# Patient Record
Sex: FEMALE | Race: WHITE | Employment: OTHER | URBAN - METROPOLITAN AREA
[De-identification: names, ages, dates, MRNs, and addresses within clinical notes are randomized per-mention and may not be internally consistent; named-entity substitution may affect disease eponyms.]

---

## 2018-03-12 ENCOUNTER — HOSPITAL ENCOUNTER (OUTPATIENT)
Dept: PHYSICAL THERAPY | Age: 78
Discharge: HOME | End: 2018-03-12
Payer: MEDICARE

## 2018-03-12 PROCEDURE — G8978 MOBILITY CURRENT STATUS: HCPCS

## 2018-03-12 PROCEDURE — G8979 MOBILITY GOAL STATUS: HCPCS

## 2018-03-12 PROCEDURE — 97161 PT EVAL LOW COMPLEX 20 MIN: CPT

## 2018-03-12 PROCEDURE — 97110 THERAPEUTIC EXERCISES: CPT

## 2018-03-12 NOTE — PROGRESS NOTES
Ambulatory/Rehab Services H2 Model Falls Risk Assessment    Risk Factor Pts. ·   Confusion/Disorientation/Impulsivity  []    4 ·   Symptomatic Depression  []   2 ·   Altered Elimination  []   1 ·   Dizziness/Vertigo  []   1 ·   Gender (Male)  []   1 ·   Any administered antiepileptics (anticonvulsants):  []   2 ·   Any administered benzodiazepines:  []   1 ·   Visual Impairment (specify):  []   1 ·   Portable Oxygen Use  []   1 ·   Orthostatic ? BP  []   1 ·   History of Recent Falls (within 3 mos.)  [x]   5     Ability to Rise from Chair (choose one) Pts. ·   Ability to rise in a single movement  [x]   0 ·   Pushes up, successful in one attempt  []   1 ·   Multiple attempts, but successful  []   3 ·   Unable to rise without assistance  []   4   Total: (5 or greater = High Risk) 5     Falls Prevention Plan:   [x]                Physical Limitations to Exercise (specify):constant supervision   []                Mobility Assistance Device (type):   []                Exercise/Equipment Adaptation (specify):    ©2010 [facility] [employer] [address] [place] [address] Patent #[id number].  Federal Law prohibits the replication, distribution or use without written permission from [facility] [employer]

## 2018-03-12 NOTE — THERAPY EVALUATION
Mele Nan  : 1940  [place]  Secondary: [employer] [address] [facility]  at [facility]  [facility], [address], [place] [address]  [phone]   [phone]        OUTPATIENT PHYSICAL THERAPY:Initial Assessment 3/12/2018    ICD-10: Treatment Diagnosis:   Pain in left knee (M25.562)   Presence of left artificial knee joint (F70.677)   Precautions/Allergies:   Sulfa (sulfonamide antibiotics) and Tape [adhesive]   Fall Risk Score: 5 (? 5 = High Risk)  MD Orders: Eval and Treat MEDICAL/REFERRING DIAGNOSIS:  Low back pain [M54.5]  Other specified enthesopathies of left lower limb, excluding foot [M76.892]  Presence of left artificial knee joint [Z96.652]   DATE OF ONSET: 2015  REFERRING PHYSICIAN: Wayne Aguila MD  RETURN PHYSICIAN APPOINTMENT: not scheduled     INITIAL ASSESSMENT:  Ms. Joe Bermudez presents with complaints of pain in left knee mainly with stair climbing and sit to stand. Pt had left TKA in 2015. She progressed quickly with minimal PT. She reports tripping in her yard in January, but admits that current pain was about the same before recent fall. X-ray of her knee indicated no significant changes. Objective measures indicate good ROM with slight strength decrease for left knee. Pt may benefit from PT to address the following problem list.   PROBLEM LIST (Impacting functional limitations):  1. Decreased Strength  2. Decreased ADL/Functional Activities  3. Increased Pain INTERVENTIONS PLANNED:  1. Gait Training  2. Home Exercise Program (HEP)  3. Manual Therapy  4. Therapeutic Exercise/Strengthening   TREATMENT PLAN:  Effective Dates: 3-12-18 TO 3-23-18. Frequency/Duration: 2 times a week for 6 weeks  GOALS: (Goals have been discussed and agreed upon with patient.)  Short-Term Functional Goals: Time Frame: 4 weeks  1. Independent in initial HEP  2.  Decrease pain to < 3/10 with stair ascent and descent  Discharge Goals: Time Frame: 6 weeks  1. Independent in advanced HEP  2. 5/5 strength left quads  3. Minimal pain with stair climbing  Rehabilitation Potential For Stated Goals: Good  Regarding Bernadene Daily therapy, I certify that the treatment plan above will be carried out by a therapist or under their direction. Thank you for this referral,  Lanre Posadas PT                 The information in this section was collected on 3-12-18 (except where otherwise noted). HISTORY:   History of Present Injury/Illness (Reason for Referral):  Pt had left TKA 2015. She completed short round of PT with minimal pain and was discharged to [facility]. Pt reports pain continued mainly with stairs and sit to stand. She reports tripping in her yard in 2018. She was concerned about damage to TKA. X-rays were negative. Pt admits pain is now about the same as before the fall. Past Medical History/Comorbidities:   Ms. Radha Rosales  has a past medical history of Atrial septal aneurysm; CAD (coronary artery disease); Dyslipidemia; History of breast cancer (); Hypertension; Osteoarthritis; Paroxysmal atrial fibrillation ([address] Patent foramen ovale; and Valvular heart disease. Ms. Radha Rosales  has a past surgical history that includes hx mastectomy (Left, ); hx hysterectomy (); hx  section; hx cholecystectomy (); hx mohs procedure (Left, ); hx cataract removal (Bilateral); and hx back surgery. Left TKA 2015.   Social History/Living Environment:     lives alone in single story home with walk in shower  Prior Level of Function/Work/Activity:  independent  Dominant Side:         LEFT  Current Medications:       Current Outpatient Prescriptions:     calcium-cholecalciferol, d3, 600-125 mg-unit tab, Take  by mouth., Disp: , Rfl:     fenofibrate (TRICOR) 160 mg tablet, Take 160 mg by mouth nightly., Disp: , Rfl:     rosuvastatin (CRESTOR) 10 mg tablet, Take 10 mg by mouth nightly., Disp: , Rfl:     benazepril (LOTENSIN) 20 mg tablet, Take 20 mg by mouth daily. Indications: HYPERTENSION, Disp: , Rfl:     amiodarone (CORDARONE) 200 mg tablet, Take 200 mg by mouth daily. Instructed to take DOS per Anesthesia guidelines. Indications: VENTRICULAR RATE CONTROL IN ATRIAL FIBRILLATION, Disp: , Rfl:     aspirin (ASPIRIN) 325 mg tablet, Take 325 mg by mouth daily. Decrease to Aspirin 81mg 5 days prior to surgery per anesthesia guidelines. , Disp: , Rfl:    Date Last Reviewed:  3/12/2018   Number of Personal Factors/Comorbidities that affect the Plan of Care: 0: LOW COMPLEXITY   EXAMINATION:   Observation/Orthostatic Postural Assessment:          Minimal edema left knee - surgical scar well healed with no adhesins noted  Palpation:          Mild tenderness at insertion of left IT band  ROM:          Left knee full extension and 128 flexion  Strength:          Flexion - 5/5  Extension 4+/5 left knee   Body Structures Involved:  1. Joints  2. Muscles Body Functions Affected:  1. Movement Related Activities and Participation Affected:  1. General Tasks and Demands   Number of elements (examined above) that affect the Plan of Care: 1-2: LOW COMPLEXITY   CLINICAL PRESENTATION:   Presentation: Stable and uncomplicated: LOW COMPLEXITY   CLINICAL DECISION MAKING:   Outcome Measure: Tool Used: Lower Extremity Functional Scale (LEFS)  Score:  Initial: 42/80  (Date: 3-12-18 ) Most Recent: X/80 (Date: -- )   Interpretation of Score: 20 questions each scored on a 5 point scale with 0 representing \"extreme difficulty or unable to perform\" and 4 representing \"no difficulty\". The lower the score, the greater the functional disability. 80/80 represents no disability. Minimal detectable change is 9 points. Score 80 79-63 62-48 47-32 31-16 15-1 0   Modifier CH CI CJ CK CL CM CN     ?  Mobility - Walking and Moving Around:     - CURRENT STATUS: CK - 40%-59% impaired, limited or restricted    - GOAL STATUS: CI - 1%-19% impaired, limited or restricted    - D/C STATUS:  ---------------To be determined---------------    Medical Necessity:   · Patient demonstrates good rehab potential due to higher previous functional level. Reason for Services/Other Comments:  · Patient continues to require modification of therapeutic interventions to increase complexity of exercises. Use of outcome tool(s) and clinical judgement create a POC that gives a: Clear prediction of patient's progress: LOW COMPLEXITY            TREATMENT:   (In addition to Assessment/Re-Assessment sessions the following treatments were rendered)  3/12/2018  Pre-treatment Symptoms/Complaints:  Pain mainly with stair ascent and descent and with sit to stand. Pain: Initial:   Pain Intensity 1: 0 (0 current   7 at worst)  Pain Location 1: Knee  Pain Orientation 1: Left  Post Session:  0/10     THERAPEUTIC EXERCISE: (15 minutes):  Exercises per grid below to improve mobility, strength and coordination. Required minimal verbal cues to promote proper body alignment and promote proper body mechanics. Progressed resistance, range, repetitions and complexity of movement as indicated. Access Code: [email or web address]   URL: [email or web address]   Date: 03/12/2018   Prepared by: Donn Low     Exercises  Supine Quad Set - 10 reps - 5 hold - 2x daily  Supine Heel Slide - 10 reps - 2x daily  Small Range Straight Leg Raise - 10 reps - 2x daily  Supine Knee Extension Strengthening - 10 reps - 2x daily     Date:  3-12-18 Date:   Date:     Activity/Exercise Parameters Parameters Parameters   HEP As above                                             Treatment/Session Assessment:    · Response to Treatment:  Pt demonstrates good understanding of initial HEP. · Compliance with Program/Exercises: Will assess as treatment progresses. · Recommendations/Intent for next treatment session: \"Next visit will focus on advancements to more challenging activities\".     Future Appointments  Date Time Provider Sonia Martell   3/15/2018 9:00 AM Lew Briseno, PT [facility]   3/19/2018 9:15 AM Lew Briseno, PT [facility]   3/21/2018 9:15 AM Lew Briseno, PT [facility]   3/26/2018 9:15 AM Lew Briseno, PT [facility]   3/28/2018 9:15 AM Lew Briseno, PT [facility]   4/3/2018 9:15 AM Janet Gracia, DPT [facility]   4/4/2018 9:15 AM Lew Briseno, PT [facility]   4/9/2018 9:15 AM Lew Briseno, PT [facility]   4/11/2018 9:15 AM Lew Briseno, PT [facility]   4/16/2018 9:15 AM Lew Briseno, PT [facility]   4/18/2018 9:15 AM Lew Briseno, PT [facility]     Please explain any variance from Plan of Care.   Total Treatment Duration:  PT Patient Time In/Time Out  Time In: 1500  Time Out: 1600    Lew Briseno, PT

## 2018-03-15 ENCOUNTER — HOSPITAL ENCOUNTER (OUTPATIENT)
Dept: PHYSICAL THERAPY | Age: 78
Discharge: HOME | End: 2018-03-15
Payer: MEDICARE

## 2018-03-15 PROCEDURE — 97110 THERAPEUTIC EXERCISES: CPT

## 2018-03-15 NOTE — PROGRESS NOTES
Courtney Li  : 1940  [place]  Secondary: [employer] Generic [address] [facility]  at [address] , [address], [place]  [phone]   [phone]        OUTPATIENT PHYSICAL THERAPY:Daily Note 3/15/2018    ICD-10: Treatment Diagnosis:   Pain in left knee (M25.562)   Presence of left artificial knee joint (U90.626)   Precautions/Allergies:   Sulfa (sulfonamide antibiotics) and Tape [adhesive]   Fall Risk Score: 5 (? 5 = High Risk)  MD Orders: Eval and Treat MEDICAL/REFERRING DIAGNOSIS:  Low back pain [M54.5]  Other specified enthesopathies of left lower limb, excluding foot [M76.892]  Presence of left artificial knee joint [Z96.652]   DATE OF ONSET: 2015  REFERRING PHYSICIAN: Macie Rayo MD  RETURN PHYSICIAN APPOINTMENT: not scheduled     INITIAL ASSESSMENT:  Ms. Alexander Ra presents with complaints of pain in left knee mainly with stair climbing and sit to stand. Pt had left TKA in 2015. She progressed quickly with minimal PT. She reports tripping in her yard in January, but admits that current pain was about the same before recent fall. X-ray of her knee indicated no significant changes. Objective measures indicate good ROM with slight strength decrease for left knee. Pt may benefit from PT to address the following problem list.   PROBLEM LIST (Impacting functional limitations):  1. Decreased Strength  2. Decreased ADL/Functional Activities  3. Increased Pain INTERVENTIONS PLANNED:  1. Gait Training  2. Home Exercise Program (HEP)  3. Manual Therapy  4. Therapeutic Exercise/Strengthening   TREATMENT PLAN:  Effective Dates: 3-12-18 TO 3-23-18. Frequency/Duration: 2 times a week for 6 weeks  GOALS: (Goals have been discussed and agreed upon with patient.)  Short-Term Functional Goals: Time Frame: 4 weeks  1. Independent in initial HEP  2.  Decrease pain to < 3/10 with stair ascent and descent  Discharge Goals: Time Frame: 6 weeks  1. Independent in advanced HEP  2. 5/5 strength left quads  3. Minimal pain with stair climbing  Rehabilitation Potential For Stated Goals: Good  Regarding Lexi Cotto therapy, I certify that the treatment plan above will be carried out by a therapist or under their direction. Thank you for this referral,  Clement Corado PT                 The information in this section was collected on 3-12-18 (except where otherwise noted). HISTORY:   History of Present Injury/Illness (Reason for Referral):  Pt had left TKA 2015. She completed short round of PT with minimal pain and was discharged to [facility]. Pt reports pain continued mainly with stairs and sit to stand. She reports tripping in her yard in 2018. She was concerned about damage to TKA. X-rays were negative. Pt admits pain is now about the same as before the fall. Past Medical History/Comorbidities:   Ms. Jake Lozano  has a past medical history of Atrial septal aneurysm; CAD (coronary artery disease); Dyslipidemia; History of breast cancer (); Hypertension; Osteoarthritis; Paroxysmal atrial fibrillation ([facility] [address] Patent foramen ovale; and Valvular heart disease. Ms. Jake Lozano  has a past surgical history that includes hx mastectomy (Left, ); hx hysterectomy (); hx  section; hx cholecystectomy (); hx mohs procedure (Left, ); hx cataract removal (Bilateral); and hx back surgery. Left TKA 2015.   Social History/Living Environment:     lives alone in single story home with walk in shower  Prior Level of Function/Work/Activity:  independent  Dominant Side:         LEFT  Current Medications:       Current Outpatient Prescriptions:     calcium-cholecalciferol, d3, 600-125 mg-unit tab, Take  by mouth., Disp: , Rfl:     fenofibrate (TRICOR) 160 mg tablet, Take 160 mg by mouth nightly., Disp: , Rfl:     rosuvastatin (CRESTOR) 10 mg tablet, Take 10 mg by mouth nightly., Disp: , Rfl:     benazepril (LOTENSIN) 20 mg tablet, Take 20 mg by mouth daily. Indications: HYPERTENSION, Disp: , Rfl:     amiodarone (CORDARONE) 200 mg tablet, Take 200 mg by mouth daily. Instructed to take DOS per Anesthesia guidelines. Indications: VENTRICULAR RATE CONTROL IN ATRIAL FIBRILLATION, Disp: , Rfl:     aspirin (ASPIRIN) 325 mg tablet, Take 325 mg by mouth daily. Decrease to Aspirin 81mg 5 days prior to surgery per anesthesia guidelines. , Disp: , Rfl:    Date Last Reviewed:  3/15/2018   EXAMINATION:   Observation/Orthostatic Postural Assessment:          Minimal edema left knee - surgical scar well healed with no adhesins noted  Palpation:          Mild tenderness at insertion of left IT band  ROM:          Left knee full extension and 128 flexion  Strength:          Flexion - 5/5  Extension 4+/5 left knee   CLINICAL PRESENTATION:   CLINICAL DECISION MAKING:   Outcome Measure: Tool Used: Lower Extremity Functional Scale (LEFS)  Score:  Initial: 42/80  (Date: 3-12-18 ) Most Recent: X/80 (Date: -- )   Interpretation of Score: 20 questions each scored on a 5 point scale with 0 representing \"extreme difficulty or unable to perform\" and 4 representing \"no difficulty\". The lower the score, the greater the functional disability. 80/80 represents no disability. Minimal detectable change is 9 points. Score 80 79-63 62-48 47-32 31-16 15-1 0   Modifier CH CI CJ CK CL CM CN     ? Mobility - Walking and Moving Around:     - CURRENT STATUS: CK - 40%-59% impaired, limited or restricted    - GOAL STATUS: CI - 1%-19% impaired, limited or restricted    - D/C STATUS:  ---------------To be determined---------------    Medical Necessity:   · Patient demonstrates good rehab potential due to higher previous functional level. Reason for Services/Other Comments:  · Patient continues to require modification of therapeutic interventions to increase complexity of exercises.             TREATMENT:   (In addition to Assessment/Re-Assessment sessions the following treatments were rendered)  3/15/2018  Pre-treatment Symptoms/Complaints:  Pt states that her good knee is a little sore with start of HEP. We had recommended doing HEP bilaterally. Pain: Initial:   Pain Intensity 1: 2  Pain Location 1: Knee  Post Session:  2/10     THERAPEUTIC EXERCISE: (45 minutes):  Exercises per grid below to improve mobility, strength and coordination. Required minimal verbal cues to promote proper body alignment and promote proper body mechanics. Progressed resistance, range, repetitions and complexity of movement as indicated. Access Code: [email or web address]   URL: [email or web address]   Date: 03/12/2018   Prepared by: Donn Low     Exercises  Supine Quad Set - 10 reps - 5 hold - 2x daily  Supine Heel Slide - 10 reps - 2x daily  Small Range Straight Leg Raise - 10 reps - 2x daily  Supine Knee Extension Strengthening - 10 reps - 2x daily     Date:  3-12-18 Date:  3-15-18 Date:     Activity/Exercise Parameters Parameters Parameters   HEP As above     Recumbent stepper  Level 1 x 10'    Ant step ups  X 10 BLE    Lat step ups  X 10 BLE    Ant heel taps  X 10 BLE    Incline calf  10 x 5\"    Wall squats  X 10    QS  X 20    SLR  2# x 10    SAQ  2# x 10    Standing hamstring curl  2# x 10    Standing hip abd  2# x 10    Standing hip flx  2# x 10        Treatment/Session Assessment:    · Response to Treatment:  Pt moderately fatigued at end of session. · Compliance with Program/Exercises: Will assess as treatment progresses. · Recommendations/Intent for next treatment session: \"Next visit will focus on advancements to more challenging activities\".     Future Appointments  Date Time Provider Sonia Martell   3/19/2018 9:15 AM Moni Rodriguez PT [facility]   3/21/2018 9:15 AM Moni Rodriguez PT [facility]   3/26/2018 9:15 AM Moni Rodriguez PT [facility]   3/28/2018 9:15 AM Moni Rodriguez PT [facility]   4/3/2018 1:00 PM Lalita Pemberton DPT [facility]   4/4/2018 9:15 AM Alexandra Plaza, PT [facility]   4/9/2018 9:15 AM Alexandra Plaza, PT [facility]   4/11/2018 9:15 AM Alexandra Plaza, PT [facility]   4/16/2018 9:15 AM Alexandra Plaza, PT [facility]   4/18/2018 9:15 AM Alexandra Plaza, PT [facility]     Please explain any variance from Plan of Care.   Total Treatment Duration:  PT Patient Time In/Time Out  Time In: 0900  Time Out: 0945    Alexandra Plaza PT

## 2018-03-19 ENCOUNTER — HOSPITAL ENCOUNTER (OUTPATIENT)
Dept: PHYSICAL THERAPY | Age: 78
Discharge: HOME | End: 2018-03-19
Payer: MEDICARE

## 2018-03-19 PROCEDURE — 97110 THERAPEUTIC EXERCISES: CPT

## 2018-03-19 NOTE — PROGRESS NOTES
Carlotta Correia  : 1940  [place]  Secondary: [employer] Generic [address] [facility]  at [facility]  [address] , [address], [place] [address]  [phone]   [phone]        OUTPATIENT PHYSICAL THERAPY:Daily Note 3/19/2018    ICD-10: Treatment Diagnosis:   Pain in left knee (M25.562)   Presence of left artificial knee joint (Z38.578)   Precautions/Allergies:   Sulfa (sulfonamide antibiotics) and Tape [adhesive]   Fall Risk Score: 5 (? 5 = High Risk)  MD Orders: Eval and Treat MEDICAL/REFERRING DIAGNOSIS:  Low back pain [M54.5]  Other specified enthesopathies of left lower limb, excluding foot [M76.892]  Presence of left artificial knee joint [Z96.652]   DATE OF ONSET: 2015  REFERRING PHYSICIAN: Franklin Conteh MD  RETURN PHYSICIAN APPOINTMENT: not scheduled     INITIAL ASSESSMENT:  Ms. Arvin Luong presents with complaints of pain in left knee mainly with stair climbing and sit to stand. Pt had left TKA in 2015. She progressed quickly with minimal PT. She reports tripping in her yard in January, but admits that current pain was about the same before recent fall. X-ray of her knee indicated no significant changes. Objective measures indicate good ROM with slight strength decrease for left knee. Pt may benefit from PT to address the following problem list.   PROBLEM LIST (Impacting functional limitations):  1. Decreased Strength  2. Decreased ADL/Functional Activities  3. Increased Pain INTERVENTIONS PLANNED:  1. Gait Training  2. Home Exercise Program (HEP)  3. Manual Therapy  4. Therapeutic Exercise/Strengthening   TREATMENT PLAN:  Effective Dates: 3-12-18 TO 18. Frequency/Duration: 2 times a week for 6 weeks  GOALS: (Goals have been discussed and agreed upon with patient.)  Short-Term Functional Goals: Time Frame: 4 weeks  1. Independent in initial HEP  2.  Decrease pain to < 3/10 with stair ascent and descent  Discharge Goals: Time Frame: 6 weeks  1. Independent in advanced HEP  2. 5/5 strength left quads  3. Minimal pain with stair climbing  Rehabilitation Potential For Stated Goals: Good  Regarding [facility] therapy, I certify that the treatment plan above will be carried out by a therapist or under their direction. Thank you for this referral,  Tj Wright, PT                 The information in this section was collected on 3-12-18 (except where otherwise noted). HISTORY:   History of Present Injury/Illness (Reason for Referral):  Pt had left TKA 2015. She completed short round of PT with minimal pain and was discharged to [facility]. Pt reports pain continued mainly with stairs and sit to stand. She reports tripping in her yard in 2018. She was concerned about damage to TKA. X-rays were negative. Pt admits pain is now about the same as before the fall. Past Medical History/Comorbidities:   Ms. [facility]  has a past medical history of Atrial septal aneurysm; CAD (coronary artery disease); Dyslipidemia; History of breast cancer (); Hypertension; Osteoarthritis; Paroxysmal atrial fibrillation ([address] Patent foramen ovale; and Valvular heart disease. Ms. [facility]  has a past surgical history that includes hx mastectomy (Left, ); hx hysterectomy (); hx  section; hx cholecystectomy (); hx mohs procedure (Left, ); hx cataract removal (Bilateral); and hx back surgery. Left TKA 2015.   Social History/Living Environment:     lives alone in single story home with walk in shower  Prior Level of Function/Work/Activity:  independent  Dominant Side:         LEFT  Current Medications:       Current Outpatient Prescriptions:     calcium-cholecalciferol, d3, 600-125 mg-unit tab, Take  by mouth., Disp: , Rfl:     fenofibrate (TRICOR) 160 mg tablet, Take 160 mg by mouth nightly., Disp: , Rfl:     rosuvastatin (CRESTOR) 10 mg tablet, Take 10 mg by mouth nightly., Disp: , Rfl:     benazepril (LOTENSIN) 20 mg tablet, Take 20 mg by mouth daily. Indications: HYPERTENSION, Disp: , Rfl:     amiodarone (CORDARONE) 200 mg tablet, Take 200 mg by mouth daily. Instructed to take DOS per Anesthesia guidelines. Indications: VENTRICULAR RATE CONTROL IN ATRIAL FIBRILLATION, Disp: , Rfl:     aspirin (ASPIRIN) 325 mg tablet, Take 325 mg by mouth daily. Decrease to Aspirin 81mg 5 days prior to surgery per anesthesia guidelines. , Disp: , Rfl:    Date Last Reviewed:  3/19/2018   EXAMINATION:   Observation/Orthostatic Postural Assessment:          Minimal edema left knee - surgical scar well healed with no adhesins noted  Palpation:          Mild tenderness at insertion of left IT band  ROM:          Left knee full extension and 128 flexion  Strength:          Flexion - 5/5  Extension 4+/5 left knee   CLINICAL PRESENTATION:   CLINICAL DECISION MAKING:   Outcome Measure: Tool Used: Lower Extremity Functional Scale (LEFS)  Score:  Initial: 42/80  (Date: 3-12-18 ) Most Recent: X/80 (Date: -- )   Interpretation of Score: 20 questions each scored on a 5 point scale with 0 representing \"extreme difficulty or unable to perform\" and 4 representing \"no difficulty\". The lower the score, the greater the functional disability. 80/80 represents no disability. Minimal detectable change is 9 points. Score 80 79-63 62-48 47-32 31-16 15-1 0   Modifier CH CI CJ CK CL CM CN     ? Mobility - Walking and Moving Around:     - CURRENT STATUS: CK - 40%-59% impaired, limited or restricted    - GOAL STATUS: CI - 1%-19% impaired, limited or restricted    - D/C STATUS:  ---------------To be determined---------------    Medical Necessity:   · Patient demonstrates good rehab potential due to higher previous functional level. Reason for Services/Other Comments:  · Patient continues to require modification of therapeutic interventions to increase complexity of exercises.             TREATMENT:   (In addition to Assessment/Re-Assessment sessions the following treatments were rendered)  3/19/2018  Pre-treatment Symptoms/Complaints:  Pt reports mild soreness both knees over the week-end. Pain: Initial:   Pain Intensity 1: 2  Pain Location 1: Knee  Post Session: 0/10     THERAPEUTIC EXERCISE: (45 minutes):  Exercises per grid below to improve mobility, strength and coordination. Required minimal verbal cues to promote proper body alignment and promote proper body mechanics. Progressed resistance, range, repetitions and complexity of movement as indicated. Access Code: [email or web address]   URL: [email or web address]   Date: 03/12/2018   Prepared by: Donn Low     Exercises  Supine Quad Set - 10 reps - 5 hold - 2x daily  Supine Heel Slide - 10 reps - 2x daily  Small Range Straight Leg Raise - 10 reps - 2x daily  Supine Knee Extension Strengthening - 10 reps - 2x daily     Date:  3-12-18 Date:  3-15-18 Date:  3-19-18   Activity/Exercise Parameters Parameters Parameters   HEP As above     Recumbent stepper  Level 1 x 10' Level 1 x 10'   Ant step ups  X 10 BLE X 10 BLE   Lat step ups  X 10 BLE X 10 BLE   Ant heel taps  X 10 BLE X 10 BLE   Incline calf  10 x 5\" 10 x 5\"   Wall squats  X 10 X 10   QS  X 20 X 20   SLR  2# x 10 2# x 10   SAQ  2# x 10 2# x 10   Standing hamstring curl  2# x 10 2# x 10   Standing hip abd  2# x 10 2# x 10   Standing hip flx  2# x 10 2# x 10       Treatment/Session Assessment:    · Response to Treatment:  Pt reports both knees feel better at end of session. Demonstrated improved form to day with steps, incline and SLR. · Compliance with Program/Exercises: Will assess as treatment progresses. · Recommendations/Intent for next treatment session: \"Next visit will focus on advancements to more challenging activities\".     Future Appointments  Date Time Provider Sonia Martell   [facility]2018 [facility]   3/26/2018 9:15 AM Marlene Moreira PT [facility]   3/28/2018 9:15 AM Richard Jaramillo, PT [facility]   4/3/2018 1:00 PM Marquis Baumann DPT [facility]   4/4/2018 9:15 AM Richard Jaramillo, PT [facility]   4/9/2018 9:15 AM Richard Jaramillo, PT [facility]   4/11/2018 9:15 AM Richard Jaramillo, PT [facility]   4/16/2018 9:15 AM Richard Jaramillo, PT [facility]   4/18/2018 9:15 AM Richard Jaramillo, PT [facility]     Please explain any variance from Plan of Care.   Total Treatment Duration:  PT Patient Time In/Time Out  Time In: 0915  Time Out: [address] [facility]

## 2018-03-21 ENCOUNTER — HOSPITAL ENCOUNTER (OUTPATIENT)
Dept: PHYSICAL THERAPY | Age: 78
Discharge: HOME | End: 2018-03-21
Payer: MEDICARE

## 2018-03-21 PROCEDURE — 97110 THERAPEUTIC EXERCISES: CPT

## 2018-03-21 NOTE — PROGRESS NOTES
Coleman Mckay  : 1940  [place]  Secondary: [employer] Generic [address] [facility]  at [address]  [facility] [place], [address], [place]  [phone]   [phone]        OUTPATIENT PHYSICAL THERAPY:Daily Note 3/21/2018    ICD-10: Treatment Diagnosis:   Pain in left knee (M25.562)   Presence of left artificial knee joint (J83.372)   Precautions/Allergies:   Sulfa (sulfonamide antibiotics) and Tape [adhesive]   Fall Risk Score: 5 (? 5 = High Risk)  MD Orders: Eval and Treat MEDICAL/REFERRING DIAGNOSIS:  Low back pain [M54.5]  Other specified enthesopathies of left lower limb, excluding foot [M76.892]  Presence of left artificial knee joint [Z96.652]   DATE OF ONSET: 2015  REFERRING PHYSICIAN: Alicia Garzon MD  RETURN PHYSICIAN APPOINTMENT: not scheduled     INITIAL ASSESSMENT:  Ms. Main White presents with complaints of pain in left knee mainly with stair climbing and sit to stand. Pt had left TKA in 2015. She progressed quickly with minimal PT. She reports tripping in her yard in January, but admits that current pain was about the same before recent fall. X-ray of her knee indicated no significant changes. Objective measures indicate good ROM with slight strength decrease for left knee. Pt may benefit from PT to address the following problem list.   PROBLEM LIST (Impacting functional limitations):  1. Decreased Strength  2. Decreased ADL/Functional Activities  3. Increased Pain INTERVENTIONS PLANNED:  1. Gait Training  2. Home Exercise Program (HEP)  3. Manual Therapy  4. Therapeutic Exercise/Strengthening   TREATMENT PLAN:  Effective Dates: 3-12-18 TO 18. Frequency/Duration: 2 times a week for 6 weeks  GOALS: (Goals have been discussed and agreed upon with patient.)  Short-Term Functional Goals: Time Frame: 4 weeks  1. Independent in initial HEP  2.  Decrease pain to < 3/10 with stair ascent and descent  Discharge Goals: Time Frame: 6 weeks  1. Independent in advanced HEP  2. 5/5 strength left quads  3. Minimal pain with stair climbing  Rehabilitation Potential For Stated Goals: Good  Regarding Charmayne Brodie therapy, I certify that the treatment plan above will be carried out by a therapist or under their direction. Thank you for this referral,  Luis Enrique Day, PT                 The information in this section was collected on 3-12-18 (except where otherwise noted). HISTORY:   History of Present Injury/Illness (Reason for Referral):  Pt had left TKA 2015. She completed short round of PT with minimal pain and was discharged to [facility]. Pt reports pain continued mainly with stairs and sit to stand. She reports tripping in her yard in 2018. She was concerned about damage to TKA. X-rays were negative. Pt admits pain is now about the same as before the fall. Past Medical History/Comorbidities:   Ms. [facility]  has a past medical history of Atrial septal aneurysm; CAD (coronary artery disease); Dyslipidemia; History of breast cancer (); Hypertension; Osteoarthritis; Paroxysmal atrial fibrillation ([facility] [address] Patent foramen ovale; and Valvular heart disease. Ms. [facility]  has a past surgical history that includes hx mastectomy (Left, ); hx hysterectomy (); hx  section; hx cholecystectomy (); hx mohs procedure (Left, ); hx cataract removal (Bilateral); and hx back surgery. Left TKA 2015.   Social History/Living Environment:     lives alone in single story home with walk in shower  Prior Level of Function/Work/Activity:  independent  Dominant Side:         LEFT  Current Medications:       Current Outpatient Prescriptions:     calcium-cholecalciferol, d3, 600-125 mg-unit tab, Take  by mouth., Disp: , Rfl:     fenofibrate (TRICOR) 160 mg tablet, Take 160 mg by mouth nightly., Disp: , Rfl:     rosuvastatin (CRESTOR) 10 mg tablet, Take 10 mg by mouth nightly., Disp: , Rfl:     benazepril (LOTENSIN) 20 mg tablet, Take 20 mg by mouth daily. Indications: HYPERTENSION, Disp: , Rfl:     amiodarone (CORDARONE) 200 mg tablet, Take 200 mg by mouth daily. Instructed to take DOS per Anesthesia guidelines. Indications: VENTRICULAR RATE CONTROL IN ATRIAL FIBRILLATION, Disp: , Rfl:     aspirin (ASPIRIN) 325 mg tablet, Take 325 mg by mouth daily. Decrease to Aspirin 81mg 5 days prior to surgery per anesthesia guidelines. , Disp: , Rfl:    Date Last Reviewed:  3/21/2018   EXAMINATION:   Observation/Orthostatic Postural Assessment:          Minimal edema left knee - surgical scar well healed with no adhesins noted  Palpation:          Mild tenderness at insertion of left IT band  ROM:          Left knee full extension and 128 flexion  Strength:          Flexion - 5/5  Extension 4+/5 left knee   CLINICAL PRESENTATION:   CLINICAL DECISION MAKING:   Outcome Measure: Tool Used: Lower Extremity Functional Scale (LEFS)  Score:  Initial: 42/80  (Date: 3-12-18 ) Most Recent: X/80 (Date: -- )   Interpretation of Score: 20 questions each scored on a 5 point scale with 0 representing \"extreme difficulty or unable to perform\" and 4 representing \"no difficulty\". The lower the score, the greater the functional disability. 80/80 represents no disability. Minimal detectable change is 9 points. Score 80 79-63 62-48 47-32 31-16 15-1 0   Modifier CH CI CJ CK CL CM CN     ? Mobility - Walking and Moving Around:     - CURRENT STATUS: CK - 40%-59% impaired, limited or restricted    - GOAL STATUS: CI - 1%-19% impaired, limited or restricted    - D/C STATUS:  ---------------To be determined---------------    Medical Necessity:   · Patient demonstrates good rehab potential due to higher previous functional level. Reason for Services/Other Comments:  · Patient continues to require modification of therapeutic interventions to increase complexity of exercises.             TREATMENT:   (In addition to Assessment/Re-Assessment sessions the following treatments were rendered)  3/21/2018  Pre-treatment Symptoms/Complaints:  Pt reports significant pain both knees yesterday, but much better today. Pain: Initial:   Pain Intensity 1: 2  Pain Location 1: Knee  Post Session: 1/10     THERAPEUTIC EXERCISE: (45 minutes):  Exercises per grid below to improve mobility, strength and coordination. Required minimal verbal cues to promote proper body alignment and promote proper body mechanics. Progressed resistance, range, repetitions and complexity of movement as indicated. Access Code: [email or web address]   URL: [email or web address]   Date: 03/12/2018   Prepared by: Donn Low     Exercises  Supine Quad Set - 10 reps - 5 hold - 2x daily  Supine Heel Slide - 10 reps - 2x daily  Small Range Straight Leg Raise - 10 reps - 2x daily  Supine Knee Extension Strengthening - 10 reps - 2x daily     Date:  3-12-18 Date:  3-15-18 Date:  3-19-18 Date:  3-21-18   Activity/Exercise Parameters Parameters Parameters    HEP As above      Recumbent stepper  Level 1 x 10' Level 1 x 10' Level 1 x 10'   Ant step ups  X 10 BLE X 10 BLE X 10 BLE   Lat step ups  X 10 BLE X 10 BLE X 10 BLE   Ant heel taps  X 10 BLE X 10 BLE X 10 BLE   Incline calf  10 x 5\" 10 x 5\" 10 x 5\"   Wall squats  X 10 X 10 X 10   QS  X 20 X 20 X 20   SLR  2# x 10 2# x 10 2# x 10   SAQ  2# x 10 2# x 10 2# x 10   Standing hamstring curl  2# x 10 2# x 10 2# x 10   Standing hip abd  2# x 10 2# x 10 2# x 10   Standing hip flx  2# x 10 2# x 10 2# x 10   Backward walk    45' x 2 HHA       Treatment/Session Assessment:    · Response to Treatment:  Pt demonstrated moderate balance difficulty with backward walk requiring HHA x 1.  · Compliance with Program/Exercises: Will assess as treatment progresses. · Recommendations/Intent for next treatment session: \"Next visit will focus on advancements to more challenging activities\".     Future Appointments  Date Time Provider Sonia Martell   3/26/2018 9:15 AM Evelio Mosqueda, PT [facility]   3/28/2018 9:15 AM Evelio Mosqueda, PT [facility]   4/3/2018 1:00 PM Alia Arroyo, DPT [facility]   4/4/2018 9:15 AM Evelio Mosqueda, PT [facility]   4/9/2018 9:15 AM Evelio Mosqueda, PT [facility]   4/11/2018 9:15 AM Evelio Mosqueda, PT [facility]   4/16/2018 9:15 AM Evelio Mosqueda, PT [facility]   4/18/2018 9:15 AM Evelio Mosqueda, PT [facility]     Please explain any variance from Plan of Care.   Total Treatment Duration:  PT Patient Time In/Time Out  Time In: 0915  Time Out: [address] [facility] [place]

## 2018-03-26 ENCOUNTER — HOSPITAL ENCOUNTER (OUTPATIENT)
Dept: PHYSICAL THERAPY | Age: 78
Discharge: HOME | End: 2018-03-26
Payer: MEDICARE

## 2018-03-26 PROCEDURE — 97110 THERAPEUTIC EXERCISES: CPT

## 2018-03-26 NOTE — PROGRESS NOTES
Analilia Cutler  : 1940  [place]  Secondary: [employer] [address] [facility]  at [address]  [facility] [address], [place] [address]  [phone]   [phone]        OUTPATIENT PHYSICAL THERAPY:Daily Note 3/26/2018    ICD-10: Treatment Diagnosis:   Pain in left knee (M25.562)   Presence of left artificial knee joint (G51.643)   Precautions/Allergies:   Sulfa (sulfonamide antibiotics) and Tape [adhesive]   Fall Risk Score: 5 (? 5 = High Risk)  MD Orders: Eval and Treat MEDICAL/REFERRING DIAGNOSIS:  Low back pain [M54.5]  Other specified enthesopathies of left lower limb, excluding foot [M76.892]  Presence of left artificial knee joint [Z96.652]   DATE OF ONSET: 2015  REFERRING PHYSICIAN: Edelmira Campos MD  RETURN PHYSICIAN APPOINTMENT: not scheduled     INITIAL ASSESSMENT:  Ms. Jassi Joseph presents with complaints of pain in left knee mainly with stair climbing and sit to stand. Pt had left TKA in 2015. She progressed quickly with minimal PT. She reports tripping in her yard in January, but admits that current pain was about the same before recent fall. X-ray of her knee indicated no significant changes. Objective measures indicate good ROM with slight strength decrease for left knee. Pt may benefit from PT to address the following problem list.   PROBLEM LIST (Impacting functional limitations):  1. Decreased Strength  2. Decreased ADL/Functional Activities  3. Increased Pain INTERVENTIONS PLANNED:  1. Gait Training  2. Home Exercise Program (HEP)  3. Manual Therapy  4. Therapeutic Exercise/Strengthening   TREATMENT PLAN:  Effective Dates: 3-12-18 TO 18. Frequency/Duration: 2 times a week for 6 weeks  GOALS: (Goals have been discussed and agreed upon with patient.)  Short-Term Functional Goals: Time Frame: 4 weeks  1. Independent in initial HEP  2.  Decrease pain to < 3/10 with stair ascent and descent  Discharge Goals: Time Frame: 6 weeks  1. Independent in advanced HEP  2. 5/5 strength left quads  3. Minimal pain with stair climbing  Rehabilitation Potential For Stated Goals: Good  Regarding Lenon Opitz therapy, I certify that the treatment plan above will be carried out by a therapist or under their direction. Thank you for this referral,  Charbel Walters PT                 The information in this section was collected on 3-12-18 (except where otherwise noted). HISTORY:   History of Present Injury/Illness (Reason for Referral):  Pt had left TKA 2015. She completed short round of PT with minimal pain and was discharged to [facility]. Pt reports pain continued mainly with stairs and sit to stand. She reports tripping in her yard in 2018. She was concerned about damage to TKA. X-rays were negative. Pt admits pain is now about the same as before the fall. Past Medical History/Comorbidities:   Ms. Edwin Evangelista  has a past medical history of Atrial septal aneurysm; CAD (coronary artery disease); Dyslipidemia; History of breast cancer (); Hypertension; Osteoarthritis; Paroxysmal atrial fibrillation ([facility] [address] Patent foramen ovale; and Valvular heart disease. Ms. Edwin Evangelista  has a past surgical history that includes hx mastectomy (Left, ); hx hysterectomy (); hx  section; hx cholecystectomy (); hx mohs procedure (Left, ); hx cataract removal (Bilateral); and hx back surgery. Left TKA 2015.   Social History/Living Environment:     lives alone in single story home with walk in shower  Prior Level of Function/Work/Activity:  independent  Dominant Side:         LEFT  Current Medications:       Current Outpatient Prescriptions:     calcium-cholecalciferol, d3, 600-125 mg-unit tab, Take  by mouth., Disp: , Rfl:     fenofibrate (TRICOR) 160 mg tablet, Take 160 mg by mouth nightly., Disp: , Rfl:     rosuvastatin (CRESTOR) 10 mg tablet, Take 10 mg by mouth nightly., Disp: , Rfl:     benazepril (LOTENSIN) 20 mg tablet, Take 20 mg by mouth daily. Indications: HYPERTENSION, Disp: , Rfl:     amiodarone (CORDARONE) 200 mg tablet, Take 200 mg by mouth daily. Instructed to take DOS per Anesthesia guidelines. Indications: VENTRICULAR RATE CONTROL IN ATRIAL FIBRILLATION, Disp: , Rfl:     aspirin (ASPIRIN) 325 mg tablet, Take 325 mg by mouth daily. Decrease to Aspirin 81mg 5 days prior to surgery per anesthesia guidelines. , Disp: , Rfl:    Date Last Reviewed:  3/26/2018   EXAMINATION:   Observation/Orthostatic Postural Assessment:          Minimal edema left knee - surgical scar well healed with no adhesins noted  Palpation:          Mild tenderness at insertion of left IT band  ROM:          Left knee full extension and 128 flexion  Strength:          Flexion - 5/5  Extension 4+/5 left knee   CLINICAL PRESENTATION:   CLINICAL DECISION MAKING:   Outcome Measure: Tool Used: Lower Extremity Functional Scale (LEFS)  Score:  Initial: 42/80  (Date: 3-12-18 ) Most Recent: X/80 (Date: -- )   Interpretation of Score: 20 questions each scored on a 5 point scale with 0 representing \"extreme difficulty or unable to perform\" and 4 representing \"no difficulty\". The lower the score, the greater the functional disability. 80/80 represents no disability. Minimal detectable change is 9 points. Score 80 79-63 62-48 47-32 31-16 15-1 0   Modifier CH CI CJ CK CL CM CN     ? Mobility - Walking and Moving Around:     - CURRENT STATUS: CK - 40%-59% impaired, limited or restricted    - GOAL STATUS: CI - 1%-19% impaired, limited or restricted    - D/C STATUS:  ---------------To be determined---------------    Medical Necessity:   · Patient demonstrates good rehab potential due to higher previous functional level. Reason for Services/Other Comments:  · Patient continues to require modification of therapeutic interventions to increase complexity of exercises.             TREATMENT:   (In addition to Assessment/Re-Assessment sessions the following treatments were rendered)  3/26/2018  Pre-treatment Symptoms/Complaints:  Pt reports both knees felt much better over the week-end. States she noted decreased pain with stair ascending. Pain: Initial:   Pain Intensity 1: 1  Pain Location 1: Knee  Pain Orientation 1: Left  Post Session: 1/10     THERAPEUTIC EXERCISE: (45 minutes):  Exercises per grid below to improve mobility, strength and coordination. Required minimal verbal cues to promote proper body alignment and promote proper body mechanics. Progressed resistance, range, repetitions and complexity of movement as indicated. Access Code: [email or web address]   URL: [email or web address]   Date: 03/12/2018   Prepared by: Donn Low     Exercises  Supine Quad Set - 10 reps - 5 hold - 2x daily  Supine Heel Slide - 10 reps - 2x daily  Small Range Straight Leg Raise - 10 reps - 2x daily  Supine Knee Extension Strengthening - 10 reps - 2x daily     Date:  3-12-18 Date:  3-15-18 Date:  3-19-18 Date:  3-21-18 Date:  3-26-18   Activity/Exercise Parameters Parameters Parameters Parameters Parameters   HEP As above       Recumbent stepper  Level 1 x 10' Level 1 x 10' Level 1 x 10' Level 1 x 10'   Ant step ups  X 10 BLE X 10 BLE X 10 BLE X 10 BLE   Lat step ups  X 10 BLE X 10 BLE X 10 BLE X 10 BLE   Ant heel taps  X 10 BLE X 10 BLE X 10 BLE X 10 BLE   Incline calf  10 x 5\" 10 x 5\" 10 x 5\" 10 x 5\"   Wall squats  X 10 X 10 X 10 X 10   QS  X 20 X 20 X 20 X 20   SLR  2# x 10 2# x 10 2# x 10 3# x 10   SAQ  2# x 10 2# x 10 2# x 10 3# x 10   Standing hamstring curl  2# x 10 2# x 10 2# x 10 3# x 10   Standing hip abd  2# x 10 2# x 10 2# x 10 3# x 10   Standing hip flx  2# x 10 2# x 10 2# x 10 3# x 10   Backward walk    45' x 2 HHA 45' x 2 HHA   Side steps     45' x 2 HHA       Treatment/Session Assessment:    · Response to Treatment:  Pt mildly fatigued at end of session. · Compliance with Program/Exercises:  Will assess as treatment progresses. · Recommendations/Intent for next treatment session: \"Next visit will focus on advancements to more challenging activities\". Future Appointments  Date Time Provider Sonia Martell   3/28/2018 9:15 AM [facility]   4/3/2018 1:00 PM RUBY SchaefferT [facility]   4/4/2018 9:15 AM [facility]   4/9/2018 9:15 AM [facility]enten Bever[facility]   4/11/2018 9:15 AM [facility] Bever[facility]   4/16/2018 9:15 AM [facility]enten Bever[facility]   4/18/2018 9:15 AM Quenten Beverage[facility]     Please explain any variance from Plan of Care.   Total Treatment Duration:  PT Patient Time In/Time Out  Time In: 0915  Time Out: [address] GALE Low

## 2018-03-28 ENCOUNTER — HOSPITAL ENCOUNTER (OUTPATIENT)
Dept: PHYSICAL THERAPY | Age: 78
Discharge: HOME | End: 2018-03-28
Payer: MEDICARE

## 2018-03-28 PROCEDURE — 97110 THERAPEUTIC EXERCISES: CPT

## 2018-03-28 NOTE — PROGRESS NOTES
Daisha Severe  : 1940  [place]  Secondary: [employer] Generic [address] [facility]  at [address]  [facility], [address], [place] [address]  [phone]   [phone]        OUTPATIENT PHYSICAL THERAPY:Daily Note 3/28/2018    ICD-10: Treatment Diagnosis:   Pain in left knee (M25.562)   Presence of left artificial knee joint (K19.623)   Precautions/Allergies:   Sulfa (sulfonamide antibiotics) and Tape [adhesive]   Fall Risk Score: 5 (? 5 = High Risk)  MD Orders: Eval and Treat MEDICAL/REFERRING DIAGNOSIS:  Low back pain [M54.5]  Other specified enthesopathies of left lower limb, excluding foot [M76.892]  Presence of left artificial knee joint [Z96.652]   DATE OF ONSET: 2015  REFERRING PHYSICIAN: Christian Hatfield MD  RETURN PHYSICIAN APPOINTMENT: not scheduled     INITIAL ASSESSMENT:  Ms. Margarita Marion presents with complaints of pain in left knee mainly with stair climbing and sit to stand. Pt had left TKA in 2015. She progressed quickly with minimal PT. She reports tripping in her yard in January, but admits that current pain was about the same before recent fall. X-ray of her knee indicated no significant changes. Objective measures indicate good ROM with slight strength decrease for left knee. Pt may benefit from PT to address the following problem list.   PROBLEM LIST (Impacting functional limitations):  1. Decreased Strength  2. Decreased ADL/Functional Activities  3. Increased Pain INTERVENTIONS PLANNED:  1. Gait Training  2. Home Exercise Program (HEP)  3. Manual Therapy  4. Therapeutic Exercise/Strengthening   TREATMENT PLAN:  Effective Dates: 3-12-18 TO 18. Frequency/Duration: 2 times a week for 6 weeks  GOALS: (Goals have been discussed and agreed upon with patient.)  Short-Term Functional Goals: Time Frame: 4 weeks  1. Independent in initial HEP  2.  Decrease pain to < 3/10 with stair ascent and descent  Discharge Goals: Time Frame: 6 weeks  1. Independent in advanced HEP  2. 5/5 strength left quads  3. Minimal pain with stair climbing  Rehabilitation Potential For Stated Goals: Good  Regarding Whiteside Skains therapy, I certify that the treatment plan above will be carried out by a therapist or under their direction. Thank you for this referral,  Taiwo Bustos PT                 The information in this section was collected on 3-12-18 (except where otherwise noted). HISTORY:   History of Present Injury/Illness (Reason for Referral):  Pt had left TKA 2015. She completed short round of PT with minimal pain and was discharged to [facility]. Pt reports pain continued mainly with stairs and sit to stand. She reports tripping in her yard in 2018. She was concerned about damage to TKA. X-rays were negative. Pt admits pain is now about the same as before the fall. Past Medical History/Comorbidities:   Ms. Mendel General  has a past medical history of Atrial septal aneurysm; CAD (coronary artery disease); Dyslipidemia; History of breast cancer (); Hypertension; Osteoarthritis; Paroxysmal atrial fibrillation ([facility] [address] Patent foramen ovale; and Valvular heart disease. Ms. Mendel General  has a past surgical history that includes hx mastectomy (Left, ); hx hysterectomy (); hx  section; hx cholecystectomy (); hx mohs procedure (Left, ); hx cataract removal (Bilateral); and hx back surgery. Left TKA 2015.   Social History/Living Environment:     lives alone in single story home with walk in shower  Prior Level of Function/Work/Activity:  independent  Dominant Side:         LEFT  Current Medications:       Current Outpatient Prescriptions:     calcium-cholecalciferol, d3, 600-125 mg-unit tab, Take  by mouth., Disp: , Rfl:     fenofibrate (TRICOR) 160 mg tablet, Take 160 mg by mouth nightly., Disp: , Rfl:     rosuvastatin (CRESTOR) 10 mg tablet, Take 10 mg by mouth nightly., Disp: , Rfl:     benazepril (LOTENSIN) 20 mg tablet, Take 20 mg by mouth daily. Indications: HYPERTENSION, Disp: , Rfl:     amiodarone (CORDARONE) 200 mg tablet, Take 200 mg by mouth daily. Instructed to take DOS per Anesthesia guidelines. Indications: VENTRICULAR RATE CONTROL IN ATRIAL FIBRILLATION, Disp: , Rfl:     aspirin (ASPIRIN) 325 mg tablet, Take 325 mg by mouth daily. Decrease to Aspirin 81mg 5 days prior to surgery per anesthesia guidelines. , Disp: , Rfl:    Date Last Reviewed:  3/28/2018   EXAMINATION:   Observation/Orthostatic Postural Assessment:          Minimal edema left knee - surgical scar well healed with no adhesins noted  Palpation:          Mild tenderness at insertion of left IT band  ROM:          Left knee full extension and 128 flexion  Strength:          Flexion - 5/5  Extension 4+/5 left knee   CLINICAL PRESENTATION:   CLINICAL DECISION MAKING:   Outcome Measure: Tool Used: Lower Extremity Functional Scale (LEFS)  Score:  Initial: 42/80  (Date: 3-12-18 ) Most Recent: X/80 (Date: -- )   Interpretation of Score: 20 questions each scored on a 5 point scale with 0 representing \"extreme difficulty or unable to perform\" and 4 representing \"no difficulty\". The lower the score, the greater the functional disability. 80/80 represents no disability. Minimal detectable change is 9 points. Score 80 79-63 62-48 47-32 31-16 15-1 0   Modifier CH CI CJ CK CL CM CN     ? Mobility - Walking and Moving Around:     - CURRENT STATUS: CK - 40%-59% impaired, limited or restricted    - GOAL STATUS: CI - 1%-19% impaired, limited or restricted    - D/C STATUS:  ---------------To be determined---------------    Medical Necessity:   · Patient demonstrates good rehab potential due to higher previous functional level. Reason for Services/Other Comments:  · Patient continues to require modification of therapeutic interventions to increase complexity of exercises.             TREATMENT:   (In addition to Assessment/Re-Assessment sessions the following treatments were rendered)  3/28/2018  Pre-treatment Symptoms/Complaints:  Pt reports going up and down flight of stairs yesterday with no pain. She states she was stiff that evening. Pain: Initial:   Pain Intensity 1: 1  Pain Location 1: Knee  Pain Orientation 1: Left  Post Session: 1/10     THERAPEUTIC EXERCISE: (45 minutes):  Exercises per grid below to improve mobility, strength and coordination. Required minimal verbal cues to promote proper body alignment and promote proper body mechanics. Progressed resistance, range, repetitions and complexity of movement as indicated. Access Code: [email or web address]   URL: [email or web address]   Date: 03/12/2018   Prepared by:  Donn Low     Exercises  Supine Quad Set - 10 reps - 5 hold - 2x daily  Supine Heel Slide - 10 reps - 2x daily  Small Range Straight Leg Raise - 10 reps - 2x daily  Supine Knee Extension Strengthening - 10 reps - 2x daily     Date:  3-12-18 Date:  3-15-18 Date:  3-19-18 Date:  3-21-18 Date:  3-26-18 Date:  3-28-18   Activity/Exercise Parameters Parameters Parameters Parameters Parameters Parameters   HEP As above        Recumbent stepper  Level 1 x 10' Level 1 x 10' Level 1 x 10' Level 1 x 10' Level 1 x 10'   Ant step ups  X 10 BLE X 10 BLE X 10 BLE X 10 BLE X 10 BLE   Lat step ups  X 10 BLE X 10 BLE X 10 BLE X 10 BLE X 10 BLE   Ant heel taps  X 10 BLE X 10 BLE X 10 BLE X 10 BLE X 10 BLE   Incline calf  10 x 5\" 10 x 5\" 10 x 5\" 10 x 5\" 10 x 5\"   Wall squats  X 10 X 10 X 10 X 10 X 10   QS  X 20 X 20 X 20 X 20 X 20   SLR  2# x 10 2# x 10 2# x 10 3# x 10 3# x 10   SAQ  2# x 10 2# x 10 2# x 10 3# x 10 3# x 10   Standing hamstring curl  2# x 10 2# x 10 2# x 10 3# x 10 3# x 10   Standing hip abd  2# x 10 2# x 10 2# x 10 3# x 10 3# x 10   Standing hip flx  2# x 10 2# x 10 2# x 10 3# x 10 3# x 10   Backward walk    45' x 2 HHA 45' x 2 HHA 45' x 2 HHA   Side steps     45' x 2 HHA 45' x 2 HHA Treatment/Session Assessment:    · Response to Treatment:  Pt demonstrates significant improvement in form for step activities. · Compliance with Program/Exercises: Will assess as treatment progresses. · Recommendations/Intent for next treatment session: \"Next visit will focus on advancements to more challenging activities\". Future Appointments  Date Time Provider Sonia Martell   4/3/2018 1:00 PM Huan Guerrero DPT [facility]   4/4/2018 9:15 AM Bernadette Painting, PT [facility]   4/9/2018 9:15 AM [facility]   4/11/2018 9:15 AM [facility]   4/16/2018 9:15 AM [facility]   4/18/2018 9:15 AM Bernadette Painting, PT [facility]     Please explain any variance from Plan of Care.   Total Treatment Duration:  PT Patient Time In/Time Out  Time In: 0915  Time Out: [address] GALE Low

## 2018-04-02 ENCOUNTER — APPOINTMENT (OUTPATIENT)
Dept: PHYSICAL THERAPY | Age: 78
End: 2018-04-02
Payer: MEDICARE

## 2018-04-03 ENCOUNTER — HOSPITAL ENCOUNTER (OUTPATIENT)
Dept: PHYSICAL THERAPY | Age: 78
Discharge: HOME | End: 2018-04-03
Payer: MEDICARE

## 2018-04-03 PROCEDURE — 97110 THERAPEUTIC EXERCISES: CPT

## 2018-04-03 NOTE — PROGRESS NOTES
Xuan Paniagua  : 1940  [place]  Secondary: Bshsi Generic [address] [facility]  at [address]  [facility] [address], [place] [address]  [phone]   [phone]        OUTPATIENT PHYSICAL THERAPY:Daily Note 4/3/2018    ICD-10: Treatment Diagnosis:   Pain in left knee (M25.562)   Presence of left artificial knee joint (E55.550)   Precautions/Allergies:   Sulfa (sulfonamide antibiotics) and Tape [adhesive]   Fall Risk Score: 5 (? 5 = High Risk)  MD Orders: Eval and Treat MEDICAL/REFERRING DIAGNOSIS:  Low back pain [M54.5]  Other specified enthesopathies of left lower limb, excluding foot [M76.892]  Presence of left artificial knee joint [Z96.652]   DATE OF ONSET: 2015  REFERRING PHYSICIAN: Lebron Paulson MD  RETURN PHYSICIAN APPOINTMENT: not scheduled     INITIAL ASSESSMENT:  Ms. Vishal Perez presents with complaints of pain in left knee mainly with stair climbing and sit to stand. Pt had left TKA in 2015. She progressed quickly with minimal PT. She reports tripping in her yard in January, but admits that current pain was about the same before recent fall. X-ray of her knee indicated no significant changes. Objective measures indicate good ROM with slight strength decrease for left knee. Pt may benefit from PT to address the following problem list.   PROBLEM LIST (Impacting functional limitations):  1. Decreased Strength  2. Decreased ADL/Functional Activities  3. Increased Pain INTERVENTIONS PLANNED:  1. Gait Training  2. Home Exercise Program (HEP)  3. Manual Therapy  4. Therapeutic Exercise/Strengthening   TREATMENT PLAN:  Effective Dates: 3-12-18 TO 18. Frequency/Duration: 2 times a week for 6 weeks  GOALS: (Goals have been discussed and agreed upon with patient.)  Short-Term Functional Goals: Time Frame: 4 weeks  1. Independent in initial HEP  2.  Decrease pain to < 3/10 with stair ascent and descent  Discharge Goals: Time Frame: 6 weeks  1. Independent in advanced HEP  2. 5/5 strength left quads  3. Minimal pain with stair climbing  Rehabilitation Potential For Stated Goals: Good  Regarding Nabila Baltazar therapy, I certify that the treatment plan above will be carried out by a therapist or under their direction. Thank you for this referral,  Tasneem Chicas DPT                 The information in this section was collected on 3-12-18 (except where otherwise noted). HISTORY:   History of Present Injury/Illness (Reason for Referral):  Pt had left TKA 2015. She completed short round of PT with minimal pain and was discharged to [facility]. Pt reports pain continued mainly with stairs and sit to stand. She reports tripping in her yard in 2018. She was concerned about damage to TKA. X-rays were negative. Pt admits pain is now about the same as before the fall. Past Medical History/Comorbidities:   Ms. [facility]  has a past medical history of Atrial septal aneurysm; CAD (coronary artery disease); Dyslipidemia; History of breast cancer (); Hypertension; Osteoarthritis; Paroxysmal atrial fibrillation ([address] Patent foramen ovale; and Valvular heart disease. Ms. [facility]  has a past surgical history that includes hx mastectomy (Left, ); hx hysterectomy (); hx  section; hx cholecystectomy (); hx mohs procedure (Left, ); hx cataract removal (Bilateral); and hx back surgery. Left TKA 2015.   Social History/Living Environment:     lives alone in single story home with walk in shower  Prior Level of Function/Work/Activity:  independent  Dominant Side:         LEFT  Current Medications:       Current Outpatient Prescriptions:     calcium-cholecalciferol, d3, 600-125 mg-unit tab, Take  by mouth., Disp: , Rfl:     fenofibrate (TRICOR) 160 mg tablet, Take 160 mg by mouth nightly., Disp: , Rfl:     rosuvastatin (CRESTOR) 10 mg tablet, Take 10 mg by mouth nightly., Disp: , Rfl:     benazepril (LOTENSIN) 20 mg tablet, Take 20 mg by mouth daily. Indications: HYPERTENSION, Disp: , Rfl:     amiodarone (CORDARONE) 200 mg tablet, Take 200 mg by mouth daily. Instructed to take DOS per Anesthesia guidelines. Indications: VENTRICULAR RATE CONTROL IN ATRIAL FIBRILLATION, Disp: , Rfl:     aspirin (ASPIRIN) 325 mg tablet, Take 325 mg by mouth daily. Decrease to Aspirin 81mg 5 days prior to surgery per anesthesia guidelines. , Disp: , Rfl:    Date Last Reviewed:  4/3/2018   EXAMINATION:   Observation/Orthostatic Postural Assessment:          Minimal edema left knee - surgical scar well healed with no adhesins noted  Palpation:          Mild tenderness at insertion of left IT band  ROM:          Left knee full extension and 128 flexion  Strength:          Flexion - 5/5  Extension 4+/5 left knee   CLINICAL PRESENTATION:   CLINICAL DECISION MAKING:   Outcome Measure: Tool Used: Lower Extremity Functional Scale (LEFS)  Score:  Initial: 42/80  (Date: 3-12-18 ) Most Recent: X/80 (Date: -- )   Interpretation of Score: 20 questions each scored on a 5 point scale with 0 representing \"extreme difficulty or unable to perform\" and 4 representing \"no difficulty\". The lower the score, the greater the functional disability. 80/80 represents no disability. Minimal detectable change is 9 points. Score 80 79-63 62-48 47-32 31-16 15-1 0   Modifier CH CI CJ CK CL CM CN     ? Mobility - Walking and Moving Around:     - CURRENT STATUS: CK - 40%-59% impaired, limited or restricted    - GOAL STATUS: CI - 1%-19% impaired, limited or restricted    - D/C STATUS:  ---------------To be determined---------------    Medical Necessity:   · Patient demonstrates good rehab potential due to higher previous functional level. Reason for Services/Other Comments:  · Patient continues to require modification of therapeutic interventions to increase complexity of exercises.             TREATMENT:   (In addition to Assessment/Re-Assessment sessions the following treatments were rendered)  4/3/2018  Pre-treatment Symptoms/Complaints:  Pt reports she is now able to get up the steps better  Pain: Initial: 2/10     Post Session: 2/10     THERAPEUTIC EXERCISE: (45 minutes):  Exercises per grid below to improve mobility, strength and coordination. Required minimal verbal cues to promote proper body alignment and promote proper body mechanics. Progressed resistance, range, repetitions and complexity of movement as indicated. Access Code: [email or web address]   URL: [email or web address]   Date: 03/12/2018   Prepared by:  Donn Low     Exercises  Supine Quad Set - 10 reps - 5 hold - 2x daily  Supine Heel Slide - 10 reps - 2x daily  Small Range Straight Leg Raise - 10 reps - 2x daily  Supine Knee Extension Strengthening - 10 reps - 2x daily     Date:  3-12-18 Date:  3-15-18 Date:  3-19-18 Date:  3-21-18 Date:  3-26-18 Date:  3-28-18 Date:  4-3-18   Activity/Exercise Parameters Parameters Parameters Parameters Parameters Parameters    HEP As above         Recumbent stepper  Level 1 x 10' Level 1 x 10' Level 1 x 10' Level 1 x 10' Level 1 x 10' Level 1 x 10'   Ant step ups  X 10 BLE X 10 BLE X 10 BLE X 10 BLE X 10 BLE X 10 BLE   Lat step ups  X 10 BLE X 10 BLE X 10 BLE X 10 BLE X 10 BLE X 10 BLE   Ant heel taps  X 10 BLE X 10 BLE X 10 BLE X 10 BLE X 10 BLE X 10 BLE   Incline calf  10 x 5\" 10 x 5\" 10 x 5\" 10 x 5\" 10 x 5\" 10 x 5\"   Wall squats  X 10 X 10 X 10 X 10 X 10 X 10   QS  X 20 X 20 X 20 X 20 X 20 X 20   SLR  2# x 10 2# x 10 2# x 10 3# x 10 3# x 10 3# x 10   SAQ  2# x 10 2# x 10 2# x 10 3# x 10 3# x 10 3# x 10   Standing hamstring curl  2# x 10 2# x 10 2# x 10 3# x 10 3# x 10 3# x 10   Standing hip abd  2# x 10 2# x 10 2# x 10 3# x 10 3# x 10 3# x 10   Standing hip flx  2# x 10 2# x 10 2# x 10 3# x 10 3# x 10 3# x 10   Backward walk    45' x 2 HHA 45' x 2 HHA 45' x 2 HHA 45' x 2 HHA   Side steps     45' x 2 HHA 45' x 2 HHA 39' x 2 A       Treatment/Session Assessment:    · Response to Treatment:  Pt completes all exercises without fatigue or pain. Demonstrates good technique no cues needed. Progress as tolerated. · Compliance with Program/Exercises: Will assess as treatment progresses. · Recommendations/Intent for next treatment session: \"Next visit will focus on advancements to more challenging activities\". Future Appointments  Date Time Provider Sonia Martell   4/4/2018 9:15 AM Candy Danielim, PT [facility]   4/9/2018 9:15 AM Candy Slim, PT [facility]   4/11/2018 9:15 AM Candy Slim, PT [facility]   4/16/2018 9:15 AM Candy Slim, PT [facility]   4/18/2018 9:15 AM Candy Slim, PT [facility]     Please explain any variance from Plan of Care.   Total Treatment Duration:  PT Patient Time In/Time Out  Time In: 1300  Time Out: Srikanth Iraheta DPT

## 2018-04-04 ENCOUNTER — HOSPITAL ENCOUNTER (OUTPATIENT)
Dept: PHYSICAL THERAPY | Age: 78
Discharge: HOME | End: 2018-04-04
Payer: MEDICARE

## 2018-04-04 PROCEDURE — 97110 THERAPEUTIC EXERCISES: CPT

## 2018-04-04 NOTE — PROGRESS NOTES
Daisha Severe  : 1940  [place]  Secondary: Bshsi Generic [address] [facility]  at [facility]  [facility], [address], [place] [address]  [phone]   [phone]        OUTPATIENT PHYSICAL THERAPY:Daily Note 2018    ICD-10: Treatment Diagnosis:   Pain in left knee (M25.562)   Presence of left artificial knee joint (Z91.812)   Precautions/Allergies:   Sulfa (sulfonamide antibiotics) and Tape [adhesive]   Fall Risk Score: 5 (? 5 = High Risk)  MD Orders: Eval and Treat MEDICAL/REFERRING DIAGNOSIS:  Low back pain [M54.5]  Other specified enthesopathies of left lower limb, excluding foot [M76.892]  Presence of left artificial knee joint [Z96.652]   DATE OF ONSET: 2015  REFERRING PHYSICIAN: Christian Hatfield MD  RETURN PHYSICIAN APPOINTMENT: not scheduled     INITIAL ASSESSMENT:  Ms. Margarita Marion presents with complaints of pain in left knee mainly with stair climbing and sit to stand. Pt had left TKA in 2015. She progressed quickly with minimal PT. She reports tripping in her yard in January, but admits that current pain was about the same before recent fall. X-ray of her knee indicated no significant changes. Objective measures indicate good ROM with slight strength decrease for left knee. Pt may benefit from PT to address the following problem list.   PROBLEM LIST (Impacting functional limitations):  1. Decreased Strength  2. Decreased ADL/Functional Activities  3. Increased Pain INTERVENTIONS PLANNED:  1. Gait Training  2. Home Exercise Program (HEP)  3. Manual Therapy  4. Therapeutic Exercise/Strengthening   TREATMENT PLAN:  Effective Dates: 3-12-18 TO 18. Frequency/Duration: 2 times a week for 6 weeks  GOALS: (Goals have been discussed and agreed upon with patient.)  Short-Term Functional Goals: Time Frame: 4 weeks  1. Independent in initial HEP  2.  Decrease pain to < 3/10 with stair ascent and descent  Discharge Goals: Time Frame: 6 weeks  1. Independent in advanced HEP  2. 5/5 strength left quads  3. Minimal pain with stair climbing  Rehabilitation Potential For Stated Goals: Good  Regarding Rohit Ruck therapy, I certify that the treatment plan above will be carried out by a therapist or under their direction. Thank you for this referral,  Evangelina Aldridge, PT                 The information in this section was collected on 3-12-18 (except where otherwise noted). HISTORY:   History of Present Injury/Illness (Reason for Referral):  Pt had left TKA 2015. She completed short round of PT with minimal pain and was discharged to [facility]. Pt reports pain continued mainly with stairs and sit to stand. She reports tripping in her yard in 2018. She was concerned about damage to TKA. X-rays were negative. Pt admits pain is now about the same as before the fall. Past Medical History/Comorbidities:   Ms. [facility]  has a past medical history of Atrial septal aneurysm; CAD (coronary artery disease); Dyslipidemia; History of breast cancer (); Hypertension; Osteoarthritis; Paroxysmal atrial fibrillation ([facility] [address] Patent foramen ovale; and Valvular heart disease. Ms. [facility]  has a past surgical history that includes hx mastectomy (Left, ); hx hysterectomy (); hx  section; hx cholecystectomy (); hx mohs procedure (Left, ); hx cataract removal (Bilateral); and hx back surgery. Left TKA 2015.   Social History/Living Environment:     lives alone in single story home with walk in shower  Prior Level of Function/Work/Activity:  independent  Dominant Side:         LEFT  Current Medications:       Current Outpatient Prescriptions:     calcium-cholecalciferol, d3, 600-125 mg-unit tab, Take  by mouth., Disp: , Rfl:     fenofibrate (TRICOR) 160 mg tablet, Take 160 mg by mouth nightly., Disp: , Rfl:     rosuvastatin (CRESTOR) 10 mg tablet, Take 10 mg by mouth nightly., Disp: , Rfl:     benazepril (LOTENSIN) 20 mg tablet, Take 20 mg by mouth daily. Indications: HYPERTENSION, Disp: , Rfl:     amiodarone (CORDARONE) 200 mg tablet, Take 200 mg by mouth daily. Instructed to take DOS per Anesthesia guidelines. Indications: VENTRICULAR RATE CONTROL IN ATRIAL FIBRILLATION, Disp: , Rfl:     aspirin (ASPIRIN) 325 mg tablet, Take 325 mg by mouth daily. Decrease to Aspirin 81mg 5 days prior to surgery per anesthesia guidelines. , Disp: , Rfl:    Date Last Reviewed:  4/4/2018   EXAMINATION:   Observation/Orthostatic Postural Assessment:          Minimal edema left knee - surgical scar well healed with no adhesins noted  Palpation:          Mild tenderness at insertion of left IT band  ROM:          Left knee full extension and 128 flexion  Strength:          Flexion - 5/5  Extension 4+/5 left knee   CLINICAL PRESENTATION:   CLINICAL DECISION MAKING:   Outcome Measure: Tool Used: Lower Extremity Functional Scale (LEFS)  Score:  Initial: 42/80  (Date: 3-12-18 ) Most Recent: X/80 (Date: -- )   Interpretation of Score: 20 questions each scored on a 5 point scale with 0 representing \"extreme difficulty or unable to perform\" and 4 representing \"no difficulty\". The lower the score, the greater the functional disability. 80/80 represents no disability. Minimal detectable change is 9 points. Score 80 79-63 62-48 47-32 31-16 15-1 0   Modifier CH CI CJ CK CL CM CN     ? Mobility - Walking and Moving Around:     - CURRENT STATUS: CK - 40%-59% impaired, limited or restricted    - GOAL STATUS: CI - 1%-19% impaired, limited or restricted    - D/C STATUS:  ---------------To be determined---------------    Medical Necessity:   · Patient demonstrates good rehab potential due to higher previous functional level. Reason for Services/Other Comments:  · Patient continues to require modification of therapeutic interventions to increase complexity of exercises.             TREATMENT:   (In addition to Assessment/Re-Assessment sessions the following treatments were rendered)  4/4/2018  Pre-treatment Symptoms/Complaints:  Pt reports she is a little sore from yesterday's session, but can really see a difference in function. Pain: Initial: 2/10  Pain Intensity 1: 1  Pain Location 1: Knee  Pain Orientation 1: Left  Post Session: 1/10     THERAPEUTIC EXERCISE: (45 minutes):  Exercises per grid below to improve mobility, strength and coordination. Required minimal verbal cues to promote proper body alignment and promote proper body mechanics. Progressed resistance, range, repetitions and complexity of movement as indicated. Access Code: [email or web address]   URL: [email or web address]   Date: 03/12/2018   Prepared by:  Donn Low     Exercises  Supine Quad Set - 10 reps - 5 hold - 2x daily  Supine Heel Slide - 10 reps - 2x daily  Small Range Straight Leg Raise - 10 reps - 2x daily  Supine Knee Extension Strengthening - 10 reps - 2x daily     Date:  3-12-18 Date:  3-15-18 Date:  3-19-18 Date:  3-21-18 Date:  3-26-18 Date:  3-28-18 Date:  4-3-18 Date:  4-4-18   Activity/Exercise Parameters Parameters Parameters Parameters Parameters Parameters  Parameters   HEP As above          Recumbent stepper  Level 1 x 10' Level 1 x 10' Level 1 x 10' Level 1 x 10' Level 1 x 10' Level 1 x 10' Level 1 x 10'   Ant step ups  X 10 BLE X 10 BLE X 10 BLE X 10 BLE X 10 BLE X 10 BLE X 10 BLE   Lat step ups  X 10 BLE X 10 BLE X 10 BLE X 10 BLE X 10 BLE X 10 BLE X 10 BLE   Ant heel taps  X 10 BLE X 10 BLE X 10 BLE X 10 BLE X 10 BLE X 10 BLE X 10 BLE   Incline calf  10 x 5\" 10 x 5\" 10 x 5\" 10 x 5\" 10 x 5\" 10 x 5\" 10 x 5\"   Wall squats  X 10 X 10 X 10 X 10 X 10 X 10 X 10   QS  X 20 X 20 X 20 X 20 X 20 X 20 X 20   SLR  2# x 10 2# x 10 2# x 10 3# x 10 3# x 10 3# x 10 3# x 10   SAQ  2# x 10 2# x 10 2# x 10 3# x 10 3# x 10 3# x 10 3# x 10   Standing hamstring curl  2# x 10 2# x 10 2# x 10 3# x 10 3# x 10 3# x 10 3# x 10   Standing hip abd 2# x 10 2# x 10 2# x 10 3# x 10 3# x 10 3# x 10 3# x 10   Standing hip flx  2# x 10 2# x 10 2# x 10 3# x 10 3# x 10 3# x 10 3# x 10   Backward walk    45' x 2 HHA 45' x 2 HHA 45' x 2 HHA 45' x 2 HHA 45' x 2   Side steps     45' x 2 HHA 45' x 2 HHA 45' x 2 HHA 45' x 2       Treatment/Session Assessment:    · Response to Treatment:  Pt completes backward walk and side steps without assist today. Pt states she feels her balance is improving. .   · Compliance with Program/Exercises: Will assess as treatment progresses. · Recommendations/Intent for next treatment session: \"Next visit will focus on advancements to more challenging activities\". Future Appointments  Date Time Provider Sonia Martell   4/9/2018 9:15 AM Dane Mcintosh, PT [facility]   4/11/2018 9:15 AM Dane Mcintosh PT [facility]   4/16/2018 9:15 AM Dane Mcintosh PT [facility]   4/18/2018 9:15 AM Dane Mcintosh, PT [facility]     Please explain any variance from Plan of Care.   Total Treatment Duration:  PT Patient Time In/Time Out  Time In: 0915  Time Out: [address] GALE Low

## 2018-04-09 ENCOUNTER — HOSPITAL ENCOUNTER (OUTPATIENT)
Dept: PHYSICAL THERAPY | Age: 78
Discharge: HOME | End: 2018-04-09
Payer: MEDICARE

## 2018-04-09 PROCEDURE — 97110 THERAPEUTIC EXERCISES: CPT

## 2018-04-09 NOTE — PROGRESS NOTES
Vero Nava  : 1940  [place]  Secondary: Bshsi Generic [address] [facility]  at [address]  [facility] [address], [place] [address]  [phone]   [phone]        OUTPATIENT PHYSICAL THERAPY:Daily Note 2018    ICD-10: Treatment Diagnosis:   Pain in left knee (M25.562)   Presence of left artificial knee joint (B35.912)   Precautions/Allergies:   Sulfa (sulfonamide antibiotics) and Tape [adhesive]   Fall Risk Score: 5 (? 5 = High Risk)  MD Orders: Eval and Treat MEDICAL/REFERRING DIAGNOSIS:  Low back pain [M54.5]  Other specified enthesopathies of left lower limb, excluding foot [M76.892]  Presence of left artificial knee joint [Z96.652]   DATE OF ONSET: 2015  REFERRING PHYSICIAN: Kaykay Rodriguez MD  RETURN PHYSICIAN APPOINTMENT: not scheduled     INITIAL ASSESSMENT:  Ms. Hollie Nino presents with complaints of pain in left knee mainly with stair climbing and sit to stand. Pt had left TKA in 2015. She progressed quickly with minimal PT. She reports tripping in her yard in January, but admits that current pain was about the same before recent fall. X-ray of her knee indicated no significant changes. Objective measures indicate good ROM with slight strength decrease for left knee. Pt may benefit from PT to address the following problem list.   PROBLEM LIST (Impacting functional limitations):  1. Decreased Strength  2. Decreased ADL/Functional Activities  3. Increased Pain INTERVENTIONS PLANNED:  1. Gait Training  2. Home Exercise Program (HEP)  3. Manual Therapy  4. Therapeutic Exercise/Strengthening   TREATMENT PLAN:  Effective Dates: 3-12-18 TO 18. Frequency/Duration: 2 times a week for 6 weeks  GOALS: (Goals have been discussed and agreed upon with patient.)  Short-Term Functional Goals: Time Frame: 4 weeks  1. Independent in initial HEP  2.  Decrease pain to < 3/10 with stair ascent and descent  Discharge Goals: Time Frame: 6 weeks  1. Independent in advanced HEP  2. 5/5 strength left quads  3. Minimal pain with stair climbing  Rehabilitation Potential For Stated Goals: Good  Regarding Doylene Copper therapy, I certify that the treatment plan above will be carried out by a therapist or under their direction. Thank you for this referral,  Janel Grover PT                 The information in this section was collected on 3-12-18 (except where otherwise noted). HISTORY:   History of Present Injury/Illness (Reason for Referral):  Pt had left TKA 2015. She completed short round of PT with minimal pain and was discharged to [facility]. Pt reports pain continued mainly with stairs and sit to stand. She reports tripping in her yard in 2018. She was concerned about damage to TKA. X-rays were negative. Pt admits pain is now about the same as before the fall. Past Medical History/Comorbidities:   Ms. Hollie Nino  has a past medical history of Atrial septal aneurysm; CAD (coronary artery disease); Dyslipidemia; History of breast cancer (); Hypertension; Osteoarthritis; Paroxysmal atrial fibrillation ([facility] [address] Patent foramen ovale; and Valvular heart disease. Ms. Hollie Nino  has a past surgical history that includes hx mastectomy (Left, ); hx hysterectomy (); hx  section; hx cholecystectomy (); hx mohs procedure (Left, ); hx cataract removal (Bilateral); and hx back surgery. Left TKA 2015.   Social History/Living Environment:     lives alone in single story home with walk in shower  Prior Level of Function/Work/Activity:  independent  Dominant Side:         LEFT  Current Medications:       Current Outpatient Prescriptions:     calcium-cholecalciferol, d3, 600-125 mg-unit tab, Take  by mouth., Disp: , Rfl:     fenofibrate (TRICOR) 160 mg tablet, Take 160 mg by mouth nightly., Disp: , Rfl:     rosuvastatin (CRESTOR) 10 mg tablet, Take 10 mg by mouth nightly., Disp: , Rfl:     benazepril (LOTENSIN) 20 mg tablet, Take 20 mg by mouth daily. Indications: HYPERTENSION, Disp: , Rfl:     amiodarone (CORDARONE) 200 mg tablet, Take 200 mg by mouth daily. Instructed to take DOS per Anesthesia guidelines. Indications: VENTRICULAR RATE CONTROL IN ATRIAL FIBRILLATION, Disp: , Rfl:     aspirin (ASPIRIN) 325 mg tablet, Take 325 mg by mouth daily. Decrease to Aspirin 81mg 5 days prior to surgery per anesthesia guidelines. , Disp: , Rfl:    Date Last Reviewed:  4/9/2018   EXAMINATION:   Observation/Orthostatic Postural Assessment:          Minimal edema left knee - surgical scar well healed with no adhesins noted  Palpation:          Mild tenderness at insertion of left IT band  ROM:          Left knee full extension and 128 flexion  Strength:          Flexion - 5/5  Extension 4+/5 left knee   CLINICAL PRESENTATION:   CLINICAL DECISION MAKING:   Outcome Measure: Tool Used: Lower Extremity Functional Scale (LEFS)  Score:  Initial: 42/80  (Date: 3-12-18 ) Most Recent: X/80 (Date: -- )   Interpretation of Score: 20 questions each scored on a 5 point scale with 0 representing \"extreme difficulty or unable to perform\" and 4 representing \"no difficulty\". The lower the score, the greater the functional disability. 80/80 represents no disability. Minimal detectable change is 9 points. Score 80 79-63 62-48 47-32 31-16 15-1 0   Modifier CH CI CJ CK CL CM CN     ? Mobility - Walking and Moving Around:     - CURRENT STATUS: CK - 40%-59% impaired, limited or restricted    - GOAL STATUS: CI - 1%-19% impaired, limited or restricted    - D/C STATUS:  ---------------To be determined---------------    Medical Necessity:   · Patient demonstrates good rehab potential due to higher previous functional level. Reason for Services/Other Comments:  · Patient continues to require modification of therapeutic interventions to increase complexity of exercises.             TREATMENT:   (In addition to Assessment/Re-Assessment sessions the following treatments were rendered)  4/9/2018  Pre-treatment Symptoms/Complaints:  Pt reports being able to do steps at [employer] with minimal pain. Pain: Initial: 2/10  Pain Intensity 1: 1  Pain Location 1: Knee  Pain Orientation 1: Left  Post Session: 1/10     THERAPEUTIC EXERCISE: (45 minutes):  Exercises per grid below to improve mobility, strength and coordination. Required minimal verbal cues to promote proper body alignment and promote proper body mechanics. Progressed resistance, range, repetitions and complexity of movement as indicated. Access Code: [email or web address]   URL: [email or web address]   Date: 03/12/2018   Prepared by:  Donn Low     Exercises  Supine Quad Set - 10 reps - 5 hold - 2x daily  Supine Heel Slide - 10 reps - 2x daily  Small Range Straight Leg Raise - 10 reps - 2x daily  Supine Knee Extension Strengthening - 10 reps - 2x daily     Date:  3-12-18 Date:  3-15-18 Date:  3-19-18 Date:  3-21-18 Date:  3-26-18 Date:  3-28-18 Date:  4-3-18 Date:  4-4-18 Date:  4-9-18   Activity/Exercise Parameters Parameters Parameters Parameters Parameters Parameters  Parameters Parameters   HEP As above           Recumbent stepper  Level 1 x 10' Level 1 x 10' Level 1 x 10' Level 1 x 10' Level 1 x 10' Level 1 x 10' Level 1 x 10' Level 1 x 10'   Ant step ups  X 10 BLE X 10 BLE X 10 BLE X 10 BLE X 10 BLE X 10 BLE X 10 BLE X 10 BLE   Lat step ups  X 10 BLE X 10 BLE X 10 BLE X 10 BLE X 10 BLE X 10 BLE X 10 BLE X 10 BLE   Ant heel taps  X 10 BLE X 10 BLE X 10 BLE X 10 BLE X 10 BLE X 10 BLE X 10 BLE X 10 BLE   Incline calf  10 x 5\" 10 x 5\" 10 x 5\" 10 x 5\" 10 x 5\" 10 x 5\" 10 x 5\" 10 x 5\"   Wall squats  X 10 X 10 X 10 X 10 X 10 X 10 X 10 X 10   QS  X 20 X 20 X 20 X 20 X 20 X 20 X 20 X 20   SLR  2# x 10 2# x 10 2# x 10 3# x 10 3# x 10 3# x 10 3# x 10 4# x 10   SAQ  2# x 10 2# x 10 2# x 10 3# x 10 3# x 10 3# x 10 3# x 10 4# x 10   Standing hamstring curl  2# x 10 2# x 10 2# x 10 3# x 10 3# x 10 3# x 10 3# x 10 4# x 10   Standing hip abd  2# x 10 2# x 10 2# x 10 3# x 10 3# x 10 3# x 10 3# x 10 4# x 10   Standing hip flx  2# x 10 2# x 10 2# x 10 3# x 10 3# x 10 3# x 10 3# x 10 4# x 10   Backward walk    45' x 2 HHA 45' x 2 HHA 45' x 2 HHA 45' x 2 HHA 45' x 2 45' x 2   Side steps     45' x 2 HHA 39' x 2 HHA 39' x 2 HHA 39' x 2 45' x 2       Treatment/Session Assessment:    · Response to Treatment:  Pt continues to demonstrate improved function with less pain. · Compliance with Program/Exercises: Will assess as treatment progresses. · Recommendations/Intent for next treatment session: \"Next visit will focus on advancements to more challenging activities\". Future Appointments  Date Time Provider Sonia Martell   4/11/2018 9:15 AM Marcelle Carrel, PT [facility]   4/16/2018 9:15 AM Marcelle Carrel, PT [facility]   4/18/2018 9:15 AM [facility]     Please explain any variance from Plan of Care.   Total Treatment Duration:  PT Patient Time In/Time Out  Time In: 0915  Time Out: [address] GALE Low

## 2018-04-11 ENCOUNTER — HOSPITAL ENCOUNTER (OUTPATIENT)
Dept: PHYSICAL THERAPY | Age: 78
Discharge: HOME | End: 2018-04-11
Payer: MEDICARE

## 2018-04-11 PROCEDURE — G8978 MOBILITY CURRENT STATUS: HCPCS

## 2018-04-11 PROCEDURE — G8979 MOBILITY GOAL STATUS: HCPCS

## 2018-04-11 PROCEDURE — 97110 THERAPEUTIC EXERCISES: CPT

## 2018-04-11 NOTE — PROGRESS NOTES
Jase Saleem  : 1940  [place]  Secondary: Bshsi Generic [address] [facility]  at [address]  [place] [address], [address], [place] [address]  [phone]   [phone]        OUTPATIENT PHYSICAL THERAPY:Daily Note 2018    ICD-10: Treatment Diagnosis:   Pain in left knee (M25.562)   Presence of left artificial knee joint (N16.887)   Precautions/Allergies:   Sulfa (sulfonamide antibiotics) and Tape [adhesive]   Fall Risk Score: 5 (? 5 = High Risk)  MD Orders: Eval and Treat MEDICAL/REFERRING DIAGNOSIS:  Low back pain [M54.5]  Other specified enthesopathies of left lower limb, excluding foot [M76.892]  Presence of left artificial knee joint [Z96.652]   DATE OF ONSET: 2015  REFERRING PHYSICIAN: Kvng Carrasco MD  RETURN PHYSICIAN APPOINTMENT: not scheduled     INITIAL ASSESSMENT:  Ms. Foreign Salamanca presents with complaints of pain in left knee mainly with stair climbing and sit to stand. Pt had left TKA in 2015. She progressed quickly with minimal PT. She reports tripping in her yard in January, but admits that current pain was about the same before recent fall. X-ray of her knee indicated no significant changes. Objective measures indicate good ROM with slight strength decrease for left knee. Pt may benefit from PT to address the following problem list.   PROBLEM LIST (Impacting functional limitations):  1. Decreased Strength  2. Decreased ADL/Functional Activities  3. Increased Pain INTERVENTIONS PLANNED:  1. Gait Training  2. Home Exercise Program (HEP)  3. Manual Therapy  4. Therapeutic Exercise/Strengthening   TREATMENT PLAN:  Effective Dates: 3-12-18 TO 18. Frequency/Duration: 2 times a week for 6 weeks  GOALS: (Goals have been discussed and agreed upon with patient.)  Short-Term Functional Goals: Time Frame: 4 weeks  1. Independent in initial HEP  2.  Decrease pain to < 3/10 with stair ascent and descent  Discharge Goals: Time Frame: 6 weeks  1. Independent in advanced HEP  2. 5/5 strength left quads  3. Minimal pain with stair climbing  Rehabilitation Potential For Stated Goals: Good  Regarding Ward Bagley therapy, I certify that the treatment plan above will be carried out by a therapist or under their direction. Thank you for this referral,  Mckay Camarena, PT                 The information in this section was collected on 3-12-18 (except where otherwise noted). HISTORY:   History of Present Injury/Illness (Reason for Referral):  Pt had left TKA 2015. She completed short round of PT with minimal pain and was discharged to [facility]. Pt reports pain continued mainly with stairs and sit to stand. She reports tripping in her yard in 2018. She was concerned about damage to TKA. X-rays were negative. Pt admits pain is now about the same as before the fall. Past Medical History/Comorbidities:   Ms. Alia Angeles  has a past medical history of Atrial septal aneurysm; CAD (coronary artery disease); Dyslipidemia; History of breast cancer (); Hypertension; Osteoarthritis; Paroxysmal atrial fibrillation ([facility] [address] Patent foramen ovale; and Valvular heart disease. Ms. Alia nAgeles  has a past surgical history that includes hx mastectomy (Left, ); hx hysterectomy (); hx  section; hx cholecystectomy (); hx mohs procedure (Left, ); hx cataract removal (Bilateral); and hx back surgery. Left TKA 2015.   Social History/Living Environment:     lives alone in single story home with walk in shower  Prior Level of Function/Work/Activity:  independent  Dominant Side:         LEFT  Current Medications:       Current Outpatient Prescriptions:     calcium-cholecalciferol, d3, 600-125 mg-unit tab, Take  by mouth., Disp: , Rfl:     fenofibrate (TRICOR) 160 mg tablet, Take 160 mg by mouth nightly., Disp: , Rfl:     rosuvastatin (CRESTOR) 10 mg tablet, Take 10 mg by mouth nightly., Disp: , Rfl:     benazepril (LOTENSIN) 20 mg tablet, Take 20 mg by mouth daily. Indications: HYPERTENSION, Disp: , Rfl:     amiodarone (CORDARONE) 200 mg tablet, Take 200 mg by mouth daily. Instructed to take DOS per Anesthesia guidelines. Indications: VENTRICULAR RATE CONTROL IN ATRIAL FIBRILLATION, Disp: , Rfl:     aspirin (ASPIRIN) 325 mg tablet, Take 325 mg by mouth daily. Decrease to Aspirin 81mg 5 days prior to surgery per anesthesia guidelines. , Disp: , Rfl:    Date Last Reviewed:  4/11/2018   EXAMINATION:   Observation/Orthostatic Postural Assessment:          Minimal edema left knee - surgical scar well healed with no adhesins noted  Palpation:          Mild tenderness at insertion of left IT band  ROM:          Left knee full extension and 128 flexion  Strength:          Flexion - 5/5  Extension 4+/5 left knee   CLINICAL PRESENTATION:   CLINICAL DECISION MAKING:   Outcome Measure: Tool Used: Lower Extremity Functional Scale (LEFS)  Score:  Initial: 42/80  (Date: 3-12-18 ) Most Recent: 50/80 (Date: -- )   Interpretation of Score: 20 questions each scored on a 5 point scale with 0 representing \"extreme difficulty or unable to perform\" and 4 representing \"no difficulty\". The lower the score, the greater the functional disability. 80/80 represents no disability. Minimal detectable change is 9 points. Score 80 79-63 62-48 47-32 31-16 15-1 0   Modifier CH CI CJ CK CL CM CN     ? Mobility - Walking and Moving Around:     - CURRENT STATUS: CJ - 20%-39% impaired, limited or restricted    - GOAL STATUS: CI - 1%-19% impaired, limited or restricted    - D/C STATUS:  ---------------To be determined---------------    Medical Necessity:   · Patient demonstrates good rehab potential due to higher previous functional level. Reason for Services/Other Comments:  · Patient continues to require modification of therapeutic interventions to increase complexity of exercises.             TREATMENT:   (In addition to Assessment/Re-Assessment sessions the following treatments were rendered)  4/11/2018  Pre-treatment Symptoms/Complaints:  Pt reports being significant fatigue today after a family event last night, but no real pain with the prolonged standing. Pain: Initial:   Pain Intensity 1: 1  Pain Location 1: Knee  Pain Orientation 1: Left  Post Session: 1/10     THERAPEUTIC EXERCISE: (45 minutes):  Exercises per grid below to improve mobility, strength and coordination. Required minimal verbal cues to promote proper body alignment and promote proper body mechanics. Progressed resistance, range, repetitions and complexity of movement as indicated. Access Code: [email or web address]   URL: [email or web address]   Date: 03/12/2018   Prepared by:  Donn Low     Exercises  Supine Quad Set - 10 reps - 5 hold - 2x daily  Supine Heel Slide - 10 reps - 2x daily  Small Range Straight Leg Raise - 10 reps - 2x daily  Supine Knee Extension Strengthening - 10 reps - 2x daily     Date:  3-15-18 Date:  3-19-18 Date:  3-21-18 Date:  3-26-18 Date:  3-28-18 Date:  4-3-18 Date:  4-4-18 Date:  4-9-18 Date:  4-11-18   Activity/Exercise Parameters Parameters Parameters Parameters Parameters  Parameters Parameters Parameters   HEP            Recumbent stepper Level 1 x 10' Level 1 x 10' Level 1 x 10' Level 1 x 10' Level 1 x 10' Level 1 x 10' Level 1 x 10' Level 1 x 10' Level 1 x 10'   Ant step ups X 10 BLE X 10 BLE X 10 BLE X 10 BLE X 10 BLE X 10 BLE X 10 BLE X 10 BLE X 10 BLE   Lat step ups X 10 BLE X 10 BLE X 10 BLE X 10 BLE X 10 BLE X 10 BLE X 10 BLE X 10 BLE X 10 BLE   Ant heel taps X 10 BLE X 10 BLE X 10 BLE X 10 BLE X 10 BLE X 10 BLE X 10 BLE X 10 BLE X 10 BLE   Incline calf 10 x 5\" 10 x 5\" 10 x 5\" 10 x 5\" 10 x 5\" 10 x 5\" 10 x 5\" 10 x 5\" 10 x 5\"   Wall squats X 10 X 10 X 10 X 10 X 10 X 10 X 10 X 10 X 10   QS X 20 X 20 X 20 X 20 X 20 X 20 X 20 X 20 X 20   SLR 2# x 10 2# x 10 2# x 10 3# x 10 3# x 10 3# x 10 3# x 10 4# x 10 4# x 10   SAQ 2# x 10 2# x 10 2# x 10 3# x 10 3# x 10 3# x 10 3# x 10 4# x 10 4# x 10   Standing hamstring curl 2# x 10 2# x 10 2# x 10 3# x 10 3# x 10 3# x 10 3# x 10 4# x 10 4# x 10   Standing hip abd 2# x 10 2# x 10 2# x 10 3# x 10 3# x 10 3# x 10 3# x 10 4# x 10 4# x 10   Standing hip flx 2# x 10 2# x 10 2# x 10 3# x 10 3# x 10 3# x 10 3# x 10 4# x 10 4# x 10   Backward walk   45' x 2 HHA 45' x 2 HHA 45' x 2 HHA 39' x 2 HHA 39' x 2 39' x 2    Side steps    45' x 2 HHA 39' x 2 HHA 39' x 2 HHA 39' x 2 45' x 2        Treatment/Session Assessment:    · Response to Treatment:  Pt demonstrates improvement in LEFS score. · Compliance with Program/Exercises: Will assess as treatment progresses. · Recommendations/Intent for next treatment session: \"Next visit will focus on advancements to more challenging activities\". Future Appointments  Date Time Provider Sonia Martell   4/16/2018 9:15 AM GALE Perkins   4/18/2018 9:15 AM GALE Perkins [facility]     Please explain any variance from Plan of Care.   Total Treatment Duration:  PT Patient Time In/Time Out  Time In: 0915  Time Out: [address] GALE Low

## 2018-04-16 ENCOUNTER — HOSPITAL ENCOUNTER (OUTPATIENT)
Dept: PHYSICAL THERAPY | Age: 78
Discharge: HOME | End: 2018-04-16
Payer: MEDICARE

## 2018-04-16 PROCEDURE — 97110 THERAPEUTIC EXERCISES: CPT

## 2018-04-16 NOTE — PROGRESS NOTES
Jimena Yanez  : 1940  [place]  Secondary: Bshsi Generic [address] [facility]  at [address]  [facility] [place] [address], [place] [address]  [phone]   [phone]        OUTPATIENT PHYSICAL THERAPY:Daily Note 2018    ICD-10: Treatment Diagnosis:   Pain in left knee (M25.562)   Presence of left artificial knee joint (O05.171)   Precautions/Allergies:   Sulfa (sulfonamide antibiotics) and Tape [adhesive]   Fall Risk Score: 5 (? 5 = High Risk)  MD Orders: Eval and Treat MEDICAL/REFERRING DIAGNOSIS:  Low back pain [M54.5]  Other specified enthesopathies of left lower limb, excluding foot [M76.892]  Presence of left artificial knee joint [Z96.652]   DATE OF ONSET: 2015  REFERRING PHYSICIAN: Karolina Moran MD  RETURN PHYSICIAN APPOINTMENT: not scheduled     INITIAL ASSESSMENT:  Ms. Kelvin Pepe presents with complaints of pain in left knee mainly with stair climbing and sit to stand. Pt had left TKA in 2015. She progressed quickly with minimal PT. She reports tripping in her yard in January, but admits that current pain was about the same before recent fall. X-ray of her knee indicated no significant changes. Objective measures indicate good ROM with slight strength decrease for left knee. Pt may benefit from PT to address the following problem list.   PROBLEM LIST (Impacting functional limitations):  1. Decreased Strength  2. Decreased ADL/Functional Activities  3. Increased Pain INTERVENTIONS PLANNED:  1. Gait Training  2. Home Exercise Program (HEP)  3. Manual Therapy  4. Therapeutic Exercise/Strengthening   TREATMENT PLAN:  Effective Dates: 3-12-18 TO 18. Frequency/Duration: 2 times a week for 6 weeks  GOALS: (Goals have been discussed and agreed upon with patient.)  Short-Term Functional Goals: Time Frame: 4 weeks  1. Independent in initial HEP  2.  Decrease pain to < 3/10 with stair ascent and descent  Discharge Goals: Time Frame: 6 weeks  1. Independent in advanced HEP  2. 5/5 strength left quads  3. Minimal pain with stair climbing  Rehabilitation Potential For Stated Goals: Good  Regarding Everette Peña therapy, I certify that the treatment plan above will be carried out by a therapist or under their direction. Thank you for this referral,  Jeff Crews, PT                 The information in this section was collected on 3-12-18 (except where otherwise noted). HISTORY:   History of Present Injury/Illness (Reason for Referral):  Pt had left TKA 2015. She completed short round of PT with minimal pain and was discharged to [facility]. Pt reports pain continued mainly with stairs and sit to stand. She reports tripping in her yard in 2018. She was concerned about damage to TKA. X-rays were negative. Pt admits pain is now about the same as before the fall. Past Medical History/Comorbidities:   Ms. Rocael Ray  has a past medical history of Atrial septal aneurysm; CAD (coronary artery disease); Dyslipidemia; History of breast cancer (); Hypertension; Osteoarthritis; Paroxysmal atrial fibrillation ([facility] [address] Patent foramen ovale; and Valvular heart disease. Ms. Rocael Ray  has a past surgical history that includes hx mastectomy (Left, ); hx hysterectomy (); hx  section; hx cholecystectomy (); hx mohs procedure (Left, ); hx cataract removal (Bilateral); and hx back surgery. Left TKA 2015.   Social History/Living Environment:     lives alone in single story home with walk in shower  Prior Level of Function/Work/Activity:  independent  Dominant Side:         LEFT  Current Medications:       Current Outpatient Prescriptions:     calcium-cholecalciferol, d3, 600-125 mg-unit tab, Take  by mouth., Disp: , Rfl:     fenofibrate (TRICOR) 160 mg tablet, Take 160 mg by mouth nightly., Disp: , Rfl:     rosuvastatin (CRESTOR) 10 mg tablet, Take 10 mg by mouth nightly., Disp: , Rfl:     benazepril (LOTENSIN) 20 mg tablet, Take 20 mg by mouth daily. Indications: HYPERTENSION, Disp: , Rfl:     amiodarone (CORDARONE) 200 mg tablet, Take 200 mg by mouth daily. Instructed to take DOS per Anesthesia guidelines. Indications: VENTRICULAR RATE CONTROL IN ATRIAL FIBRILLATION, Disp: , Rfl:     aspirin (ASPIRIN) 325 mg tablet, Take 325 mg by mouth daily. Decrease to Aspirin 81mg 5 days prior to surgery per anesthesia guidelines. , Disp: , Rfl:    Date Last Reviewed:  4/16/2018   EXAMINATION:   Observation/Orthostatic Postural Assessment:          Minimal edema left knee - surgical scar well healed with no adhesins noted  Palpation:          Mild tenderness at insertion of left IT band  ROM:          Left knee full extension and 128 flexion  Strength:          Flexion - 5/5  Extension 4+/5 left knee   CLINICAL PRESENTATION:   CLINICAL DECISION MAKING:   Outcome Measure: Tool Used: Lower Extremity Functional Scale (LEFS)  Score:  Initial: 42/80  (Date: 3-12-18 ) Most Recent: 50/80 (Date:4-11-18 )   Interpretation of Score: 20 questions each scored on a 5 point scale with 0 representing \"extreme difficulty or unable to perform\" and 4 representing \"no difficulty\". The lower the score, the greater the functional disability. 80/80 represents no disability. Minimal detectable change is 9 points. Score 80 79-63 62-48 47-32 31-16 15-1 0   Modifier CH CI CJ CK CL CM CN     ? Mobility - Walking and Moving Around:     - CURRENT STATUS: CJ - 20%-39% impaired, limited or restricted    - GOAL STATUS: CI - 1%-19% impaired, limited or restricted    - D/C STATUS:  ---------------To be determined---------------    Medical Necessity:   · Patient demonstrates good rehab potential due to higher previous functional level. Reason for Services/Other Comments:  · Patient continues to require modification of therapeutic interventions to increase complexity of exercises.             TREATMENT:   (In addition to Assessment/Re-Assessment sessions the following treatments were rendered)  4/16/2018  Pre-treatment Symptoms/Complaints:  Pt reports being minimal knee pain over the weekend. Pain: Initial:   Pain Intensity 1: 0  Pain Location 1: Knee  Pain Orientation 1: Left  Post Session: 1/10     THERAPEUTIC EXERCISE: (45 minutes):  Exercises per grid below to improve mobility, strength and coordination. Required minimal verbal cues to promote proper body alignment and promote proper body mechanics. Progressed resistance, range, repetitions and complexity of movement as indicated. Access Code: [email or web address]   URL: [email or web address]   Date: 03/12/2018   Prepared by:  Donn Low     Exercises  Supine Quad Set - 10 reps - 5 hold - 2x daily  Supine Heel Slide - 10 reps - 2x daily  Small Range Straight Leg Raise - 10 reps - 2x daily  Supine Knee Extension Strengthening - 10 reps - 2x daily     Date:  3-19-18 Date:  3-21-18 Date:  3-26-18 Date:  3-28-18 Date:  4-3-18 Date:  4-4-18 Date:  4-9-18 Date:  4-11-18 Date:  4-16-18   Activity/Exercise Parameters Parameters Parameters Parameters  Parameters Parameters Parameters Parameters   HEP            Recumbent stepper Level 1 x 10' Level 1 x 10' Level 1 x 10' Level 1 x 10' Level 1 x 10' Level 1 x 10' Level 1 x 10' Level 1 x 10' Level 1 x 10'   Ant step ups X 10 BLE X 10 BLE X 10 BLE X 10 BLE X 10 BLE X 10 BLE X 10 BLE X 10 BLE X 10 BLE   Lat step ups X 10 BLE X 10 BLE X 10 BLE X 10 BLE X 10 BLE X 10 BLE X 10 BLE X 10 BLE X 10 BLE   Ant heel taps X 10 BLE X 10 BLE X 10 BLE X 10 BLE X 10 BLE X 10 BLE X 10 BLE X 10 BLE X 10 BLE   Incline calf 10 x 5\" 10 x 5\" 10 x 5\" 10 x 5\" 10 x 5\" 10 x 5\" 10 x 5\" 10 x 5\" 10 x 5\"   Wall squats X 10 X 10 X 10 X 10 X 10 X 10 X 10 X 10 X 10   QS X 20 X 20 X 20 X 20 X 20 X 20 X 20 X 20 X 20   SLR 2# x 10 2# x 10 3# x 10 3# x 10 3# x 10 3# x 10 4# x 10 4# x 10 4# x 10   SAQ 2# x 10 2# x 10 3# x 10 3# x 10 3# x 10 3# x 10 4# x 10 4# x 10 4# x 10   Standing hamstring curl 2# x 10 2# x 10 3# x 10 3# x 10 3# x 10 3# x 10 4# x 10 4# x 10 4# x 10   Standing hip abd 2# x 10 2# x 10 3# x 10 3# x 10 3# x 10 3# x 10 4# x 10 4# x 10 4# x 10   Standing hip flx 2# x 10 2# x 10 3# x 10 3# x 10 3# x 10 3# x 10 4# x 10 4# x 10 4# x 10   Backward walk  45' x 2 HHA 45' x 2 HHA 39' x 2 HHA 39' x 2 HHA 39' x 2 39' x 2  45' x 2   Side steps   45' x 2 HHA 39' x 2 HHA 39' x 2 HHA 39' x 2 39' x 2  45' x 2       Treatment/Session Assessment:    · Response to Treatment:  Probable D/C next visit. · Compliance with Program/Exercises: Will assess as treatment progresses. · Recommendations/Intent for next treatment session: \"Next visit will focus on advancements to more challenging activities\". Future Appointments  Date Time Provider Sonia Martell   4/18/2018 9:15 AM Bernadette Painting PT [facility]     Please explain any variance from Plan of Care.   Total Treatment Duration:  PT Patient Time In/Time Out  Time In: 0915  Time Out: [address] GALE Low

## 2018-04-18 ENCOUNTER — HOSPITAL ENCOUNTER (OUTPATIENT)
Dept: PHYSICAL THERAPY | Age: 78
Discharge: HOME | End: 2018-04-18
Payer: MEDICARE

## 2018-04-18 PROCEDURE — G8980 MOBILITY D/C STATUS: HCPCS

## 2018-04-18 PROCEDURE — 97110 THERAPEUTIC EXERCISES: CPT

## 2018-04-18 PROCEDURE — G8979 MOBILITY GOAL STATUS: HCPCS

## 2018-04-18 NOTE — PROGRESS NOTES
Filippo Jaimes  : 1940  [place]  Secondary: Bshsi Generic [address] [facility]  at [facility], [address], [place] [address]  [phone]   [phone]        OUTPATIENT PHYSICAL THERAPY:Daily Note and Discharge 2018    ICD-10: Treatment Diagnosis:   Pain in left knee (M25.562)   Presence of left artificial knee joint (R35.991)   Precautions/Allergies:   Sulfa (sulfonamide antibiotics) and Tape [adhesive]   Fall Risk Score: 5 (? 5 = High Risk)  MD Orders: Eval and Treat MEDICAL/REFERRING DIAGNOSIS:  Low back pain [M54.5]  Other specified enthesopathies of left lower limb, excluding foot [M76.892]  Presence of left artificial knee joint [Z96.652]   DATE OF ONSET: 2015  REFERRING PHYSICIAN: Sadie Rivera MD  RETURN PHYSICIAN APPOINTMENT: not scheduled   ATTENDANCE: As of 18, Filippo Jaimes has attended 12 out of 12 scheduled visits, with 0 cancellation(s) and 0 no shows. ASSESSMENT:  Ms. Alia Angeles reports significant improvement in pain in left knee Pt reports she now has minimal difficulty with stairs or sit to stand. She demonstrates good strength and ROM. She is independent in her HEP. Her LEFS score improved from 42/80 to 61/80. As patient has reached all goals, we will discharge from active therapy at this time. PROBLEM LIST (Impacting functional limitations):  1. Decreased Strength  2. Decreased ADL/Functional Activities  3. Increased Pain INTERVENTIONS PLANNED:  1. Gait Training  2. Home Exercise Program (HEP)  3. Manual Therapy  4. Therapeutic Exercise/Strengthening   TREATMENT PLAN:  Effective Dates: 3-12-18 TO 18. GOALS: (Goals have been discussed and agreed upon with patient.)  Short-Term Functional Goals: Time Frame: 4 weeks  1. Independent in initial HEP Goal Met 18  2.  Decrease pain to < 3/10 with stair ascent and descent  Goal Met 18  Discharge Goals: Time Frame: 6 weeks  1. Independent in advanced HEP Goal Met 18  2. 5/5 strength left quads Goal Met 18  3. Minimal pain with stair climbing Goal Met 18  Rehabilitation Potential For Stated Goals: Good  Regarding Doylene Copper therapy, I certify that the treatment plan above will be carried out by a therapist or under their direction. Thank you for this referral,  Janel Grover, PT                 The information in this section was collected on 3-12-18 (except where otherwise noted). HISTORY:   History of Present Injury/Illness (Reason for Referral):  Pt had left TKA 2015. She completed short round of PT with minimal pain and was discharged to [facility]. Pt reports pain continued mainly with stairs and sit to stand. She reports tripping in her yard in 2018. She was concerned about damage to TKA. X-rays were negative. Pt admits pain is now about the same as before the fall. Past Medical History/Comorbidities:   Ms. Hollie Nino  has a past medical history of Atrial septal aneurysm; CAD (coronary artery disease); Dyslipidemia; History of breast cancer (); Hypertension; Osteoarthritis; Paroxysmal atrial fibrillation ([facility] [address] Patent foramen ovale; and Valvular heart disease. Ms. Hollie Nino  has a past surgical history that includes hx mastectomy (Left, ); hx hysterectomy (); hx  section; hx cholecystectomy (); hx mohs procedure (Left, ); hx cataract removal (Bilateral); and hx back surgery. Left TKA 2015.   Social History/Living Environment:     lives alone in single story home with walk in shower  Prior Level of Function/Work/Activity:  independent  Dominant Side:         LEFT  Current Medications:       Current Outpatient Prescriptions:     calcium-cholecalciferol, d3, 600-125 mg-unit tab, Take  by mouth., Disp: , Rfl:     fenofibrate (TRICOR) 160 mg tablet, Take 160 mg by mouth nightly., Disp: , Rfl:     rosuvastatin (CRESTOR) 10 mg tablet, Take 10 mg by mouth nightly., Disp: , Rfl:     benazepril (LOTENSIN) 20 mg tablet, Take 20 mg by mouth daily. Indications: HYPERTENSION, Disp: , Rfl:     amiodarone (CORDARONE) 200 mg tablet, Take 200 mg by mouth daily. Instructed to take DOS per Anesthesia guidelines. Indications: VENTRICULAR RATE CONTROL IN ATRIAL FIBRILLATION, Disp: , Rfl:     aspirin (ASPIRIN) 325 mg tablet, Take 325 mg by mouth daily. Decrease to Aspirin 81mg 5 days prior to surgery per anesthesia guidelines. , Disp: , Rfl:    Date Last Reviewed:  4/18/2018   EXAMINATION:   Observation/Orthostatic Postural Assessment:          Minimal edema left knee - surgical scar well healed with no adhesins noted  Palpation:          Mild tenderness at insertion of left IT band - 4-18-18 - minimal  ROM:          Left knee full extension and 128 flexion  Strength:          Flexion - 5/5  Extension 4+/5 left knee  4-18-18 - 5/5   CLINICAL PRESENTATION:   CLINICAL DECISION MAKING:   Outcome Measure: Tool Used: Lower Extremity Functional Scale (LEFS)  Score:  Initial: 42/80  (Date: 3-12-18 ) Most Recent: 61/80 (Date:4-18-18 )   Interpretation of Score: 20 questions each scored on a 5 point scale with 0 representing \"extreme difficulty or unable to perform\" and 4 representing \"no difficulty\". The lower the score, the greater the functional disability. 80/80 represents no disability. Minimal detectable change is 9 points. Score 80 79-63 62-48 47-32 31-16 15-1 0   Modifier CH CI CJ CK CL CM CN     ? Mobility - Walking and Moving Around:     - CURRENT STATUS:     - GOAL STATUS: CI - 1%-19% impaired, limited or restricted    - D/C STATUS:  CJ - 20%-39% impaired, limited or restricted                TREATMENT:   (In addition to Assessment/Re-Assessment sessions the following treatments were rendered)  4/18/2018  Pre-treatment Symptoms/Complaints:  Pt reports being minimal knee pain now with stairs. Pt denies any significant limitations in ADL's. .  Pain: Initial: Pain Intensity 1: 0  Pain Location 1: Knee  Pain Orientation 1: Left  Post Session: 0/10     THERAPEUTIC EXERCISE: (45 minutes):  Exercises per grid below to improve mobility, strength and coordination. Required minimal verbal cues to promote proper body alignment and promote proper body mechanics. Progressed resistance, range, repetitions and complexity of movement as indicated. Access Code: [email or web address]   URL: [email or web address]   Date: 03/12/2018   Prepared by:  Donn Low     Exercises  Supine Quad Set - 10 reps - 5 hold - 2x daily  Supine Heel Slide - 10 reps - 2x daily  Small Range Straight Leg Raise - 10 reps - 2x daily  Supine Knee Extension Strengthening - 10 reps - 2x daily     Date:  3-21-18 Date:  3-26-18 Date:  3-28-18 Date:  4-3-18 Date:  4-4-18 Date:  4-9-18 Date:  4-11-18 Date:  4-16-18 Date:  4-18-18   Activity/Exercise Parameters Parameters Parameters  Parameters Parameters Parameters Parameters Parameters   HEP            Recumbent stepper Level 1 x 10' Level 1 x 10' Level 1 x 10' Level 1 x 10' Level 1 x 10' Level 1 x 10' Level 1 x 10' Level 1 x 10' Level 1 x 10'   Ant step ups X 10 BLE X 10 BLE X 10 BLE X 10 BLE X 10 BLE X 10 BLE X 10 BLE X 10 BLE X 10 BLE   Lat step ups X 10 BLE X 10 BLE X 10 BLE X 10 BLE X 10 BLE X 10 BLE X 10 BLE X 10 BLE X 10 BLE   Ant heel taps X 10 BLE X 10 BLE X 10 BLE X 10 BLE X 10 BLE X 10 BLE X 10 BLE X 10 BLE X 10 BLE   Incline calf 10 x 5\" 10 x 5\" 10 x 5\" 10 x 5\" 10 x 5\" 10 x 5\" 10 x 5\" 10 x 5\" 10 x 5\"   Wall squats X 10 X 10 X 10 X 10 X 10 X 10 X 10 X 10 X 10   QS X 20 X 20 X 20 X 20 X 20 X 20 X 20 X 20 X 20   SLR 2# x 10 3# x 10 3# x 10 3# x 10 3# x 10 4# x 10 4# x 10 4# x 10 4# x 10   SAQ 2# x 10 3# x 10 3# x 10 3# x 10 3# x 10 4# x 10 4# x 10 4# x 10 4# x 10   Standing hamstring curl 2# x 10 3# x 10 3# x 10 3# x 10 3# x 10 4# x 10 4# x 10 4# x 10 4# x 10   Standing hip abd 2# x 10 3# x 10 3# x 10 3# x 10 3# x 10 4# x 10 4# x 10 4# x 10 4# x 10 Standing hip flx 2# x 10 3# x 10 3# x 10 3# x 10 3# x 10 4# x 10 4# x 10 4# x 10 4# x 10   Backward walk 45' x 2 HHA 45' x 2 HHA 45' x 2 HHA 45' x 2 HHA 45' x 2 45' x 2  45' x 2    Side steps  45' x 2 HHA 45' x 2 HHA 45' x 2 HHA 45' x 2 39' x 2  45' x 2        Treatment/Session Assessment:    · Response to Treatment:  All goals met. · Compliance with Program/Exercises: Always. · Recommendations/Intent for next treatment session: As patient has reached all goals, we will discharge from active therapy at this time. No future appointments. Please explain any variance from Plan of Care.   Total Treatment Duration:  PT Patient Time In/Time Out  Time In: 0915  Time Out: [address] G[place] Low

## 2018-08-06 ENCOUNTER — HOSPITAL ENCOUNTER (OUTPATIENT)
Dept: SURGERY | Age: 78
Discharge: HOME | End: 2018-08-06
Attending: ORTHOPAEDIC SURGERY
Payer: MEDICARE

## 2018-08-06 ENCOUNTER — HOSPITAL ENCOUNTER (OUTPATIENT)
Dept: PHYSICAL THERAPY | Age: 78
Discharge: HOME | End: 2018-08-06
Attending: ORTHOPAEDIC SURGERY
Payer: MEDICARE

## 2018-08-06 VITALS
WEIGHT: 174.3 LBS | HEART RATE: 50 BPM | OXYGEN SATURATION: 100 % | BODY MASS INDEX: 29.04 KG/M2 | RESPIRATION RATE: 16 BRPM | DIASTOLIC BLOOD PRESSURE: 78 MMHG | TEMPERATURE: 95.9 F | SYSTOLIC BLOOD PRESSURE: 144 MMHG | HEIGHT: 65 IN

## 2018-08-06 VITALS — OXYGEN SATURATION: 99 %

## 2018-08-06 LAB
ANION GAP SERPL CALC-SCNC: 11 MMOL/L (ref 7–16)
APPEARANCE UR: CLEAR
APTT PPP: 45.8 SEC (ref 23.2–35.3)
BACTERIA SPEC CULT: ABNORMAL
BACTERIA URNS QL MICRO: 0 /HPF
BILIRUB UR QL: NEGATIVE
BUN SERPL-MCNC: 22 MG/DL (ref 8–23)
CALCIUM SERPL-MCNC: 9.4 MG/DL (ref 8.3–10.4)
CASTS URNS QL MICRO: 0 /LPF
CHLORIDE SERPL-SCNC: 102 MMOL/L (ref 98–107)
CO2 SERPL-SCNC: 27 MMOL/L (ref 21–32)
COLOR UR: YELLOW
CREAT SERPL-MCNC: 0.92 MG/DL (ref 0.6–1)
EPI CELLS #/AREA URNS HPF: ABNORMAL /HPF
ERYTHROCYTE [DISTWIDTH] IN BLOOD BY AUTOMATED COUNT: 13.7 % (ref 11.9–14.6)
GLUCOSE SERPL-MCNC: 83 MG/DL (ref 65–100)
GLUCOSE UR STRIP.AUTO-MCNC: NEGATIVE MG/DL
HCT VFR BLD AUTO: 42 % (ref 35.8–46.3)
HGB BLD-MCNC: 13.5 G/DL (ref 11.7–15.4)
HGB UR QL STRIP: NEGATIVE
INR PPP: 1.3
KETONES UR QL STRIP.AUTO: NEGATIVE MG/DL
LEUKOCYTE ESTERASE UR QL STRIP.AUTO: ABNORMAL
MCH RBC QN AUTO: 31.2 PG (ref 26.1–32.9)
MCHC RBC AUTO-ENTMCNC: 32.1 G/DL (ref 31.4–35)
MCV RBC AUTO: 97 FL (ref 79.6–97.8)
NITRITE UR QL STRIP.AUTO: NEGATIVE
PH UR STRIP: 7.5 [PH] (ref 5–9)
PLATELET # BLD AUTO: 314 K/UL (ref 150–450)
PMV BLD AUTO: 10.8 FL (ref 10.8–14.1)
POTASSIUM SERPL-SCNC: 4 MMOL/L (ref 3.5–5.1)
PROT UR STRIP-MCNC: NEGATIVE MG/DL
PROTHROMBIN TIME: 16.3 SEC (ref 11.5–14.5)
RBC # BLD AUTO: 4.33 M/UL (ref 4.05–5.25)
RBC #/AREA URNS HPF: 0 /HPF
SERVICE CMNT-IMP: ABNORMAL
SODIUM SERPL-SCNC: 140 MMOL/L (ref 136–145)
SP GR UR REFRACTOMETRY: 1.01 (ref 1–1.02)
UROBILINOGEN UR QL STRIP.AUTO: 0.2 EU/DL (ref 0.2–1)
WBC # BLD AUTO: 6.8 K/UL (ref 4.3–11.1)
WBC URNS QL MICRO: ABNORMAL /HPF

## 2018-08-06 PROCEDURE — 97161 PT EVAL LOW COMPLEX 20 MIN: CPT

## 2018-08-06 PROCEDURE — 87641 MR-STAPH DNA AMP PROBE: CPT

## 2018-08-06 PROCEDURE — 85610 PROTHROMBIN TIME: CPT

## 2018-08-06 PROCEDURE — 36415 COLL VENOUS BLD VENIPUNCTURE: CPT

## 2018-08-06 PROCEDURE — G8980 MOBILITY D/C STATUS: HCPCS

## 2018-08-06 PROCEDURE — 77030027138 HC INCENT SPIROMETER -A

## 2018-08-06 PROCEDURE — 81001 URINALYSIS AUTO W/SCOPE: CPT

## 2018-08-06 PROCEDURE — 85730 THROMBOPLASTIN TIME PARTIAL: CPT

## 2018-08-06 PROCEDURE — 85027 COMPLETE CBC AUTOMATED: CPT

## 2018-08-06 PROCEDURE — 80048 BASIC METABOLIC PNL TOTAL CA: CPT

## 2018-08-06 PROCEDURE — G8978 MOBILITY CURRENT STATUS: HCPCS

## 2018-08-06 PROCEDURE — G8979 MOBILITY GOAL STATUS: HCPCS

## 2018-08-06 RX ORDER — BISMUTH SUBSALICYLATE 262 MG
1 TABLET,CHEWABLE ORAL DAILY
COMMUNITY

## 2018-08-06 RX ORDER — ACETAMINOPHEN 500 MG
500 TABLET ORAL
COMMUNITY

## 2018-08-06 RX ORDER — ZINC GLUCONATE 10 MG
1 LOZENGE ORAL
COMMUNITY

## 2018-08-06 NOTE — PROGRESS NOTES
Diane Bryson  : (25 y.o.) [facility] at [address], [place] [address].  [phone]       Physical Therapy Prehab Plan of Treatment and Evaluation Summary:2018    ICD-10: Treatment Diagnosis:   · Pain in Right Knee (M25.561)  · Stiffness of Right Knee, Not elsewhere classified (M25.661)  Precautions/Allergies:   Sulfa (sulfonamide antibiotics) and Tape [adhesive]  MEDICAL/REFERRING DIAGNOSIS:  Unilateral primary osteoarthritis, right knee [M17.11]  REFERRING PHYSICIAN: Shaheen Estrada MD  DATE OF SURGERY: 18   Assessment:   Comments:  Scheduled for R TKA. Had L TKA in . Ambulates with rolling walker. Lives alone and is planning to go to [facility] at rehab. PROBLEM LIST (Impacting functional limitations):  Ms. Hakeem Valenzuela presents with the following right lower extremity(s) problems:  1. Transfers  2. Gait  3. Strength  4. Range of Motion  5. Home Exercise Program  6. Pain   INTERVENTIONS PLANNED:  1. Home Exercise Program  2. Educational Discussion     TREATMENT PLAN: Effective Dates: 2018 TO 2018. Frequency/Duration: Patient to continue to perform home exercise program at least twice per day up until her surgery. GOALS: (Goals have been discussed and agreed upon with patient.)  Discharge Goals: Time Frame: 1 Day  1. Patient will demonstrate independence with a home exercise program designed to increase functional technique and pain control to minimize functional deficits and optimize patient for total joint replacement. Rehabilitation Potential For Stated Goals: Good  Regarding Rakesh Mann therapy, I certify that the treatment plan above will be carried out by a therapist or under their direction.   Thank you for this referral,  Ally Saleh, PT               HISTORY:   Present Symptoms:  Pain Intensity 1: 6  Pain Location 1: Knee  Pain Orientation 1: Right   History of Present Injury/Illness (Reason for Referral):  Medical/Referring Diagnosis: Unilateral primary osteoarthritis, right knee [M17.11]   Past Medical History/Comorbidities:   Ms. Hakeem Valenzuela  has a past medical history of Atrial septal aneurysm; CAD (coronary artery disease); Dyslipidemia; Heart murmur; History of breast cancer (); Hypertension; Osteoarthritis; Paroxysmal atrial fibrillation ([address] Patent foramen ovale; and Valvular heart disease. She also has no past medical history of Adverse effect of anesthesia; Asthma; Autoimmune disease ([address] Chronic kidney disease; Chronic obstructive pulmonary disease ([address] Chronic pain; Coagulation disorder ([address] Diabetes ([address] Difficult intubation; GERD (gastroesophageal reflux disease); Heart failure ([address] Ill-defined condition; Liver disease; Malignant hyperthermia due to anesthesia; Morbid obesity ([address] Nausea & vomiting; Pseudocholinesterase deficiency; Psychiatric disorder; PUD (peptic ulcer disease); Seizures ([address] Stroke [facility]); Thromboembolus ([address] Thyroid disease; Unspecified adverse effect of anesthesia; or Unspecified sleep apnea. Ms. Hakeem Valenzuela  has a past surgical history that includes hx mastectomy (Left, ); hx hysterectomy (); hx  section; hx cholecystectomy (); hx cataract removal (Bilateral); hx back surgery; hx rotator cuff repair (Left, ); and hx other surgical ().   Social History/Living Environment:   Home Environment: Private residence  # Steps to Enter: 0  One/Two Story Residence: One story  Living Alone: Yes  Support Systems: Friends \ neighbors  Patient Expects to be Discharged to[de-identified] Rehabilitation facility ([facility])  Current DME Used/Available at Home: Walker, rolling, Cane, straight, Raised toilet seat  Tub or Shower Type: Shower  Work/Activity:  retired  Dominant Side:  LEFT  Current Medications:  See Pre-assessment nursing note   Number of Personal Factors/Comorbidities that affect the Plan of Care: 1-2: MODERATE COMPLEXITY   EXAMINATION:   ADLs (Current Functional Status):   Ambulation:  [] Independent  [] Walk Indoors Only  [] Walk Outdoors  [x] Use Assistive Device  [] Use Wheelchair Only Dressing:  [x] Independent  Requires Assistance from Someone for:  [] Sock/Shoes  [] Pants  [] Everything   Bathing/Showering:   [x] Independent  [] Requires Assistance from Someone  [] Sponge Bath Only Household Activities:  [] Routine house and yard work  [x] Light Housework Only  [] None   Observation/Orthostatic Postural Assessment: Forward head, Rounded shoulders  ROM/Flexibility:   AROM: Generally decreased, functional                       RLE AROM  R Knee Flexion: 100  R Knee Extension: 0   Strength:   Strength: Generally decreased, functional                  Functional Mobility:         Stand to Sit: Modified independent, Additional time  Sit to Stand: Modified independent, Additional time  Distance (ft): 500 Feet (ft)  Ambulation - Level of Assistance: Modified independent; Additional time  Assistive Device: Walker, rolling  Speed/Ariela: Slow  Stance: Right decreased  Gait Abnormalities: Antalgic;Decreased step clearance          Balance:    Sitting: Intact  Standing: With support   Body Structures Involved:  1. Bones  2. Joints  3. Muscles Body Functions Affected:  1. Neuromusculoskeletal  2. Movement Related Activities and Participation Affected:  1. General Tasks and Demands  2. Mobility  3. Self Care   Number of elements that affect the Plan of Care: 3: MODERATE COMPLEXITY   CLINICAL PRESENTATION:   Presentation: Stable and uncomplicated: LOW COMPLEXITY   CLINICAL DECISION MAKING:   Outcome Measure: Tool Used: Lower Extremity Functional Scale (LEFS)  Score:  Initial: 20/80 Most Recent: X/80 (Date: -- )   Interpretation of Score: 20 questions each scored on a 5 point scale with 0 representing \"extreme difficulty or unable to perform\" and 4 representing \"no difficulty\". The lower the score, the greater the functional disability.  80/80 represents no disability. Minimal detectable change is 9 points. Score 80 79-65 64-49 48-33 32-17 16-1 0   Modifier CH CI CJ CK CL CM CN     ? Mobility - Walking and Moving Around:     - CURRENT STATUS: CL - 60%-79% impaired, limited or restricted    - GOAL STATUS: CL - 60%-79% impaired, limited or restricted    - D/C STATUS:  CL - 60%-79% impaired, limited or restricted  Medical Necessity:   · Ms. John Paul Middleton is expected to optimize her lower extremity strength and ROM in preparation for joint replacement surgery. Reason for Services/Other Comments:  · Achieve baseline assesment of musculoskeletal system, functional mobility and home environment. , educate in PT HEP in preparation for surgery, educate in hospital plan of care. Use of outcome tool(s) and clinical judgement create a POC that gives a: Clear prediction of patient's progress: LOW COMPLEXITY   TREATMENT:   Treatment/Session Assessment:  Patient was instructed in PT- HEP to increase strength and ROM in LEs. Answered all questions. · Post session pain:  6  · Compliance with Program/Exercises: anticipate compliance.   Total Treatment Duration:  PT Patient Time In/Time Out  Time In: 1245  Time Out: [place] 2799

## 2018-08-06 NOTE — PROGRESS NOTES
08/06/18 1200   Oxygen Therapy   O2 Sat (%) 99 %   Pulse via Oximetry 55 beats per minute   O2 Device Room air   Pre-Treatment   Breathing Pattern Regular   Cough Non-productive   Breath Sounds Bilateral Clear   Respirations 16   Pre FEV1 (liters) 1.8 liters   % Predicted 87   Incentive Spirometry Treatment   Actual Volume (ml) 1500 ml     Initial respiratory Assessment completed with pt. Pt was interviewed and evaluated in Joint camp prior to surgery. Patient ID:  Kelton Saha  [id number]  78 y.o.  1940  Surgeon: Dr. Ryan Fuelling  Date of Surgery: The linked surgery was not found. Please check manually. Procedure: Total Right Knee Arthroplasty  Primary Care Physician: Johnna Sahu [phone]  Specialists:                                  Pt instructed in the use of Incentive Spirometry. Pt instructed to bring Incentive Spirometer back on date of surgery & to start using Is upon return to pt room.     Pt taught proper cough technique    History of smoking:   NONE                                                       Quit date:           Secondhand smoke: NONE      Past procedures with Oxygen desaturation: NONE    Past Medical History:   Diagnosis Date    Atrial septal aneurysm     per cardiac note 3/15    CAD (coronary artery disease)     per cardiac note 3/15: non-obstructive by cath 2001    Dyslipidemia     managed with medication    Heart murmur     Echo 7/13/18, LVEF- 55-65%, mild aortic stenosis     History of breast cancer 1990    left; s/p mastectomy    Hypertension     managed with medication    Osteoarthritis     Paroxysmal atrial fibrillation ([address]     managed with medication    Patent foramen ovale     per cardiac note 3/15    Valvular heart disease     per cardiac note 3/15: mild MR                                                                                                                                                     Respiratory history:DENIES SOB Respiratory meds:  NONE                                       FAMILY PRESENT:            SPOUSE,                                                                                          PAST SLEEP STUDY:                            NO  HX OF PELON:                                            NO                                     PELON assessment:                                               SLEEPS ON SIDE    AND   BACK                                                               PHYSICAL EXAM   There is no height or weight on file to calculate BMI.    Visit Vitals    SpO2 99%     Neck circumference:   35   cm    Loud snoring:        YES                         Witnessed apnea or wakening gasping or choking:,          WAKES SELF                                                                                                      Awakens with headaches:                                                  DENIES    Morning or daytime tiredness/ sleepiness:                    DENIES                                                                                           Dry mouth or sore throat in morning:          SOME      Wang stage: 2      SACS score:    STOP/BANG: 3                              CPAP:                       NONE                                         NONE  Referrals:    Pt. Phone Number:

## 2018-08-06 NOTE — PERIOP NOTES
PT/PTT abnormal- will have anesthesia review results. All other lab results within anesthesia guidelines.  Lab results sent to PCP per surgeon's request.       Recent Results (from the past 12 hour(s))   CBC W/O DIFF    Collection Time: 08/06/18 12:22 PM   Result Value Ref Range    WBC 6.8 4.3 - 11.1 K/uL    RBC 4.33 4.05 - 5.25 M/uL    HGB 13.5 11.7 - 15.4 g/dL    HCT 42.0 35.8 - 46.3 %    MCV 97.0 79.6 - 97.8 FL    MCH 31.2 26.1 - 32.9 PG    MCHC 32.1 31.4 - 35.0 g/dL    RDW 13.7 11.9 - 14.6 %    PLATELET 656 621 - 814 K/uL    MPV 10.8 10.8 - 09.5 FL   METABOLIC PANEL, BASIC    Collection Time: 08/06/18 12:22 PM   Result Value Ref Range    Sodium 140 136 - 145 mmol/L    Potassium 4.0 3.5 - 5.1 mmol/L    Chloride 102 98 - 107 mmol/L    CO2 27 21 - 32 mmol/L    Anion gap 11 7 - 16 mmol/L    Glucose 83 65 - 100 mg/dL    BUN 22 8 - 23 MG/DL    Creatinine 0.92 0.6 - 1.0 MG/DL    GFR est AA >60 >60 ml/min/1.73m2    GFR est non-AA >60 >60 ml/min/1.73m2    Calcium 9.4 8.3 - 10.4 MG/DL   PROTHROMBIN TIME + INR    Collection Time: 08/06/18 12:22 PM   Result Value Ref Range    Prothrombin time 16.3 (H) 11.5 - 14.5 sec    INR 1.3     PTT    Collection Time: 08/06/18 12:22 PM   Result Value Ref Range    aPTT 45.8 (H) 23.2 - 35.3 SEC   URINALYSIS W/ RFLX MICROSCOPIC    Collection Time: 08/06/18 12:22 PM   Result Value Ref Range    Color YELLOW      Appearance CLEAR      Specific gravity 1.011 1.001 - 1.023      pH (UA) 7.5 5.0 - 9.0      Protein NEGATIVE  NEG mg/dL    Glucose NEGATIVE  mg/dL    Ketone NEGATIVE  NEG mg/dL    Bilirubin NEGATIVE  NEG      Blood NEGATIVE  NEG      Urobilinogen 0.2 0.2 - 1.0 EU/dL    Nitrites NEGATIVE  NEG      Leukocyte Esterase TRACE (A) NEG      WBC 0-3 0 /hpf    RBC 0 0 /hpf    Epithelial cells 0-3 0 /hpf    Bacteria 0 0 /hpf    Casts 0 0 /lpf

## 2018-08-06 NOTE — PERIOP NOTES
Echocardiogram Complete (w/wo Contrast or Bubble Study)7/13/2018  [address] [employer]  Component Name Value Ref Range   LA volume 69.1 cm3   LA dimension (2D) 3.8 cm   LA Volume Index 37.2 (A) 16 - 34 ml/m2   AV mean gradient 10.0 mmHg    Ao VTI 59.00 cm   Ao Vmax 229.0 cm/s   AV peak gradient 21.0 mmHg    PAUL (continuity VTI) 1.4 cm2   PAUL (continuity Vmax) 1.5 cm2   Ao root diameter 2.70 cm   IVS (2D) 1.10 (A) cm   LVIDD (2D) 4.80 cm   LVIDS (2D) 3.40 cm   LVOT diameter 1.90 cm   LVOT Mean Gradient 3.0 mmHg    LVOT Vmax 122.0 cm/s   LVOT peak gradient 6.0 mmHg    LVPWd (2D) 1.00 (A) cm   MV E/e' Lateral Ratio 9.4     MV E/e' Septal Ratio 10.8     LVOT area 2.8 cm2   MV deceleration time 229.0 (A) 160 - 200 ms   MV Peak A Valeriano 84.9 cm/s   MV Peak E Valeriano 84.9 cm/s   E/A ratio 1.0 0.5 - 1.5    PV Peak Velocity 100.0 cm/s   PV peak gradient 4.0 mmHg    RV R/L Diam 4.6 cm   RV Free Wall Peak S' 12.7 cm/s   TR Vmax 297.0 cm/s   TR Peak Gradient 35.0 mmHg    IVC 2.00 cm   Est. RA Pressure 3.0 mmHg    Pulmonary Artery Mean Presure 16.0 mmHg    RVSP 38.0 mmHg    Result Narrative   · The left ventricular systolic function is normal (55-65%). · Indeterminate left ventricular diastolic function. · The left atrium is dilated. · The right atrium is dilated. · Possible small PFO with mild left to right shunt noted  · Mild aortic valve stenosis. · Mild mitral valve regurgitation. · Moderate tricuspid valve regurgitation.

## 2018-08-06 NOTE — PERIOP NOTES
Patient verified name, , and surgery as listed in [facility]. Type 3 surgery, PAT Joint assessment complete. Labs per surgeon: cbc, bmp, UA, PTT, PT, MRSA nasal swab; results pending. Labs per anesthesia protocol: no additional labs needed. EKG:Done by [facility] on 18- will request records. Last cardiology office note dated 18, Eliquis medication hold note dated 18, Echo report dated 18 found in care everywhere and cardiac clearance note dated 18 printed and placed on chart. Will have anesthesia review Echo report due to mild aortic stenosis. Last stress test and EKG requested from [facility]. First B/p reading elevated at PAT visit. Pt stated that she has high b/p readings with the automatic b/p cuffs- manual b/p cuff used and B/p was within normal limits. Hibiclens and instructions to return bottle on DOS given per hospital policy. Nasal Swab collected per MD order and instructions for Mupirocin nasal ointment if required. Patient provided with handouts including Guide to Surgery, Pain Management, Hand Hygiene, Blood Transfusion Education, and [facility] Brochure. Patient answered medical/surgical history questions at their best of ability. All prior to admission medications documented in [facility]. Original medication prescription bottle not visualized during patient appointment. Patient instructed to hold all vitamins 7 days prior to surgery and NSAIDS 5 days prior to surgery. Medications to be held: eliquis, vitamins and supplements. Patient instructed to continue previous medications as prescribed prior to surgery and to take the following medications the day of surgery according to anesthesia guidelines with a small sip of water: amiodarone. Patient teach back successful and patient demonstrates knowledge of instruction.

## 2018-08-07 NOTE — PERIOP NOTES
Prescription for Mupirocin ointment 22g tube, apply intranasally to both nostrils BID x5 days pre-operatively or for a total of 10 doses; called to [facility] at [phone]. Patient notified of positive MrSA nasal swab, verbalizes understanding of usage starting on 8/18/18.    8/6/2018  9:41 PM - Sunil, Lab In [employer]   Component Results   Component Value Flag Ref Range Units Status   Special Requests: NO SPECIAL REQUESTS     Final   Culture result:  (A)    Final   MRSA target DNA detected, SA target DNA detected.       A positive test result does not necessarily indicate the presence of viable organisms.  It is however, presumptive for the presence of MRSA or SA.

## 2018-08-08 NOTE — PERIOP NOTES
Anesthesia (Dr. Javy Dooley ) reviewed chart. Verbal order received to recheck PT/INR DOS. Sign/held order placed in EMR for PT/INR DOS.

## 2018-08-09 NOTE — ADVANCED PRACTICE NURSE
Total Joint Surgery Preoperative Chart Review      Patient ID:  Ayo Zuniga  [id number]  78 y.o.  1940  Surgeon: Dr. Dior Ruggiero  Date of Surgery: 2018  Procedure: Total Right Knee Arthroplasty  Primary Care Physician: Jackson Reeves [phone]  Specialty Physician(s):      Subjective:   Ayo Zuniga is a 78 y.o. WHITE OR  female who presents for preoperative evaluation for Total Right Knee arthroplasty. This is a preoperative chart review note based on data collected by the nurse at the surgical Pre-Assessment visit. Past Medical History:   Diagnosis Date    Atrial septal aneurysm     per cardiac note 3/15    CAD (coronary artery disease)     per cardiac note 3/15: non-obstructive by cath     Dyslipidemia     managed with medication    Heart murmur     Echo 18, LVEF- 55-65%, mild aortic stenosis     History of breast cancer     left; s/p mastectomy    Hypertension     managed with medication    Osteoarthritis     Paroxysmal atrial fibrillation ([address]     managed with medication    Patent foramen ovale     per cardiac note 3/15    Valvular heart disease     per cardiac note 3/15: mild MR      Past Surgical History:   Procedure Laterality Date    HX BACK SURGERY      X3 lower back sx; no hardware    HX CATARACT REMOVAL Bilateral     HX  SECTION      x2    HX CHOLECYSTECTOMY  2014    HX HYSTERECTOMY  1979    HX MASTECTOMY Left     HX OTHER SURGICAL  2016    kari-anal abcess surgery     HX ROTATOR CUFF REPAIR Left      Family History   Problem Relation Age of Onset    Stroke Mother     Stroke Father       Social History   Substance Use Topics    Smoking status: Never Smoker    Smokeless tobacco: Never Used    Alcohol use No       Prior to Admission medications    Medication Sig Start Date End Date Taking? Authorizing Provider   mupirocin calcium (BACTROBAN) 2 % nasal ointment by Both Nostrils route two (2) times a day.    Yes Historical Provider   apixaban (ELIQUIS) 5 mg tablet Take 5 mg by mouth two (2) times a day. Yes Historical Provider   magnesium 250 mg tab Take 1 Tab by mouth nightly. Yes Historical Provider   multivitamin (ONE A DAY) tablet Take 1 Tab by mouth daily. Yes Historical Provider   acetaminophen (TYLENOL EXTRA STRENGTH) 500 mg tablet Take 500 mg by mouth every six (6) hours as needed for Pain. Yes Historical Provider   calcium-cholecalciferol, d3, 600-125 mg-unit tab Take 1 Tab by mouth daily. Yes Phys MD Rula   fenofibrate (TRICOR) 160 mg tablet Take 160 mg by mouth nightly. Yes Historical Provider   rosuvastatin (CRESTOR) 10 mg tablet Take 10 mg by mouth nightly. Yes Historical Provider   benazepril (LOTENSIN) 20 mg tablet Take 20 mg by mouth daily. Indications: HYPERTENSION   Yes Historical Provider   amiodarone (CORDARONE) 200 mg tablet Take 200 mg by mouth daily. Instructed to take DOS per Anesthesia guidelines. Indications: VENTRICULAR RATE CONTROL IN ATRIAL FIBRILLATION   Yes Historical Provider     Allergies   Allergen Reactions    Sulfa (Sulfonamide Antibiotics) Rash    Tape [Adhesive] Contact Dermatitis          Objective:     Physical Exam:       ECG:    EKG Results     None          Data Review:   Labs:   Results for Pari Lugo (MRN [id number]) as of 8/9/2018 13:13   Ref.  Range 8/6/2018 12:22   Sodium Latest Ref Range: 136 - 145 mmol/L 140   Potassium Latest Ref Range: 3.5 - 5.1 mmol/L 4.0   Chloride Latest Ref Range: 98 - 107 mmol/L 102   CO2 Latest Ref Range: 21 - 32 mmol/L 27   Anion gap Latest Ref Range: 7 - 16 mmol/L 11   Glucose Latest Ref Range: 65 - 100 mg/dL 83   BUN Latest Ref Range: 8 - 23 MG/DL 22   Creatinine Latest Ref Range: 0.6 - 1.0 MG/DL 0.92   Calcium Latest Ref Range: 8.3 - 10.4 MG/DL 9.4   GFR est non-AA Latest Ref Range: >60 ml/min/1.73m2 >60   GFR est AA Latest Ref Range: >60 ml/min/1.73m2 >60         Problem List:  )  Patient Active Problem List   Diagnosis Code    HTN (hypertension) I10    Personal history of breast cancer Z85.3    Hyperlipidemia E78.5    Atrial fibrillation ([facility] [address] [address] I48.91    Acute cholecystitis K81.0    Osteoarthritis M19.90    S/P total knee arthroplasty Z96.659    Perineal abscess L02.215    Infection of perineal wound T81. 4XXA       Total Joint Surgery Pre-Assessment Recommendations: The patient is of advanced age without support in the home and requesting discharge to SNF for rehabilitation. Patient would benefit from inpatient hospitalization with total knee surgery.                Signed By: Cyrus Lunsford NP-C    August 9, 2018

## 2018-08-20 NOTE — H&P
H&P    Patient ID:  Xuan Mckeon  [id number]  32 y.o.  1940  Surgeon:  Char Maravilla MD  Date of Surgery: * No surgery date entered *  Procedure: Right Knee Total Arthroplasty  Primary Care Physician: David Mosher MD        Subjective:  Xuan Mckeon is a 78 y.o. WHITE OR  female who presents with Right Knee pain. She has history of Right Knee pain for several months ago. Symptoms worse with walking and relieved with rest. Conservative treatment consisting of  therapeutic injections into the knee has not helped. The patient  lives with their family. The patients goal after surgery is improved pain and function. Past Medical History:   Diagnosis Date    Atrial septal aneurysm     per cardiac note 3/15    CAD (coronary artery disease)     per cardiac note 3/15: non-obstructive by cath     Dyslipidemia     managed with medication    Heart murmur     Echo 18, LVEF- 55-65%, mild aortic stenosis     History of breast cancer     left; s/p mastectomy    Hypertension     managed with medication    Osteoarthritis     Paroxysmal atrial fibrillation ([address]     managed with medication    Patent foramen ovale     per cardiac note 3/15    Valvular heart disease     per cardiac note 3/15: mild MR      Past Surgical History:   Procedure Laterality Date    HX BACK SURGERY      X3 lower back sx; no hardware    HX CATARACT REMOVAL Bilateral     HX  SECTION      x2    HX CHOLECYSTECTOMY  2014    HX HYSTERECTOMY  1979    HX MASTECTOMY Left     HX OTHER SURGICAL  2016    kari-anal abcess surgery     HX ROTATOR CUFF REPAIR Left 2007     Family History   Problem Relation Age of Onset    Stroke Mother     Stroke Father       Social History   Substance Use Topics    Smoking status: Never Smoker    Smokeless tobacco: Never Used    Alcohol use No       Prior to Admission medications    Medication Sig Start Date End Date Taking?  Authorizing Provider   mupirocin calcium (BACTROBAN) 2 % nasal ointment by Both Nostrils route two (2) times a day. Historical Provider   apixaban (ELIQUIS) 5 mg tablet Take 5 mg by mouth two (2) times a day. Historical Provider   magnesium 250 mg tab Take 1 Tab by mouth nightly. Historical Provider   multivitamin (ONE A DAY) tablet Take 1 Tab by mouth daily. Historical Provider   acetaminophen (TYLENOL EXTRA STRENGTH) 500 mg tablet Take 500 mg by mouth every six (6) hours as needed for Pain. Historical Provider   calcium-cholecalciferol, d3, 600-125 mg-unit tab Take 1 Tab by mouth daily. Phys Other, MD   fenofibrate (TRICOR) 160 mg tablet Take 160 mg by mouth nightly. Historical Provider   rosuvastatin (CRESTOR) 10 mg tablet Take 10 mg by mouth nightly. Historical Provider   benazepril (LOTENSIN) 20 mg tablet Take 20 mg by mouth daily. Indications: HYPERTENSION    Historical Provider   amiodarone (CORDARONE) 200 mg tablet Take 200 mg by mouth daily. Instructed to take DOS per Anesthesia guidelines. Indications: VENTRICULAR RATE CONTROL IN ATRIAL FIBRILLATION    Historical Provider     Allergies   Allergen Reactions    Sulfa (Sulfonamide Antibiotics) Rash    Tape [Adhesive] Contact Dermatitis        REVIEW OF SYSTEMS:  CONSTITUTIONAL: Denies fever, decreased appetite, weight loss/gain, night sweats or fatigue. HEENT: Denies vision or hearing changes. denies glasses. denies hearing aids. CARDIAC: Denies CP, palpitations, rheumatic fever, murmur, peripheral edema, carotid artery disease or syncopal episodes. RESPIRATORY: Denies dyspnea on exertion, asthma, COPD or orthopnea. GI: Denies GERD, history of GI bleed or melena, PUD, hepatitis or cirrhosis. : Denies dysuria, hematuria. denies BPH symptoms. HEMATOLOGIC: Denies anemia or blood disorders. ENDOCRINE: Denies thyroid disease. MUSCULOSKELETAL: See HPI. NEUROLOGIC: Denies seizure, peripheral neuropathy or memory loss. PSYCH: Denies depression, anxiety or insomnia.  SKIN: Denies rash or open sores. Objective:    PHYSICAL EXAM  GENERAL: No data found. EYES: PERRL. EOM intact. MOUTH:Teeth and Gums normal. NECK: Full ROM. Trachea midline. No thyromegaly or JVD. CARDIOVASCULAR: Regular rate and rhythm. No murmur or gallops. No carotid bruits. Peripheral pulses: radial 2 +, PT 2+, DP 2+ bilaterally. LUNGS: CTA bilaterally. No wheezes, rhonchi or rales. GI: positive BS. Abdomen nontender. NEUROLOGIC: Alert and oriented x 3. Bilateral equal strong had grasp and bilateral equal strong plantar flexion and dorsiflexion. GAIT: abnormal  MUSCULOSKELETAL: ROM: full range with pain. Tenderness: None. Crepitus: present. SKIN: No rash, bruising, swelling, redness or warmth. Labs:  No results found for this or any previous visit (from the past 24 hour(s)). Xray Right Knee: Joint space narrowing    Assessment:  Advanced Right Knee Osteoarthritis. Total Right Knee Arthroplasty Indicated. Patient Active Problem List   Diagnosis Code    HTN (hypertension) I10    Personal history of breast cancer Z85.3    Hyperlipidemia E78.5    Atrial fibrillation ([facility] [address] I48.91    Acute cholecystitis K81.0    Osteoarthritis M19.90    S/P total knee arthroplasty Z96.659    Perineal abscess L02.215    Infection of perineal wound T81. 4XXA       Plan:  I have advised the patient of the risks and consequences, including possible complications of performing total joint replacement, as well as not doing this operation. The patient had the opportunity to ask questions and have them answered to their satisfaction.      Signed:  LISA Keller 8/20/2018

## 2018-08-22 ENCOUNTER — ANESTHESIA EVENT (OUTPATIENT)
Dept: SURGERY | Age: 78
DRG: 470 | End: 2018-08-22
Payer: MEDICARE

## 2018-08-23 ENCOUNTER — HOSPITAL ENCOUNTER (INPATIENT)
Age: 78
LOS: 3 days | Discharge: NURSING, REHAB OR LONG-TERM CARE | DRG: 470 | End: 2018-08-26
Attending: ORTHOPAEDIC SURGERY | Admitting: ORTHOPAEDIC SURGERY
Payer: MEDICARE

## 2018-08-23 ENCOUNTER — ANESTHESIA (OUTPATIENT)
Dept: SURGERY | Age: 78
DRG: 470 | End: 2018-08-23
Payer: MEDICARE

## 2018-08-23 DIAGNOSIS — Z96.651 S/P TOTAL KNEE ARTHROPLASTY, RIGHT: ICD-10-CM

## 2018-08-23 DIAGNOSIS — I10 ESSENTIAL HYPERTENSION: ICD-10-CM

## 2018-08-23 LAB
ABO + RH BLD: NORMAL
APTT PPP: 30.1 SEC (ref 23.2–35.3)
BLOOD GROUP ANTIBODIES SERPL: NORMAL
GLUCOSE BLD STRIP.AUTO-MCNC: 89 MG/DL (ref 65–100)
HGB BLD-MCNC: 12.7 G/DL (ref 11.7–15.4)
INR PPP: 1.1
PROTHROMBIN TIME: 14.3 SEC (ref 11.5–14.5)
SPECIMEN EXP DATE BLD: NORMAL

## 2018-08-23 PROCEDURE — 94760 N-INVAS EAR/PLS OXIMETRY 1: CPT

## 2018-08-23 PROCEDURE — 77030033067 HC SUT PDO STRATFX SPIR J&J -B: Performed by: ORTHOPAEDIC SURGERY

## 2018-08-23 PROCEDURE — 77030013727 HC IRR FAN PULSVC ZIMM -B: Performed by: ORTHOPAEDIC SURGERY

## 2018-08-23 PROCEDURE — 74011250636 HC RX REV CODE- 250/636: Performed by: ANESTHESIOLOGY

## 2018-08-23 PROCEDURE — 77030034849: Performed by: ORTHOPAEDIC SURGERY

## 2018-08-23 PROCEDURE — 77030018673: Performed by: ORTHOPAEDIC SURGERY

## 2018-08-23 PROCEDURE — 77030006804 HC BLD SAW RECIP CNMD -B: Performed by: ORTHOPAEDIC SURGERY

## 2018-08-23 PROCEDURE — 77030020782 HC GWN BAIR PAWS FLX 3M -B: Performed by: ANESTHESIOLOGY

## 2018-08-23 PROCEDURE — 77030013819 HC MX SYS CEM ZIMM -B: Performed by: ORTHOPAEDIC SURGERY

## 2018-08-23 PROCEDURE — 85018 HEMOGLOBIN: CPT

## 2018-08-23 PROCEDURE — 77030006777 HC BLD SAW OSC CNMD -B: Performed by: ORTHOPAEDIC SURGERY

## 2018-08-23 PROCEDURE — 36415 COLL VENOUS BLD VENIPUNCTURE: CPT

## 2018-08-23 PROCEDURE — 74011250636 HC RX REV CODE- 250/636: Performed by: ORTHOPAEDIC SURGERY

## 2018-08-23 PROCEDURE — 77030037623 HC FEM TRIAL PK ATTUNE INTTN J&J -D: Performed by: ORTHOPAEDIC SURGERY

## 2018-08-23 PROCEDURE — 74011000302 HC RX REV CODE- 302: Performed by: ORTHOPAEDIC SURGERY

## 2018-08-23 PROCEDURE — 97161 PT EVAL LOW COMPLEX 20 MIN: CPT

## 2018-08-23 PROCEDURE — 77030018836 HC SOL IRR NACL ICUM -A: Performed by: ORTHOPAEDIC SURGERY

## 2018-08-23 PROCEDURE — 74011000250 HC RX REV CODE- 250: Performed by: ORTHOPAEDIC SURGERY

## 2018-08-23 PROCEDURE — 77030012935 HC DRSG AQUACEL BMS -B: Performed by: ORTHOPAEDIC SURGERY

## 2018-08-23 PROCEDURE — C1776 JOINT DEVICE (IMPLANTABLE): HCPCS | Performed by: ORTHOPAEDIC SURGERY

## 2018-08-23 PROCEDURE — 74011250636 HC RX REV CODE- 250/636

## 2018-08-23 PROCEDURE — 74011250637 HC RX REV CODE- 250/637: Performed by: ORTHOPAEDIC SURGERY

## 2018-08-23 PROCEDURE — 76210000016 HC OR PH I REC 1 TO 1.5 HR: Performed by: ORTHOPAEDIC SURGERY

## 2018-08-23 PROCEDURE — 77030011208: Performed by: ORTHOPAEDIC SURGERY

## 2018-08-23 PROCEDURE — 77030007880 HC KT SPN EPDRL BBMI -B: Performed by: ANESTHESIOLOGY

## 2018-08-23 PROCEDURE — 77030008467 HC STPLR SKN COVD -B: Performed by: ORTHOPAEDIC SURGERY

## 2018-08-23 PROCEDURE — C1713 ANCHOR/SCREW BN/BN,TIS/BN: HCPCS | Performed by: ORTHOPAEDIC SURGERY

## 2018-08-23 PROCEDURE — 86900 BLOOD TYPING SEROLOGIC ABO: CPT

## 2018-08-23 PROCEDURE — 85730 THROMBOPLASTIN TIME PARTIAL: CPT

## 2018-08-23 PROCEDURE — 76010000162 HC OR TIME 1.5 TO 2 HR INTENSV-TIER 1: Performed by: ORTHOPAEDIC SURGERY

## 2018-08-23 PROCEDURE — 76942 ECHO GUIDE FOR BIOPSY: CPT | Performed by: ORTHOPAEDIC SURGERY

## 2018-08-23 PROCEDURE — 74011250637 HC RX REV CODE- 250/637: Performed by: ANESTHESIOLOGY

## 2018-08-23 PROCEDURE — 85610 PROTHROMBIN TIME: CPT

## 2018-08-23 PROCEDURE — 77030020256 HC SOL INJ NACL 0.9%  500ML: Performed by: ORTHOPAEDIC SURGERY

## 2018-08-23 PROCEDURE — 74011000250 HC RX REV CODE- 250

## 2018-08-23 PROCEDURE — 74011000258 HC RX REV CODE- 258: Performed by: ORTHOPAEDIC SURGERY

## 2018-08-23 PROCEDURE — 97110 THERAPEUTIC EXERCISES: CPT

## 2018-08-23 PROCEDURE — 76010010054 HC POST OP PAIN BLOCK: Performed by: ORTHOPAEDIC SURGERY

## 2018-08-23 PROCEDURE — 0SRC0J9 REPLACEMENT OF RIGHT KNEE JOINT WITH SYNTHETIC SUBSTITUTE, CEMENTED, OPEN APPROACH: ICD-10-PCS | Performed by: ORTHOPAEDIC SURGERY

## 2018-08-23 PROCEDURE — 65270000029 HC RM PRIVATE

## 2018-08-23 PROCEDURE — 77030031139 HC SUT VCRL2 J&J -A: Performed by: ORTHOPAEDIC SURGERY

## 2018-08-23 PROCEDURE — 77030003665 HC NDL SPN BBMI -A: Performed by: ANESTHESIOLOGY

## 2018-08-23 PROCEDURE — 77030019557 HC ELECTRD VES SEAL MEDT -F: Performed by: ORTHOPAEDIC SURGERY

## 2018-08-23 PROCEDURE — 97165 OT EVAL LOW COMPLEX 30 MIN: CPT

## 2018-08-23 PROCEDURE — 77030003602 HC NDL NRV BLK BBMI -B: Performed by: ANESTHESIOLOGY

## 2018-08-23 PROCEDURE — 82962 GLUCOSE BLOOD TEST: CPT

## 2018-08-23 PROCEDURE — 86580 TB INTRADERMAL TEST: CPT | Performed by: ORTHOPAEDIC SURGERY

## 2018-08-23 PROCEDURE — 77030006720 HC BLD PAT RMR ZIMM -B: Performed by: ORTHOPAEDIC SURGERY

## 2018-08-23 PROCEDURE — 76060000034 HC ANESTHESIA 1.5 TO 2 HR: Performed by: ORTHOPAEDIC SURGERY

## 2018-08-23 DEVICE — COMPONENT FEM SZ 5 R KNEE POST STBL CEM ATTUNE: Type: IMPLANTABLE DEVICE | Site: KNEE | Status: FUNCTIONAL

## 2018-08-23 DEVICE — INSERT TIB SZ 5 THK7MM KNEE POST STBL ROT PLATFRM ATTUNE: Type: IMPLANTABLE DEVICE | Site: KNEE | Status: FUNCTIONAL

## 2018-08-23 DEVICE — (D)CEMENT BNE HV R 40GM -- DUPE USE ITEM 353850: Type: IMPLANTABLE DEVICE | Site: KNEE | Status: FUNCTIONAL

## 2018-08-23 DEVICE — COMPONENT PAT DIA38MM POLYETH DOME CEM MEDIALIZED ATTUNE: Type: IMPLANTABLE DEVICE | Site: KNEE | Status: FUNCTIONAL

## 2018-08-23 RX ORDER — DEXAMETHASONE SODIUM PHOSPHATE 100 MG/10ML
10 INJECTION INTRAMUSCULAR; INTRAVENOUS ONCE
Status: ACTIVE | OUTPATIENT
Start: 2018-08-24 | End: 2018-08-25

## 2018-08-23 RX ORDER — HYDRALAZINE HYDROCHLORIDE 20 MG/ML
10 INJECTION INTRAMUSCULAR; INTRAVENOUS
Status: DISCONTINUED | OUTPATIENT
Start: 2018-08-23 | End: 2018-08-26 | Stop reason: HOSPADM

## 2018-08-23 RX ORDER — HYDROMORPHONE HYDROCHLORIDE 2 MG/ML
0.5 INJECTION, SOLUTION INTRAMUSCULAR; INTRAVENOUS; SUBCUTANEOUS
Status: DISCONTINUED | OUTPATIENT
Start: 2018-08-23 | End: 2018-08-23 | Stop reason: HOSPADM

## 2018-08-23 RX ORDER — ZOLPIDEM TARTRATE 5 MG/1
5 TABLET ORAL
Status: DISCONTINUED | OUTPATIENT
Start: 2018-08-23 | End: 2018-08-26 | Stop reason: HOSPADM

## 2018-08-23 RX ORDER — KETOROLAC TROMETHAMINE 30 MG/ML
INJECTION, SOLUTION INTRAMUSCULAR; INTRAVENOUS AS NEEDED
Status: DISCONTINUED | OUTPATIENT
Start: 2018-08-23 | End: 2018-08-23 | Stop reason: HOSPADM

## 2018-08-23 RX ORDER — ROPIVACAINE HYDROCHLORIDE 2 MG/ML
INJECTION, SOLUTION EPIDURAL; INFILTRATION; PERINEURAL AS NEEDED
Status: DISCONTINUED | OUTPATIENT
Start: 2018-08-23 | End: 2018-08-23 | Stop reason: HOSPADM

## 2018-08-23 RX ORDER — BENAZEPRIL HYDROCHLORIDE 10 MG/1
20 TABLET ORAL DAILY
Status: DISCONTINUED | OUTPATIENT
Start: 2018-08-24 | End: 2018-08-23

## 2018-08-23 RX ORDER — MIDAZOLAM HYDROCHLORIDE 1 MG/ML
2 INJECTION, SOLUTION INTRAMUSCULAR; INTRAVENOUS
Status: COMPLETED | OUTPATIENT
Start: 2018-08-23 | End: 2018-08-23

## 2018-08-23 RX ORDER — HYDROCODONE BITARTRATE AND ACETAMINOPHEN 7.5; 325 MG/1; MG/1
1 TABLET ORAL AS NEEDED
Status: DISCONTINUED | OUTPATIENT
Start: 2018-08-23 | End: 2018-08-23 | Stop reason: HOSPADM

## 2018-08-23 RX ORDER — DEXAMETHASONE SODIUM PHOSPHATE 4 MG/ML
INJECTION, SOLUTION INTRA-ARTICULAR; INTRALESIONAL; INTRAMUSCULAR; INTRAVENOUS; SOFT TISSUE AS NEEDED
Status: DISCONTINUED | OUTPATIENT
Start: 2018-08-23 | End: 2018-08-23 | Stop reason: HOSPADM

## 2018-08-23 RX ORDER — TRANEXAMIC ACID 100 MG/ML
INJECTION, SOLUTION INTRAVENOUS AS NEEDED
Status: DISCONTINUED | OUTPATIENT
Start: 2018-08-23 | End: 2018-08-23 | Stop reason: HOSPADM

## 2018-08-23 RX ORDER — SODIUM CHLORIDE 0.9 % (FLUSH) 0.9 %
5-10 SYRINGE (ML) INJECTION EVERY 8 HOURS
Status: DISCONTINUED | OUTPATIENT
Start: 2018-08-23 | End: 2018-08-23 | Stop reason: HOSPADM

## 2018-08-23 RX ORDER — HYDROMORPHONE HYDROCHLORIDE 2 MG/ML
1 INJECTION, SOLUTION INTRAMUSCULAR; INTRAVENOUS; SUBCUTANEOUS
Status: DISCONTINUED | OUTPATIENT
Start: 2018-08-23 | End: 2018-08-26 | Stop reason: HOSPADM

## 2018-08-23 RX ORDER — CELECOXIB 200 MG/1
200 CAPSULE ORAL EVERY 12 HOURS
Status: DISCONTINUED | OUTPATIENT
Start: 2018-08-23 | End: 2018-08-26 | Stop reason: HOSPADM

## 2018-08-23 RX ORDER — AMIODARONE HYDROCHLORIDE 200 MG/1
200 TABLET ORAL DAILY
Status: DISCONTINUED | OUTPATIENT
Start: 2018-08-24 | End: 2018-08-26 | Stop reason: HOSPADM

## 2018-08-23 RX ORDER — NALOXONE HYDROCHLORIDE 0.4 MG/ML
.2-.4 INJECTION, SOLUTION INTRAMUSCULAR; INTRAVENOUS; SUBCUTANEOUS
Status: DISCONTINUED | OUTPATIENT
Start: 2018-08-23 | End: 2018-08-26 | Stop reason: HOSPADM

## 2018-08-23 RX ORDER — LIDOCAINE HYDROCHLORIDE 10 MG/ML
0.1 INJECTION INFILTRATION; PERINEURAL AS NEEDED
Status: DISCONTINUED | OUTPATIENT
Start: 2018-08-23 | End: 2018-08-23 | Stop reason: HOSPADM

## 2018-08-23 RX ORDER — ACETAMINOPHEN 10 MG/ML
1000 INJECTION, SOLUTION INTRAVENOUS ONCE
Status: COMPLETED | OUTPATIENT
Start: 2018-08-23 | End: 2018-08-23

## 2018-08-23 RX ORDER — FAMOTIDINE 20 MG/1
20 TABLET, FILM COATED ORAL ONCE
Status: COMPLETED | OUTPATIENT
Start: 2018-08-23 | End: 2018-08-23

## 2018-08-23 RX ORDER — SODIUM CHLORIDE 0.9 % (FLUSH) 0.9 %
5-10 SYRINGE (ML) INJECTION AS NEEDED
Status: DISCONTINUED | OUTPATIENT
Start: 2018-08-23 | End: 2018-08-26 | Stop reason: HOSPADM

## 2018-08-23 RX ORDER — ONDANSETRON 8 MG/1
8 TABLET, ORALLY DISINTEGRATING ORAL
Status: DISCONTINUED | OUTPATIENT
Start: 2018-08-23 | End: 2018-08-26 | Stop reason: HOSPADM

## 2018-08-23 RX ORDER — OXYCODONE HYDROCHLORIDE 5 MG/1
5 TABLET ORAL
Status: DISCONTINUED | OUTPATIENT
Start: 2018-08-23 | End: 2018-08-26 | Stop reason: HOSPADM

## 2018-08-23 RX ORDER — NALOXONE HYDROCHLORIDE 0.4 MG/ML
0.1 INJECTION, SOLUTION INTRAMUSCULAR; INTRAVENOUS; SUBCUTANEOUS AS NEEDED
Status: DISCONTINUED | OUTPATIENT
Start: 2018-08-23 | End: 2018-08-23 | Stop reason: HOSPADM

## 2018-08-23 RX ORDER — OXYCODONE HYDROCHLORIDE 5 MG/1
5 TABLET ORAL
Status: DISCONTINUED | OUTPATIENT
Start: 2018-08-23 | End: 2018-08-23 | Stop reason: HOSPADM

## 2018-08-23 RX ORDER — BUPIVACAINE HYDROCHLORIDE 7.5 MG/ML
INJECTION, SOLUTION INTRASPINAL AS NEEDED
Status: DISCONTINUED | OUTPATIENT
Start: 2018-08-23 | End: 2018-08-23 | Stop reason: HOSPADM

## 2018-08-23 RX ORDER — ASPIRIN 81 MG/1
81 TABLET ORAL DAILY
Status: DISCONTINUED | OUTPATIENT
Start: 2018-08-24 | End: 2018-08-26 | Stop reason: HOSPADM

## 2018-08-23 RX ORDER — DIPHENHYDRAMINE HCL 25 MG
25 CAPSULE ORAL
Status: DISCONTINUED | OUTPATIENT
Start: 2018-08-23 | End: 2018-08-26 | Stop reason: HOSPADM

## 2018-08-23 RX ORDER — SODIUM CHLORIDE 9 MG/ML
25 INJECTION, SOLUTION INTRAVENOUS CONTINUOUS
Status: DISCONTINUED | OUTPATIENT
Start: 2018-08-23 | End: 2018-08-23 | Stop reason: HOSPADM

## 2018-08-23 RX ORDER — SODIUM CHLORIDE 0.9 % (FLUSH) 0.9 %
5-10 SYRINGE (ML) INJECTION EVERY 8 HOURS
Status: DISCONTINUED | OUTPATIENT
Start: 2018-08-23 | End: 2018-08-26 | Stop reason: HOSPADM

## 2018-08-23 RX ORDER — SODIUM CHLORIDE 0.9 % (FLUSH) 0.9 %
5-10 SYRINGE (ML) INJECTION AS NEEDED
Status: DISCONTINUED | OUTPATIENT
Start: 2018-08-23 | End: 2018-08-23 | Stop reason: HOSPADM

## 2018-08-23 RX ORDER — SODIUM CHLORIDE 9 MG/ML
100 INJECTION, SOLUTION INTRAVENOUS CONTINUOUS
Status: DISPENSED | OUTPATIENT
Start: 2018-08-23 | End: 2018-08-25

## 2018-08-23 RX ORDER — PROPOFOL 10 MG/ML
INJECTION, EMULSION INTRAVENOUS AS NEEDED
Status: DISCONTINUED | OUTPATIENT
Start: 2018-08-23 | End: 2018-08-23 | Stop reason: HOSPADM

## 2018-08-23 RX ORDER — EPHEDRINE SULFATE 50 MG/ML
INJECTION, SOLUTION INTRAVENOUS AS NEEDED
Status: DISCONTINUED | OUTPATIENT
Start: 2018-08-23 | End: 2018-08-23 | Stop reason: HOSPADM

## 2018-08-23 RX ORDER — ONDANSETRON 2 MG/ML
INJECTION INTRAMUSCULAR; INTRAVENOUS AS NEEDED
Status: DISCONTINUED | OUTPATIENT
Start: 2018-08-23 | End: 2018-08-23 | Stop reason: HOSPADM

## 2018-08-23 RX ORDER — MIDAZOLAM HYDROCHLORIDE 1 MG/ML
INJECTION, SOLUTION INTRAMUSCULAR; INTRAVENOUS AS NEEDED
Status: DISCONTINUED | OUTPATIENT
Start: 2018-08-23 | End: 2018-08-23 | Stop reason: HOSPADM

## 2018-08-23 RX ORDER — PROMETHAZINE HYDROCHLORIDE 25 MG/1
25 TABLET ORAL
Status: DISCONTINUED | OUTPATIENT
Start: 2018-08-23 | End: 2018-08-26 | Stop reason: HOSPADM

## 2018-08-23 RX ORDER — OXYCODONE HYDROCHLORIDE 5 MG/1
10 TABLET ORAL
Status: DISCONTINUED | OUTPATIENT
Start: 2018-08-23 | End: 2018-08-26 | Stop reason: HOSPADM

## 2018-08-23 RX ORDER — AMOXICILLIN 250 MG
2 CAPSULE ORAL DAILY
Status: DISCONTINUED | OUTPATIENT
Start: 2018-08-24 | End: 2018-08-26 | Stop reason: HOSPADM

## 2018-08-23 RX ORDER — WARFARIN SODIUM 5 MG/1
5 TABLET ORAL
Status: DISCONTINUED | OUTPATIENT
Start: 2018-08-23 | End: 2018-08-25

## 2018-08-23 RX ORDER — CEFAZOLIN SODIUM/WATER 2 G/20 ML
2 SYRINGE (ML) INTRAVENOUS EVERY 8 HOURS
Status: COMPLETED | OUTPATIENT
Start: 2018-08-23 | End: 2018-08-24

## 2018-08-23 RX ORDER — FENTANYL CITRATE 50 UG/ML
100 INJECTION, SOLUTION INTRAMUSCULAR; INTRAVENOUS ONCE
Status: COMPLETED | OUTPATIENT
Start: 2018-08-23 | End: 2018-08-23

## 2018-08-23 RX ORDER — ACETAMINOPHEN 500 MG
1000 TABLET ORAL EVERY 6 HOURS
Status: DISCONTINUED | OUTPATIENT
Start: 2018-08-24 | End: 2018-08-26 | Stop reason: HOSPADM

## 2018-08-23 RX ORDER — ROSUVASTATIN CALCIUM 5 MG/1
10 TABLET, COATED ORAL
Status: DISCONTINUED | OUTPATIENT
Start: 2018-08-23 | End: 2018-08-26 | Stop reason: HOSPADM

## 2018-08-23 RX ORDER — SODIUM CHLORIDE, SODIUM LACTATE, POTASSIUM CHLORIDE, CALCIUM CHLORIDE 600; 310; 30; 20 MG/100ML; MG/100ML; MG/100ML; MG/100ML
100 INJECTION, SOLUTION INTRAVENOUS CONTINUOUS
Status: DISCONTINUED | OUTPATIENT
Start: 2018-08-23 | End: 2018-08-23 | Stop reason: HOSPADM

## 2018-08-23 RX ORDER — CEFAZOLIN SODIUM/WATER 2 G/20 ML
2 SYRINGE (ML) INTRAVENOUS ONCE
Status: COMPLETED | OUTPATIENT
Start: 2018-08-23 | End: 2018-08-23

## 2018-08-23 RX ORDER — ENOXAPARIN SODIUM 100 MG/ML
30 INJECTION SUBCUTANEOUS DAILY
Status: DISCONTINUED | OUTPATIENT
Start: 2018-08-24 | End: 2018-08-26

## 2018-08-23 RX ORDER — VANCOMYCIN HYDROCHLORIDE
1250 ONCE
Status: COMPLETED | OUTPATIENT
Start: 2018-08-23 | End: 2018-08-23

## 2018-08-23 RX ORDER — PROPOFOL 10 MG/ML
INJECTION, EMULSION INTRAVENOUS
Status: DISCONTINUED | OUTPATIENT
Start: 2018-08-23 | End: 2018-08-23 | Stop reason: HOSPADM

## 2018-08-23 RX ADMIN — PROPOFOL 100 MCG/KG/MIN: 10 INJECTION, EMULSION INTRAVENOUS at 11:52

## 2018-08-23 RX ADMIN — PROPOFOL 30 MG: 10 INJECTION, EMULSION INTRAVENOUS at 11:52

## 2018-08-23 RX ADMIN — FENTANYL CITRATE 100 MCG: 50 INJECTION INTRAMUSCULAR; INTRAVENOUS at 11:04

## 2018-08-23 RX ADMIN — MIDAZOLAM HYDROCHLORIDE 2 MG: 1 INJECTION, SOLUTION INTRAMUSCULAR; INTRAVENOUS at 11:47

## 2018-08-23 RX ADMIN — EPHEDRINE SULFATE 10 MG: 50 INJECTION, SOLUTION INTRAVENOUS at 12:03

## 2018-08-23 RX ADMIN — BUPIVACAINE HYDROCHLORIDE 1.7 ML: 7.5 INJECTION, SOLUTION INTRASPINAL at 11:49

## 2018-08-23 RX ADMIN — ROPIVACAINE HYDROCHLORIDE 20 ML: 2 INJECTION, SOLUTION EPIDURAL; INFILTRATION; PERINEURAL at 11:11

## 2018-08-23 RX ADMIN — TRANEXAMIC ACID 1000 MG: 100 INJECTION, SOLUTION INTRAVENOUS at 11:46

## 2018-08-23 RX ADMIN — MIDAZOLAM HYDROCHLORIDE 2 MG: 1 INJECTION, SOLUTION INTRAMUSCULAR; INTRAVENOUS at 11:04

## 2018-08-23 RX ADMIN — ACETAMINOPHEN 1000 MG: 500 TABLET, FILM COATED ORAL at 23:33

## 2018-08-23 RX ADMIN — ONDANSETRON 4 MG: 2 INJECTION INTRAMUSCULAR; INTRAVENOUS at 12:03

## 2018-08-23 RX ADMIN — OXYCODONE HYDROCHLORIDE 10 MG: 5 TABLET ORAL at 16:39

## 2018-08-23 RX ADMIN — Medication 2 G: at 21:27

## 2018-08-23 RX ADMIN — TUBERCULIN PURIFIED PROTEIN DERIVATIVE 5 UNITS: 5 INJECTION, SOLUTION INTRADERMAL at 09:55

## 2018-08-23 RX ADMIN — Medication 5 ML: at 17:23

## 2018-08-23 RX ADMIN — WARFARIN SODIUM 5 MG: 5 TABLET ORAL at 21:26

## 2018-08-23 RX ADMIN — ACETAMINOPHEN 1000 MG: 10 INJECTION, SOLUTION INTRAVENOUS at 17:22

## 2018-08-23 RX ADMIN — FAMOTIDINE 20 MG: 20 TABLET, FILM COATED ORAL at 09:53

## 2018-08-23 RX ADMIN — SODIUM CHLORIDE, SODIUM LACTATE, POTASSIUM CHLORIDE, AND CALCIUM CHLORIDE 100 ML/HR: 600; 310; 30; 20 INJECTION, SOLUTION INTRAVENOUS at 09:52

## 2018-08-23 RX ADMIN — DEXAMETHASONE SODIUM PHOSPHATE 5 MG: 4 INJECTION, SOLUTION INTRA-ARTICULAR; INTRALESIONAL; INTRAMUSCULAR; INTRAVENOUS; SOFT TISSUE at 12:03

## 2018-08-23 RX ADMIN — EPHEDRINE SULFATE 10 MG: 50 INJECTION, SOLUTION INTRAVENOUS at 12:53

## 2018-08-23 RX ADMIN — SODIUM CHLORIDE 100 ML/HR: 900 INJECTION, SOLUTION INTRAVENOUS at 17:22

## 2018-08-23 RX ADMIN — Medication 2 G: at 11:47

## 2018-08-23 RX ADMIN — VANCOMYCIN HYDROCHLORIDE 1250 MG: 10 INJECTION, POWDER, LYOPHILIZED, FOR SOLUTION INTRAVENOUS at 11:36

## 2018-08-23 RX ADMIN — CELECOXIB 200 MG: 200 CAPSULE ORAL at 21:26

## 2018-08-23 RX ADMIN — EPHEDRINE SULFATE 10 MG: 50 INJECTION, SOLUTION INTRAVENOUS at 12:30

## 2018-08-23 RX ADMIN — EPHEDRINE SULFATE 10 MG: 50 INJECTION, SOLUTION INTRAVENOUS at 12:45

## 2018-08-23 RX ADMIN — ROSUVASTATIN CALCIUM 10 MG: 5 TABLET, FILM COATED ORAL at 21:26

## 2018-08-23 RX ADMIN — OXYCODONE HYDROCHLORIDE 10 MG: 5 TABLET ORAL at 23:33

## 2018-08-23 NOTE — PERIOP NOTES
Betadine lavage:  17.5cc of betadine lot #[id number]  , exp. Date: 01/20  ,  in 500cc of . 9NS Lot # [id number] , exp. Date : 1SHS1126.

## 2018-08-23 NOTE — INTERVAL H&P NOTE
H&P Update:  Carlos Alberto Garcia was seen and examined. History and physical has been reviewed. The patient has been examined.  There have been no significant clinical changes since the completion of the originally dated History and Physical.    Signed By: LISA Vargas     August 23, 2018 11:13 AM

## 2018-08-23 NOTE — IP AVS SNAPSHOT
[address] [place] [address] 
[phone] Patient: Heber Johansen MRN: [id number] ZDD:[id number] About your hospitalization You were admitted on:  August 23, 2018 You last received care in the:  [facility] You were discharged on:  August 26, 2018 Why you were hospitalized Your primary diagnosis was:  S/P Total Knee Arthroplasty, Right Your diagnoses also included:  Osteoarthritis Follow-up Information Follow up With Details Comments Contact Info [address] [facility]   [facility] [address] [address] 
[phone] Discharge Orders None A check santy indicates which time of day the medication should be taken. My Medications ASK your doctor about these medications Instructions Each Dose to Equal  
 Morning Noon Evening Bedtime  
 amiodarone 200 mg tablet Commonly known as:  CORDARONE Your last dose was: Your next dose is: Take 200 mg by mouth daily. Instructed to take DOS per Anesthesia guidelines. Indications: VENTRICULAR RATE CONTROL IN ATRIAL FIBRILLATION  
 200 mg  
    
   
   
   
  
 benazepril 20 mg tablet Commonly known as:  LOTENSIN Your last dose was: Your next dose is: Take 20 mg by mouth daily. Indications: HYPERTENSION  
 20 mg  
    
   
   
   
  
 calcium-cholecalciferol (d3) 600-125 mg-unit Tab Your last dose was: Your next dose is: Take 1 Tab by mouth daily. 1 Tab CRESTOR 10 mg tablet Generic drug:  rosuvastatin Your last dose was: Your next dose is: Take 10 mg by mouth nightly. 10 mg  
    
   
   
   
  
 ELIQUIS 5 mg tablet Generic drug:  apixaban Your last dose was: Your next dose is: Take 5 mg by mouth two (2) times a day. 5 mg fenofibrate 160 mg tablet Commonly known as:  LOFIBRA Your last dose was: Your next dose is: Take 160 mg by mouth nightly. 160 mg  
    
   
   
   
  
 magnesium 250 mg Tab Your last dose was: Your next dose is: Take 1 Tab by mouth nightly. 1 Tab  
    
   
   
   
  
 multivitamin tablet Commonly known as:  ONE A DAY Your last dose was: Your next dose is: Take 1 Tab by mouth daily. 1 Tab  
    
   
   
   
  
 mupirocin calcium 2 % nasal ointment Commonly known as:  [facility] Your last dose was: Your next dose is:    
   
   
 by Both Nostrils route two (2) times a day. TYLENOL EXTRA STRENGTH 500 mg tablet Generic drug:  acetaminophen Your last dose was: Your next dose is: Take 500 mg by mouth every six (6) hours as needed for Pain. 500 mg Discharge Instructions None [facility] Announcement We are excited to announce that we are making your provider's discharge notes available to you in [employer]os[employer]. You will see these notes when they are completed and signed by the physician that discharged you from your recent hospital stay. If you have any questions or concerns about any information you see in Kurtos[employer], please call the Health Information Department where you were seen or reach out to your Primary Care Provider for more information about your plan of care. Introducing [facility]! Rosas Barboza introduces [employer] patient portal. Now you can access parts of your medical record, email your doctor's office, and request medication refills online. 1. In your internet browser, go to [email or web address] 2. Click on the First Time User? Click Here link in the Sign In box. You will see the New Member Sign Up page. 3. Enter your Shiftgig Access Code exactly as it appears below. You will not need to use this code after youve completed the sign-up process. If you do not sign up before the expiration date, you must request a new code. · Shiftgig Access Code: [email or web address] Expires: 10/7/2018  8:59 AM 
 
4. Enter the last four digits of your Social Security Number (xxxx) and Date of Birth (mm/dd/yyyy) as indicated and click Submit. You will be taken to the next sign-up page. 5. Create a Snibbe Studiot ID. This will be your Shiftgig login ID and cannot be changed, so think of one that is secure and easy to remember. 6. Create a Shiftgig password. You can change your password at any time. 7. Enter your Password Reset Question and Answer. This can be used at a later time if you forget your password. 8. Enter your e-mail address. You will receive e-mail notification when new information is available in [address] 9. Click Sign Up. You can now view and download portions of your medical record. 10. Click the Download Summary menu link to download a portable copy of your medical information. If you have questions, please visit the Frequently Asked Questions section of the Shiftgig website. Remember, Shiftgig is NOT to be used for urgent needs. For medical emergencies, dial 911. Now available from your iPhone and Android! Introducing Juan Jones As a Miguel Ashby patient, I wanted to make you aware of our electronic visit tool called Juan Robert. [facility] 24/7 allows you to connect within minutes with a medical provider 24 hours a day, seven days a week via a mobile device or tablet or logging into a secure website from your computer. You can access Juan Jones from anywhere in the [place].  
 
A virtual visit might be right for you when you have a simple condition and feel like you just dont want to get out of bed, or cant get away from work for an appointment, when your regular [facility] provider is not available (evenings, weekends or holidays), or when youre out of town and need minor care. Electronic visits cost only $49 and if the [facility] 24/7 provider determines a prescription is needed to treat your condition, one can be electronically transmitted to a nearby pharmacy*. Please take a moment to enroll today if you have not already done so. The enrollment process is free and takes just a few minutes. To enroll, please download the Cherl Grain 24/7 angel to your tablet or phone, or visit [email or web address] to enroll on your computer. And, as an [address] [place] [address] patient with a [employer] account, the results of your visits will be scanned into your electronic medical record and your primary care provider will be able to view the scanned results. We urge you to continue to see your regular [employer] provider for your ongoing medical care. And while your primary care provider may not be the one available when you seek a [facility] virtual visit, the peace of mind you get from getting a real diagnosis real time can be priceless. For more information on [facility], view our Frequently Asked Questions (FAQs) at [email or web address]. Sincerely, 
 
Brandi Wooten MD 
Chief Medical Officer [address] *:  certain medications cannot be prescribed via [facility] Providers Seen During Your Hospitalization Provider Specialty Primary office phone Luis Carlos Yañez MD Orthopedic Surgery [phone] Immunizations Administered for This Admission Name Date  
 TB Skin Test (PPD) Intradermal 8/23/2018 Your Primary Care Physician (PCP) Primary Care Physician Office Phone Office Fax [address] You are allergic to the following Allergen Reactions Sulfa (Sulfonamide Antibiotics) Rash Tape (Adhesive) Contact Dermatitis Recent Documentation Height Weight Breastfeeding? BMI OB Status Smoking Status 1.651 m 79.1 kg No 29.01 kg/m2 Hysterectomy Never Smoker Emergency Contacts Name Discharge Info Relation Home Work Mobile [employer] CAREGIVER [3] Son [22] [phone] [employer] CAREGIVER [3] Daughter [21] [phone] Patient Belongings The following personal items are in your possession at time of discharge: 
  Dental Appliances: Uppers, With patient  Visual Aid: Glasses   Hearing Aids/Status: Other (comment)  Home Medications: None   Jewelry: None  Clothing: Other (comment)    Other Valuables: None Please provide this summary of care documentation to your next provider. Signatures-by signing, you are acknowledging that this After Visit Summary has been reviewed with you and you have received a copy. Patient Signature:  ____________________________________________________________ Date:  ____________________________________________________________  
  
Aloma Snellen Provider Signature:  ____________________________________________________________ Date:  ____________________________________________________________

## 2018-08-23 NOTE — ANESTHESIA PROCEDURE NOTES
Spinal Block    Start time: 8/23/2018 11:43 AM  End time: 8/23/2018 11:49 AM  Performed by: Nicola Carver  Authorized by: Nicola Carver     Pre-procedure:   Indications: primary anesthetic  Preanesthetic Checklist: patient identified, risks and benefits discussed, anesthesia consent, site marked, patient being monitored and timeout performed    Timeout Time: 11:43          Spinal Block:   Patient Position:  Seated  Prep Region:  Lumbar  Prep: DuraPrep      Location:  L3-4  Technique:  Single shot  Local:  Lidocaine 1%  Local Dose (mL):  3    Needle:   Needle Type:  Pencan  Needle Gauge:  22 G  Attempts:  1      Events: CSF confirmed, no blood with aspiration and no paresthesia        Assessment:  Insertion:  Uncomplicated  Patient tolerance:  Patient tolerated the procedure well with no immediate complications

## 2018-08-23 NOTE — CONSULTS
Hospitalist Consult Note      Patient: Ayo Zuniga               Sex: female             MRN: [id number]      YOB: 1940      Age:  70 y.o. Reason for Consult:  Medical Managment    HPI     This is a 79-year-old female with a past medical history of coronary artery disease, atrial septal aneurysm, hyperlipidemia, history of breast cancer, hypertension, paroxysmal A. fib comes into the hospital for right total knee arthroplasty which is done on 2018, Patient is doing well post operatively, denies any Chest pain, SOB, Palpitations. Pain is controlled, No Nausea or vomiting  VS reviewed as below    Review of Systems  Comprehensive 10 point ROS is done, and pertinent positives & negatives per HPI, rest of them are negative.     Past Medical History:   Diagnosis Date    Atrial septal aneurysm     per cardiac note 3/15    CAD (coronary artery disease)     per cardiac note 3/15: non-obstructive by cath     Dyslipidemia     managed with medication    Heart murmur     Echo 18, LVEF- 55-65%, mild aortic stenosis     History of breast cancer     left; s/p mastectomy    Hypertension     managed with medication    Osteoarthritis     Paroxysmal atrial fibrillation ([address]     managed with medication    Patent foramen ovale     per cardiac note 3/15    Valvular heart disease     per cardiac note 3/15: mild MR       Past Surgical History:   Procedure Laterality Date    HX BACK SURGERY      X3 lower back sx; no hardware    HX CATARACT REMOVAL Bilateral     HX  SECTION      x2    HX CHOLECYSTECTOMY  2014    HX HYSTERECTOMY  1979    HX MASTECTOMY Left     HX OTHER SURGICAL  2016    kari-anal abcess surgery     HX ROTATOR CUFF REPAIR Left        Family History   Problem Relation Age of Onset    Stroke Mother     Stroke Father        Social History     Social History    Marital status:      Spouse name: N/A    Number of children: N/A    Years of education: N/A     Social History Main Topics    Smoking status: Never Smoker    Smokeless tobacco: Never Used    Alcohol use No    Drug use: No    Sexual activity: Not Asked     Other Topics Concern    None     Social History Narrative       Allergies   Allergen Reactions    Sulfa (Sulfonamide Antibiotics) Rash    Tape [Adhesive] Contact Dermatitis       Prior to Admission medications    Medication Sig Start Date End Date Taking? Authorizing Provider   mupirocin calcium (BACTROBAN) 2 % nasal ointment by Both Nostrils route two (2) times a day. Yes Historical Provider   apixaban (ELIQUIS) 5 mg tablet Take 5 mg by mouth two (2) times a day. Yes Historical Provider   magnesium 250 mg tab Take 1 Tab by mouth nightly. Yes Historical Provider   multivitamin (ONE A DAY) tablet Take 1 Tab by mouth daily. Yes Historical Provider   acetaminophen (TYLENOL EXTRA STRENGTH) 500 mg tablet Take 500 mg by mouth every six (6) hours as needed for Pain. Yes Historical Provider   calcium-cholecalciferol, d3, 600-125 mg-unit tab Take 1 Tab by mouth daily. Yes Phys Other, MD   fenofibrate (TRICOR) 160 mg tablet Take 160 mg by mouth nightly. Yes Historical Provider   rosuvastatin (CRESTOR) 10 mg tablet Take 10 mg by mouth nightly. Yes Historical Provider   benazepril (LOTENSIN) 20 mg tablet Take 20 mg by mouth daily. Indications: HYPERTENSION   Yes Historical Provider   amiodarone (CORDARONE) 200 mg tablet Take 200 mg by mouth daily. Instructed to take DOS per Anesthesia guidelines.   Indications: VENTRICULAR RATE CONTROL IN ATRIAL FIBRILLATION   Yes Historical Provider         Physical Exam     Visit Vitals    /69    Pulse (!) 57    Temp 97.2 °F (36.2 °C)    Resp 20    Ht 5' 5\" (1.651 m)    Wt 79.1 kg (174 lb 4.8 oz)    SpO2 98%    Breastfeeding No    BMI 29.01 kg/m2      Temp (24hrs), Av.3 °F (36.3 °C), Min:97.2 °F (36.2 °C), Max:97.4 °F (36.3 °C)    Oxygen Therapy  O2 Sat (%): 98 % (18 1600)  Pulse via Oximetry: 63 beats per minute (08/23/18 1600)  O2 Device: Room air (08/23/18 1600)  O2 Flow Rate (L/min): 0 l/min (08/23/18 1600)    Intake/Output Summary (Last 24 hours) at 08/23/18 1714  Last data filed at 08/23/18 1419   Gross per 24 hour   Intake             1950 ml   Output              925 ml   Net             1025 ml          General:- Conscious, No acute distress   Eyes:- No pallor/icterus    HENT- Oral Mucosa is Moist,   Neck:- Supple, No JVD  Lungs- CTA Bilaterally, No significant wheezing  Heart:- S1 S2 regular  Abdomen:- Soft, Positive bowel sounds,   Neurologic: - AOX3, No acute FND,  Skin: - No acute rashes  Musculoskeletal: Rt knee in dressing  Psych: - Appropriate mood    LAB  Recent Results (from the past 24 hour(s))   GLUCOSE, POC    Collection Time: 08/23/18  9:46 AM   Result Value Ref Range    Glucose (POC) 89 65 - 100 mg/dL   TYPE & SCREEN    Collection Time: 08/23/18  9:51 AM   Result Value Ref Range    Crossmatch Expiration 08/26/2018     ABO/Rh(D) B POSITIVE     Antibody screen NEG    PTT    Collection Time: 08/23/18  9:51 AM   Result Value Ref Range    aPTT 30.1 23.2 - 35.3 SEC       IMAGING:     No results found.       Assessment/Plan     Active Problems:    Osteoarthritis (12/1/2015)    This is a 80-year-old female with a past medical history of coronary artery disease, atrial septal aneurysm, hyperlipidemia, history of breast cancer, hypertension, paroxysmal A. fib comes into the hospital for right total knee arthroplasty which is done on 08/23/2018    Plan:  Patient's blood pressure and heart rate is currently controlled, will monitor closely  Patient is on Eliquis 5 mg twice a day at home which can be resumed once cleared by orthopedic service, but currently patient is started on Coumadin, monitor INR, continue with amiodarone 200 mg once daily,  Will hold off on benazepril for now because of concern with the renal issues, will check a BMP in the morning and will decide about resuming tomorrow, order IV hydralazine as needed  Continue with the Crestor, fenofibrate which he takes at home  PT/OT, pain management as per Ortho service    On [address], I would like to thank Ortho service for allowing to see this patient  Will reevaluate this patient    Wilfredo Kaplan MD  August 23, 2018

## 2018-08-23 NOTE — PROGRESS NOTES
08/23/18 1600   Oxygen Therapy   O2 Sat (%) 98 %   Pulse via Oximetry 63 beats per minute   O2 Device Room air   O2 Flow Rate (L/min) 0 l/min   Patient encouraged to do IS every hour while awake-patient agreed and demonstrated. No shortness of breath or distress noted. BS are clear b/l.    [facility] notes reviewed

## 2018-08-23 NOTE — PERIOP NOTES
Teach back method used in review of Incentive Spirometer TOTAL TIDAL VOLUME 2000 ML OBSERVED, Hibiclens usage preop, TB screening and pain management goals.

## 2018-08-23 NOTE — ANESTHESIA POSTPROCEDURE EVALUATION
Post-Anesthesia Evaluation and Assessment    Patient: Maris Macias MRN: [id number]  SSN: [id number]    YOB: 1940  Age: 70 y.o. Sex: female       Cardiovascular Function/Vital Signs  Visit Vitals    /65 (BP 1 Location: Right arm, BP Patient Position: At rest)    Pulse 60    Temp 36.2 °C (97.2 °F)    Resp 20    Ht 5' 5\" (1.651 m)    Wt 79.1 kg (174 lb 4.8 oz)    SpO2 97%    BMI 29.01 kg/m2       Patient is status post spinal, regional anesthesia for Procedure(s):  RIGHT KNEE ARTHROPLASTY TOTAL/DEPUY. Nausea/Vomiting: None    Postoperative hydration reviewed and adequate. Pain:  Pain Scale 1: Numeric (0 - 10) (08/23/18 1353)  Pain Intensity 1: 0 (08/23/18 1353)   Managed    Neurological Status:   Neuro (WDL): Exceptions to WDL (08/23/18 1323)  Neuro  Neurologic State: Drowsy (08/23/18 1323)  LUE Motor Response: Purposeful (08/23/18 1323)  LLE Motor Response: Numbness; Pharmacologically paralyzed (08/23/18 1323)  RUE Motor Response: Purposeful (08/23/18 1323)  RLE Motor Response: Numbness; Pharmacologically paralyzed (08/23/18 1323)   At baseline    Mental Status and Level of Consciousness: Arousable    Pulmonary Status:   O2 Device: Nasal cannula (08/23/18 1353)   Adequate oxygenation and airway patent    Complications related to anesthesia: None    Post-anesthesia assessment completed.  No concerns    Signed By: Emely Julien MD     August 23, 2018

## 2018-08-23 NOTE — PROGRESS NOTES
Problem: Self Care Deficits Care Plan (Adult)  Goal: *Acute Goals and Plan of Care (Insert Text)  GOALS:   DISCHARGE GOALS (in preparation for going home/rehab):  3 days  1. Ms. John Paul Middleton will perform one lower body dressing activity with minimal assistance required to demonstrate improved functional mobility and safety. 2.  Ms. John Paul Middleton will perform one lower body bathing activity with minimal assistance required to demonstrate improved functional mobility and safety. 3.  Ms. John Paul Middleton will perform toileting/toilet transfer with contact guard assistance to demonstrate improved functional mobility and safety. 4.  Ms. John Paul Middleton will perform shower transfer with contact guard assistance to demonstrate improved functional mobility and safety. JOINT CAMP OCCUPATIONAL THERAPY TKA: Initial Assessment 8/23/2018  INPATIENT: Hospital Day: 1  [place] / Product Type: Medicare /      NAME/AGE/GENDER: Pan Glass is a 70 y.o. female   PRIMARY DIAGNOSIS:  Primary osteoarthritis of right knee [M17.11]   Procedure(s) and Anesthesia Type:     * RIGHT KNEE ARTHROPLASTY TOTAL/DEPUY - Spinal (Right)  ICD-10: Treatment Diagnosis:    · Pain in Right Knee (M25.561)  · Stiffness of Right Knee, Not elsewhere classified (E48.247)      ASSESSMENT:     Ms. John Paul Middleton is s/p Right TKA and presents with decreased weight bearing on R LE and decreased independence with functional mobility and activities of daily living as compared to baseline level of function and safety. Patient would benefit from skilled Occupational Therapy to maximize independence and safety with self-care task and functional mobility. Pt would also benefit from education on adaptive equipment and safety precautions in preparation for going home or for recommendations for post-hospital rehab program.  Last time patient went to [facility] in [facility].  Patient did very well during evaluation, transferring from bed to recliner with therapies using a RW. This section established at most recent assessment   PROBLEM LIST (Impairments causing functional limitations):  1. Decreased Strength  2. Decreased ADL/Functional Activities  3. Decreased Transfer Abilities  4. Increased Pain  5. Increased Fatigue  6. Decreased Flexibility/Joint Mobility  7. Decreased Knowledge of Precautions   INTERVENTIONS PLANNED: (Benefits and precautions of occupational therapy have been discussed with the patient.)  1. Activities of daily living training  2. Adaptive equipment training  3. Balance training  4. Clothing management  5. Donning&doffing training  6. Theraputic activity     TREATMENT PLAN: Frequency/Duration: Follow patient 1-3tx to address above goals. Rehabilitation Potential For Stated Goals: Excellent     RECOMMENDED REHABILITATION/EQUIPMENT: (at time of discharge pending progress): Continue Skilled Therapy. OCCUPATIONAL PROFILE AND HISTORY:   History of Present Injury/Illness (Reason for Referral): Pt presents this date s/p (Right) TKA. Past Medical History/Comorbidities:   Ms. Aries Cárdenas  has a past medical history of Atrial septal aneurysm; CAD (coronary artery disease); Dyslipidemia; Heart murmur; History of breast cancer (1990); Hypertension; Osteoarthritis; Paroxysmal atrial fibrillation ([address] Patent foramen ovale; and Valvular heart disease. She also has no past medical history of Adverse effect of anesthesia; Asthma; Autoimmune disease ([address] Chronic kidney disease; Chronic obstructive pulmonary disease ([address] Chronic pain; Coagulation disorder ([address] Diabetes ([address] Difficult intubation; GERD (gastroesophageal reflux disease); Heart failure ([address] Ill-defined condition; Liver disease; Malignant hyperthermia due to anesthesia; Morbid obesity ([address] Nausea & vomiting; Pseudocholinesterase deficiency; Psychiatric disorder; PUD (peptic ulcer disease); Seizures ([address] Stroke [facility]); Thromboembolus ([address] Thyroid disease;  Unspecified adverse effect of anesthesia; or Unspecified sleep apnea. Ms. Clau Garner  has a past surgical history that includes hx mastectomy (Left, ); hx hysterectomy (); hx  section; hx cholecystectomy (); hx cataract removal (Bilateral); hx back surgery; hx rotator cuff repair (Left, ); and hx other surgical (2016). Social History/Living Environment:   Home Environment: Private residence  # Steps to Enter: 0  One/Two Story Residence: One story  Living Alone: Yes  Support Systems: Family member(s)  Patient Expects to be Discharged to[de-identified] Rehabilitation facility  Current DME Used/Available at Home: Cane, straight, Raised toilet seat, Walker, rolling  Tub or Shower Type: Shower  Prior Level of Function/Work/Activity:  Independent prior. Number of Personal Factors/Comorbidities that affect the Plan of Care: Brief history (0):  LOW COMPLEXITY   ASSESSMENT OF OCCUPATIONAL PERFORMANCE[de-identified]   Most Recent Physical Functioning:   Balance  Sitting: Intact  Standing: With support       Gross Assessment: (P) Yes  Gross Assessment  AROM: (P) Within functional limits (except right lower extremity, s/p TKA)  Strength: (P) Within functional limits (except right lower extremity, s/p TKA)            Coordination  Fine Motor Skills-Upper: Left Intact; Right Intact  Gross Motor Skills-Upper: Left Intact; Right Intact         Mental Status  Neurologic State: Alert  Orientation Level: Oriented X4  Cognition: Appropriate decision making  Perception: Appears intact  Perseveration: No perseveration noted                Basic ADLs (From Assessment) Complex ADLs (From Assessment)   Basic ADL  Feeding: Independent  Oral Facial Hygiene/Grooming: Setup  Bathing: Minimum assistance  Upper Body Dressing: Setup  Lower Body Dressing: Maximum assistance  Toileting: Moderate assistance     Grooming/Bathing/Dressing Activities of Daily Living                       Functional Transfers  Toilet Transfer : Moderate assistance  Shower Transfer:  Moderate assistance     Bed/Mat Mobility  Supine to Sit: Contact guard assistance  Sit to Stand: Contact guard assistance  Bed to Chair: Minimum assistance         Physical Skills Involved:  1. Range of Motion  2. Balance  3. Strength  4. Activity Tolerance Cognitive Skills Affected (resulting in the inability to perform in a timely and safe manner): 1. wfl Psychosocial Skills Affected: 1. wfl   Number of elements that affect the Plan of Care: 1-3:  LOW COMPLEXITY   CLINICAL DECISION MAKING:   [facility] AM-PAC 6 Clicks   Daily Activity Inpatient Short Form  How much help from another person does the patient currently need. .. Total A Lot A Little None   1. Putting on and taking off regular lower body clothing? [] 1   [x] 2   [] 3   [] 4   2. Bathing (including washing, rinsing, drying)? [] 1   [x] 2   [] 3   [] 4   3. Toileting, which includes using toilet, bedpan or urinal?   [] 1   [x] 2   [] 3   [] 4   4. Putting on and taking off regular upper body clothing? [] 1   [] 2   [] 3   [x] 4   5. Taking care of personal grooming such as brushing teeth? [] 1   [] 2   [] 3   [x] 4   6. Eating meals? [] 1   [] 2   [] 3   [x] 4   © 2007, Trustees of [facility], under license to [employer]. All rights reserved     Score:  Initial: 18 Most Recent: X (Date: -- )    Interpretation of Tool:  Represents activities that are increasingly more difficult (i.e. Bed mobility, Transfers, Gait). Score 24 23 22-20 19-15 14-10 9-7 6     Modifier CH CI CJ CK CL CM CN      ? Self Care:     - CURRENT STATUS: CK - 40%-59% impaired, limited or restricted    - GOAL STATUS: CJ - 20%-39% impaired, limited or restricted    - D/C STATUS:  ---------------To be determined---------------  [place] / Product Type: Medicare /      Medical Necessity:     · Skilled intervention continues to be required due to Deficits noted above.   Reason for Services/Other Comments:  · Patient continues to require skilled intervention due to new TKA. Use of outcome tool(s) and clinical judgement create a POC that gives a: MODERATE COMPLEXITY            TREATMENT:   (In addition to Assessment/Re-Assessment sessions the following treatments were rendered)     Pre-treatment Symptoms/Complaints:    Pain: Initial:   Pain Intensity 1: 0  Post Session:  0     Assessment/Reassessment only, no treatment provided today    Treatment/Session Assessment:     Response to Treatment:  Good, sitting up in recliner. Education:  [] Home Exercises  [x] Fall Precautions  [] Hip Precautions [] Going Home Video  [x] Knee/Hip Prosthesis Review  [x] Walker Management/Safety [x] Adaptive Equipment as Needed       Interdisciplinary Collaboration:   o Physical Therapist  o Occupational Therapist  o Registered Nurse    After treatment position/precautions:   o Up in chair  o Bed/Chair-wheels locked  o Caregiver at bedside  o Call light within reach  o RN notified     Compliance with Program/Exercises: compliant all of the time. Recommendations/Intent for next treatment session:  Treatment next visit will focus on increasing Ms. Gary's independence with bed mobility, transfers, self care, functional mobility, modalities for pain, and patient education.       Total Treatment Duration:  OT Patient Time In/Time Out  Time In: 1540  Time Out: [address] [facility],

## 2018-08-23 NOTE — PROGRESS NOTES
TRANSFER - IN REPORT:    Verbal report received from Nida Barthel) on Claryce Dines  being received from [facility]) for routine progression of care      Report consisted of patients Situation, Background, Assessment and   Recommendations(SBAR). Information from the following report(s) Kardex, Procedure Summary, Intake/Output, MAR and Recent Results was reviewed with the receiving nurse. Opportunity for questions and clarification was provided. Assessment completed upon patients arrival to unit and care assumed.          2

## 2018-08-23 NOTE — PROGRESS NOTES
Problem: Mobility Impaired (Adult and Pediatric)  Goal: *Acute Goals and Plan of Care (Insert Text)  GOALS (1-4 days):  (1.)Ms. Arnie Schwartz will move from supine to sit and sit to supine  in bed with STAND BY ASSIST.  (2.)Ms. Arnie Schwartz will transfer from bed to chair and chair to bed with STAND BY ASSIST using the least restrictive device. (3.)Ms. Arnie Schwartz will ambulate with STAND BY ASSIST for 250 feet with the least restrictive device. (4.)Ms. Arnie Schwartz will ambulate up/down 3 steps with bilateral  railing with CONTACT GUARD ASSIST with no device. (5.)Ms. Arnie Schwartz will increase right knee ROM to 5°-80°.  ________________________________________________________________________________________________      PHYSICAL THERAPY Joint camp tKa: Initial Assessment, Treatment Day: Day of Assessment, PM 8/23/2018  INPATIENT: Hospital Day: 1  [place] / Product Type: Medicare /      NAME/AGE/GENDER: Brinda Graves is a 70 y.o. female   PRIMARY DIAGNOSIS:  Primary osteoarthritis of right knee [M17.11]   Procedure(s) and Anesthesia Type:     * RIGHT KNEE ARTHROPLASTY TOTAL/DEPUY - Spinal (Right)  ICD-10: Treatment Diagnosis:    · Pain in Right Knee (M25.561)  · Stiffness of Right Knee, Not elsewhere classified (M25.661)  · Difficulty in walking, Not elsewhere classified (R26.2)      ASSESSMENT:     Ms. Arnie Schwartz presents with decreased strength and range of motion right lower extremity and decreased independence with functional mobility s/p right TKA. She will benefit from skilled PT interventions to maximize independence with functional mobility and with TKA exercises. She had her left knee done about 3 years ago and went to [facility] from the hospital and she hopes to do the same again this admission. She did well with assessment, did not walk far still had some tingling in her feet. Once settled in chair we worked on some TKA exercises with verbal cues.  Pt stayed in chair with ice in place and needs in reach. Instructed to not get up without assist. Celesta More to progress tomorrow. This section established at most recent assessment   PROBLEM LIST (Impairments causing functional limitations):  1. Decreased Strength  2. Decreased ADL/Functional Activities  3. Decreased Transfer Abilities  4. Decreased Ambulation Ability/Technique  5. Decreased Flexibility/Joint Mobility  6. Edema/Girth  7. Decreased Brooke with Home Exercise Program   INTERVENTIONS PLANNED: (Benefits and precautions of physical therapy have been discussed with the patient.)  1. Bed Mobility  2. Cold  3. Gait Training  4. Home Exercise Program (HEP)  5. Therapeutic Exercise/Strengthening  6. Transfer Training  7. Range of Motion: active/assisted/passive  8. Therapeutic Activities  9. Group Therapy     TREATMENT PLAN: Frequency/Duration: Follow patient BID for duration of hospital stay to address above goals. Rehabilitation Potential For Stated Goals: Good     RECOMMENDED REHABILITATION/EQUIPMENT: (at time of discharge pending progress): Continue Skilled Therapy and pt wants to pursue rehab. HISTORY:   History of Present Injury/Illness (Reason for Referral):  Pt s/p right TKA on 8/23/18  Past Medical History/Comorbidities:   Ms. Arnie Schwartz  has a past medical history of Atrial septal aneurysm; CAD (coronary artery disease); Dyslipidemia; Heart murmur; History of breast cancer (1990); Hypertension; Osteoarthritis; Paroxysmal atrial fibrillation ([address] Patent foramen ovale; and Valvular heart disease. She also has no past medical history of Adverse effect of anesthesia; Asthma; Autoimmune disease ([address] Chronic kidney disease; Chronic obstructive pulmonary disease ([address] Chronic pain; Coagulation disorder ([address] Diabetes ([address] Difficult intubation; GERD (gastroesophageal reflux disease); Heart failure ([address] Ill-defined condition; Liver disease; Malignant hyperthermia due to anesthesia; Morbid obesity ([address]  Nausea & vomiting; Pseudocholinesterase deficiency; Psychiatric disorder; PUD (peptic ulcer disease); Seizures ([facility] Stroke [facility]); Thromboembolus ([facility] Thyroid disease; Unspecified adverse effect of anesthesia; or Unspecified sleep apnea. Ms. Cresencio Lugo  has a past surgical history that includes hx mastectomy (Left, ); hx hysterectomy (); hx  section; hx cholecystectomy (); hx cataract removal (Bilateral); hx back surgery; hx rotator cuff repair (Left, ); and hx other surgical (). Social History/Living Environment:   Home Environment: Private residence  # Steps to Enter: 0  One/Two Story Residence: One story  Living Alone: Yes  Support Systems: Family member(s)  Patient Expects to be Discharged to[de-identified] Rehabilitation facility  Current DME Used/Available at Home: Cane, straight, Raised toilet seat, Walker, rolling  Tub or Shower Type: Shower  Prior Level of Function/Work/Activity:  Pt living at home, independent with gait with a walker, independent with ADLs   Number of Personal Factors/Comorbidities that affect the Plan of Care: 0: LOW COMPLEXITY   EXAMINATION:   Most Recent Physical Functioning:   Gross Assessment: Yes  Gross Assessment  AROM: Within functional limits (except right lower extremity, s/p TKA)  Strength:  Within functional limits (except right lower extremity, s/p TKA)                     Bed Mobility  Supine to Sit: Contact guard assistance  Sit to Supine:  (stayed up in chair)    Transfers  Sit to Stand: Contact guard assistance  Stand to Sit: Contact guard assistance  Bed to Chair: Minimum assistance    Balance  Sitting: Intact  Standing: With support    Posture  Posture (WDL): Within defined limits         Weight Bearing Status  Right Side Weight Bearing: As tolerated  Distance (ft): 4 Feet (ft)  Ambulation - Level of Assistance: Minimal assistance  Assistive Device: Walker, rolling  Speed/Ariela: Pace decreased (<100 feet/min)  Step Length: Left shortened  Stance: Right decreased  Gait Abnormalities: Antalgic        Braces/Orthotics: none    Right Knee Cold  Type: Cryocuff  Patient Position: Sitting      Body Structures Involved:  1. Joints  2. Muscles Body Functions Affected:  1. Movement Related Activities and Participation Affected:  1. Mobility  2. Self Care   Number of elements that affect the Plan of Care: 4+: HIGH COMPLEXITY   CLINICAL PRESENTATION:   Presentation: Stable and uncomplicated: LOW COMPLEXITY   CLINICAL DECISION MAKING:   [facility] AM-PAC 6 Clicks   Basic Mobility Inpatient Short Form  How much difficulty does the patient currently have. .. Unable A Lot A Little None   1. Turning over in bed (including adjusting bedclothes, sheets and blankets)? [] 1   [] 2   [x] 3   [] 4   2. Sitting down on and standing up from a chair with arms ( e.g., wheelchair, bedside commode, etc.)   [] 1   [] 2   [x] 3   [] 4   3. Moving from lying on back to sitting on the side of the bed? [] 1   [] 2   [x] 3   [] 4   How much help from another person does the patient currently need. .. Total A Lot A Little None   4. Moving to and from a bed to a chair (including a wheelchair)? [] 1   [] 2   [x] 3   [] 4   5. Need to walk in hospital room? [] 1   [] 2   [x] 3   [] 4   6. Climbing 3-5 steps with a railing? [] 1   [] 2   [x] 3   [] 4   © 2007, Trustees of [facility], under license to [employer]. All rights reserved        Score:  Initial: 18 Most Recent: X (Date: -- )    Interpretation of Tool:  Represents activities that are increasingly more difficult (i.e. Bed mobility, Transfers, Gait). Score 24 23 22-20 19-15 14-10 9-7 6     Modifier CH CI CJ CK CL CM CN      ?  Mobility - Walking and Moving Around:     - CURRENT STATUS: CK - 40%-59% impaired, limited or restricted    - GOAL STATUS: CI - 1%-19% impaired, limited or restricted    - D/C STATUS:  ---------------To be determined---------------  [place] / Product Type: Medicare /      Medical Necessity:     · Patient is expected to demonstrate progress in strength, range of motion and functional technique to decrease assistance required with functional mobility and TKA exercises. Reason for Services/Other Comments:  · Patient continues to require skilled intervention due to inability to complete functional mobility and TKA exercises. Use of outcome tool(s) and clinical judgement create a POC that gives a: Clear prediction of patient's progress: LOW COMPLEXITY            TREATMENT:   (In addition to Assessment/Re-Assessment sessions the following treatments were rendered)     Pre-treatment Symptoms/Complaints:  none  Pain Initial:   Pain Intensity 1: 0  Post Session:  0/10     Therapeutic Exercise: (8 Minutes):  Exercises per grid below to improve mobility and strength. Required minimal verbal cues to promote proper body alignment and promote proper body posture. Progressed repetitions as indicated. Date:  8/23/18 Date:   Date:     ACTIVITY/EXERCISE AM PM AM PM AM PM   GROUP THERAPY  []  []  []  []  []  []   Ankle Pumps  10       Quad Sets  10       Gluteal Sets  10       Hip ABd/ADduction  10       Straight Leg Raises  10       Knee Slides  10       Short Arc Quads         Long Arc Quads         Chair Slides                  B = bilateral; AA = active assistive; A = active; P = passive      Treatment/Session Assessment:     Response to Treatment: tolerated well . Education:  [x] Home Exercises  [x] Fall Precautions   [] D/C Instruction Review  [] Knee Prosthesis Review  [] Walker Management/Safety [] Adaptive Equipment as Needed       Interdisciplinary Collaboration:   o Occupational Therapist  o Registered Nurse    After treatment position/precautions:   o Up in chair  o Bed/Chair-wheels locked  o Call light within reach    Compliance with Program/Exercises: compliant all of the time.     Recommendations/Intent for next treatment session:  Treatment next visit will focus on increasing Ms. Gary's independence with bed mobility, transfers, gait training, strength/ROM exercises, modalities for pain, and patient education.       Total Treatment Duration:  PT Patient Time In/Time Out  Time In: 1550  Time Out: [address], PT

## 2018-08-23 NOTE — PERIOP NOTES
TRANSFER - OUT REPORT:    Verbal report given to Maria E RN(name) on Zuleyka Rajan  being transferred to [facility](unit) for routine post - op       Report consisted of patients Situation, Background, Assessment and   Recommendations(SBAR). Information from the following report(s) SBAR, Kardex, OR Summary, Procedure Summary, Intake/Output and MAR was reviewed with the receiving nurse. Opportunity for questions and clarification was provided.       Patient transported with:   O2 @ 3 liters  [employer]

## 2018-08-23 NOTE — PROGRESS NOTES
LALA met with pt to discuss dc plans. Pt has already signed admission paperwork for [facility]. [facility] is expecting pt on Sunday. Pt aware of Sunday dc. Pt made aware to have transportation for 1pm Sunday. Pt agreeable and confirms she has transportation. No additional needs identified at this time.   Ivelisse Moffett

## 2018-08-23 NOTE — H&P (VIEW-ONLY)
Zuleyka Rajan  : (89 y.o.) Joint Jassi Villanueva at [address] [place] [address], [address] [facility]  [phone]       Physical Therapy Prehab Plan of Treatment and Evaluation Summary:2018    ICD-10: Treatment Diagnosis:   · Pain in Right Knee (M25.561)  · Stiffness of Right Knee, Not elsewhere classified (M25.661)  Precautions/Allergies:   Sulfa (sulfonamide antibiotics) and Tape [adhesive]  MEDICAL/REFERRING DIAGNOSIS:  Unilateral primary osteoarthritis, right knee [M17.11]  REFERRING PHYSICIAN: Berkley Kessler MD  DATE OF SURGERY: 18   Assessment:   Comments:  Scheduled for R TKA. Had L TKA in . Ambulates with rolling walker. Lives alone and is planning to go to [facility] at rehab. PROBLEM LIST (Impacting functional limitations):  Ms. Usman Rodriguez presents with the following right lower extremity(s) problems:  1. Transfers  2. Gait  3. Strength  4. Range of Motion  5. Home Exercise Program  6. Pain   INTERVENTIONS PLANNED:  1. Home Exercise Program  2. Educational Discussion     TREATMENT PLAN: Effective Dates: 2018 TO 2018. Frequency/Duration: Patient to continue to perform home exercise program at least twice per day up until her surgery. GOALS: (Goals have been discussed and agreed upon with patient.)  Discharge Goals: Time Frame: 1 Day  1. Patient will demonstrate independence with a home exercise program designed to increase functional technique and pain control to minimize functional deficits and optimize patient for total joint replacement. Rehabilitation Potential For Stated Goals: Good  Regarding Flor Padron therapy, I certify that the treatment plan above will be carried out by a therapist or under their direction.   Thank you for this referral,  Bartolome Colindres, PT               HISTORY:   Present Symptoms:  Pain Intensity 1: 6  Pain Location 1: Knee  Pain Orientation 1: Right   History of Present Injury/Illness (Reason for Referral):  Medical/Referring Diagnosis: Unilateral primary osteoarthritis, right knee [M17.11]   Past Medical History/Comorbidities:   Ms. Ruby Gordon  has a past medical history of Atrial septal aneurysm; CAD (coronary artery disease); Dyslipidemia; Heart murmur; History of breast cancer (); Hypertension; Osteoarthritis; Paroxysmal atrial fibrillation ([address] Patent foramen ovale; and Valvular heart disease. She also has no past medical history of Adverse effect of anesthesia; Asthma; Autoimmune disease ([address] Chronic kidney disease; Chronic obstructive pulmonary disease ([address] Chronic pain; Coagulation disorder ([address] Diabetes ([address] Difficult intubation; GERD (gastroesophageal reflux disease); Heart failure ([address] Ill-defined condition; Liver disease; Malignant hyperthermia due to anesthesia; Morbid obesity ([address] Nausea & vomiting; Pseudocholinesterase deficiency; Psychiatric disorder; PUD (peptic ulcer disease); Seizures ([facility] Stroke [facility]); Thromboembolus ([address] Thyroid disease; Unspecified adverse effect of anesthesia; or Unspecified sleep apnea. Ms. Ruby Gordon  has a past surgical history that includes hx mastectomy (Left, ); hx hysterectomy (); hx  section; hx cholecystectomy (); hx cataract removal (Bilateral); hx back surgery; hx rotator cuff repair (Left, ); and hx other surgical ().   Social History/Living Environment:   Home Environment: Private residence  # Steps to Enter: 0  One/Two Story Residence: One story  Living Alone: Yes  Support Systems: Friends \ neighbors  Patient Expects to be Discharged to[de-identified] Rehabilitation facility ([facility])  Current DME Used/Available at Home: Walker, rolling, Cane, straight, Raised toilet seat  Tub or Shower Type: Shower  Work/Activity:  retired  Dominant Side:  LEFT  Current Medications:  See Pre-assessment nursing note   Number of Personal Factors/Comorbidities that affect the Plan of Care: 1-2: MODERATE COMPLEXITY   EXAMINATION:   ADLs (Current Functional Status):   Ambulation:  [] Independent  [] Walk Indoors Only  [] Walk Outdoors  [x] Use Assistive Device  [] Use Wheelchair Only Dressing:  [x] Independent  Requires Assistance from Someone for:  [] Sock/Shoes  [] Pants  [] Everything   Bathing/Showering:   [x] Independent  [] Requires Assistance from Someone  [] Sponge Bath Only Household Activities:  [] Routine house and yard work  [x] Light Housework Only  [] None   Observation/Orthostatic Postural Assessment: Forward head, Rounded shoulders  ROM/Flexibility:   AROM: Generally decreased, functional                       RLE AROM  R Knee Flexion: 100  R Knee Extension: 0   Strength:   Strength: Generally decreased, functional                  Functional Mobility:         Stand to Sit: Modified independent, Additional time  Sit to Stand: Modified independent, Additional time  Distance (ft): 500 Feet (ft)  Ambulation - Level of Assistance: Modified independent; Additional time  Assistive Device: Walker, rolling  Speed/Ariela: Slow  Stance: Right decreased  Gait Abnormalities: Antalgic;Decreased step clearance          Balance:    Sitting: Intact  Standing: With support   Body Structures Involved:  1. Bones  2. Joints  3. Muscles Body Functions Affected:  1. Neuromusculoskeletal  2. Movement Related Activities and Participation Affected:  1. General Tasks and Demands  2. Mobility  3. Self Care   Number of elements that affect the Plan of Care: 3: MODERATE COMPLEXITY   CLINICAL PRESENTATION:   Presentation: Stable and uncomplicated: LOW COMPLEXITY   CLINICAL DECISION MAKING:   Outcome Measure: Tool Used: Lower Extremity Functional Scale (LEFS)  Score:  Initial: 20/80 Most Recent: X/80 (Date: -- )   Interpretation of Score: 20 questions each scored on a 5 point scale with 0 representing \"extreme difficulty or unable to perform\" and 4 representing \"no difficulty\". The lower the score, the greater the functional disability.  80/80 represents no disability. Minimal detectable change is 9 points. Score 80 79-65 64-49 48-33 32-17 16-1 0   Modifier CH CI CJ CK CL CM CN     ? Mobility - Walking and Moving Around:     - CURRENT STATUS: CL - 60%-79% impaired, limited or restricted    - GOAL STATUS: CL - 60%-79% impaired, limited or restricted    - D/C STATUS:  CL - 60%-79% impaired, limited or restricted  Medical Necessity:   · Ms. Valery Perez is expected to optimize her lower extremity strength and ROM in preparation for joint replacement surgery. Reason for Services/Other Comments:  · Achieve baseline assesment of musculoskeletal system, functional mobility and home environment. , educate in PT HEP in preparation for surgery, educate in hospital plan of care. Use of outcome tool(s) and clinical judgement create a POC that gives a: Clear prediction of patient's progress: LOW COMPLEXITY   TREATMENT:   Treatment/Session Assessment:  Patient was instructed in PT- HEP to increase strength and ROM in LEs. Answered all questions. · Post session pain:  6  · Compliance with Program/Exercises: anticipate compliance.   Total Treatment Duration:  PT Patient Time In/Time Out  Time In: 1245  Time Out: [id number]

## 2018-08-23 NOTE — ANESTHESIA PREPROCEDURE EVALUATION
Anesthetic History               Review of Systems / Medical History  Patient summary reviewed and pertinent labs reviewed    Pulmonary                   Neuro/Psych              Cardiovascular    Hypertension  Valvular problems/murmurs (mild): mitral insufficiency and aortic stenosis      Dysrhythmias : atrial fibrillation  CAD (cath 2001, nonobstructive)      Comments: Echo: 7/2018:  · The left ventricular systolic function is normal (55-65%). · Indeterminate left ventricular diastolic function. · The left atrium is dilated. · The right atrium is dilated. · Possible small PFO with mild left to right shunt noted  · Mild aortic valve stenosis (mean Gradient 10 mm Hg)  · Mild mitral valve regurgitation.   · Moderate tricuspid valve regurgitation   GI/Hepatic/Renal                Endo/Other             Other Findings            Physical Exam    Airway  Mallampati: II  TM Distance: > 6 cm  Neck ROM: normal range of motion   Mouth opening: Normal     Cardiovascular          Murmur: Grade 3, Aortic area     Dental         Pulmonary  Breath sounds clear to auscultation               Abdominal         Other Findings            Anesthetic Plan    ASA: 3  Anesthesia type: spinal      Post-op pain plan if not by surgeon: peripheral nerve block single      Anesthetic plan and risks discussed with: Patient

## 2018-08-23 NOTE — ANESTHESIA PROCEDURE NOTES
Peripheral Block    Start time: 8/23/2018 11:04 AM  End time: 8/23/2018 11:11 AM  Performed by: Rich Kramer  Authorized by: Rich Kramer       Pre-procedure: Indications: at surgeon's request and post-op pain management    Preanesthetic Checklist: patient identified, risks and benefits discussed, site marked, timeout performed, anesthesia consent given and patient being monitored    Timeout Time: 11:04          Block Type:   Block Type:   Adductor canal  Laterality:  Right  Monitoring:  Continuous pulse ox, frequent vital sign checks, heart rate, oxygen and responsive to questions  Injection Technique:  Single shot  Procedures: ultrasound guided and nerve stimulator    Patient Position: supine  Prep: DuraPrep    Location:  Mid thigh  Needle Type:  Stimuplex  Needle Gauge:  20 G  Needle Localization:  Nerve stimulator and ultrasound guidance  Motor Response: minimal motor response >0.4 mA    Medication Injected:  0.2%  ropivacaine  Volume (mL):  20    Assessment:  Number of attempts:  1  Injection Assessment:  Incremental injection every 5 mL, local visualized surrounding nerve on ultrasound, negative aspiration for blood, no paresthesia, no intravascular symptoms and ultrasound image on chart  Patient tolerance:  Patient tolerated the procedure well with no immediate complications

## 2018-08-23 NOTE — OP NOTES
[facility]  Cemented Total Knee Arthroplasty  Patient:Veronika Stark   : 1940  Medical Record [id number]  Pre-operative Diagnosis:  Primary osteoarthritis of right knee [M17.11]  Post-operative Diagnosis: Primary osteoarthritis of right knee [M17.11]    Surgeon: Linda Kelly MD  Assistant: Valentino So, PA-C    Anesthesia: Spinal    Procedure: Total Knee Arthroplasty with use of Bone Cement  The complexity of the total joint surgery requires the use of a first assistant for positioning, retraction and assistance in closure. The patient's Body mass index is 29.01 kg/(m^2). , BMI's greater then 40 make surgical exposure and retraction extremely difficult and increase operative time. Tourniquet Time: none  EBL: 150cc  Additional Findings: Severe DJD  Releases none    Brinda Graves was brought to the operating room and positioned on the operating table. She was anethestized  A pérez catheter was placed preoperatively and IV antibiotics was administered. Prior to the incision being made a timeout was called identifying the patient, procedure ,operative side and surgeon. . The right leg was prepped and draped in the usual sterile manner  An anterior longitudinal incision was accomplished just medial to the tibial tubercle and extending approximal 6 centimeters proximal to the superior pole of the patella. A medial parapatellar capsular incision was performed. The medial capsular flap was elevated around to the insertion of the semimembranous tendon. The patella was everted and the knee flexed and externally rotated. The medial and external menisci were excised. The lateral half of the fat pad excised and the patella femoral ligament was released. The anterior cruciate ligament was resected and the posterior cruciate ligament was substituted. Using extramedullary instrumentation, the tibial cut was accomplished with appropriate posterior slope.   Approxiamately 2 mm of bone was removed from the low side of the tibia. The distal femur was next addressed. A drill hole was made above the intracondylar notch. Using appropriate intramedullary instrumentation,a 6 degree valgus distal cut was accomplished. A femur was sized. The anterior and posterior cuts were then made about the distal femur. The osteophytes were removed from the tibial and femoral surfaces. The flexion and extension gaps were assessed with the appropriate spacer blocks. Additional surgical procedures included none. The flexion and extension gaps were deemed appropriately balanced. The appropriate cutting blocks were then utilized to perform the anterior chamfer, posterior chamfer and notch cuts, with appropriate lateral tranlation accomplished for the patellofemoral groove. The tibia was sized. The tibial base plate was pinned into place with the appropriate external rotation and stem site prepared. A preliminary range of motion was accomplished with the above size trial components. A polyethylene insert allowed the patient to obtain full extension as well as appropriate flexion. The patient's ligaments were stable in flexion and extension to medial and lateral stressing and the alignment was through the appropriate mechanical axis. The patella was then everted. The bone was resected appropriately for a pegged patella button. A trial reduction revealed appropriate tracking through the patellofemoral groove. All trial components were removed and the cut surfaces prepared for cementing with irrigation and debridement of the bone interstices. There were no femoral deficiencies. There were no tibial deficiencies. No augmentation was utilized. The implants were cemented into position and pressurized in the usual fashion. Bone and cement debris were meticulously removed. The betadine lavage protocol was used.     [facility] knee was placed through range of motion and noted to be stable as mentioned above with the trail components. The wound was dry, therefore no drain was used. The operative knee was injected with 60cc of Naropin, 10 cc's of morphine and 1 cc of 30mg of Toradol. The capsular layer was closed using a #1 vicryl suture, while subcutaneous layers were closed using 2-0 Vicryl interrupted sutures. Finally the skin was closed using 3-0 Vicryl and skin staples, which were applied in occlusive fashion and sterile bandage applied. An Iceman cryo pad was applied on the operative leg. Sponge count and needle counts were correct. Arianna Mcneil left the operating room     Implants:   Implant Name Type Inv.  Item Serial No.  Lot No. LRB No. Used   CEMENT BNE HV R 40GM -- PALACOS R [id number] - [id number]  CEMENT BNE HV R 40GM -- Vyykn [id number] [place] [address] [id number] Right 2   PAT THEODORE DOME MEDIAL 38MM -- ATTUNE - [id number]  PAT THEODORE DOME MEDIAL 38MM -- ATTUNE [id number] [employer] [facility] [employer] [id number] Right 1   SIZE 5 CEMENTED TIBIAL BASE ROTATING PLATFORM   [id number] [employer]THO [id number] Right 1   FEM PS SZ 5 RT THEODORE -- ATTUNE - [id number]  FEM PS SZ 5 RT THEODORE -- ATTUNE [id number] [employer] [id number] Right 1   INSERT TIB PS RP SZ 5 7MM -- ATTUNE - [id number]   INSERT TIB PS RP SZ 5 7MM -- ATTUNE [id number] [employer] [facility] [employer] [id number] Right 1     Signed By: Oj Castro MD

## 2018-08-23 NOTE — PHYSICIAN ADVISORY
Letter of Determination: Inpatient Status Appropriate    This patient was originally hospitalized as Inpatient Status on 8/23/2018 for scheduled right total knee arthroplasty. This patient is appropriate for Inpatient Admission in accordance with CMS regulation Section 43 .3. Specifically, patient's stay is expected to be more than Two Midnights and was medically necessary. The patient's stay was medically necessary based on age > 72, atrial fibrillation, and hypertension. Consistent with CMS guidelines, patient meets for inpatient status. It is our recommendation that this patient's hospitalization status should be INPATIENT status.      The final decision regarding the patient's hospitalization status depends on the attending physician's judgement.     Jaqueline Howard MD, ANAND,   Physician [facility].

## 2018-08-24 PROBLEM — Z96.651 S/P TOTAL KNEE ARTHROPLASTY, RIGHT: Status: ACTIVE | Noted: 2018-08-24

## 2018-08-24 LAB
ANION GAP SERPL CALC-SCNC: 12 MMOL/L (ref 7–16)
BUN SERPL-MCNC: 22 MG/DL (ref 8–23)
CALCIUM SERPL-MCNC: 8.1 MG/DL (ref 8.3–10.4)
CHLORIDE SERPL-SCNC: 104 MMOL/L (ref 98–107)
CO2 SERPL-SCNC: 23 MMOL/L (ref 21–32)
CREAT SERPL-MCNC: 0.82 MG/DL (ref 0.6–1)
GLUCOSE SERPL-MCNC: 141 MG/DL (ref 65–100)
HGB BLD-MCNC: 11.2 G/DL (ref 11.7–15.4)
INR PPP: 1.1
POTASSIUM SERPL-SCNC: 4.4 MMOL/L (ref 3.5–5.1)
PROTHROMBIN TIME: 14.3 SEC (ref 11.5–14.5)
SODIUM SERPL-SCNC: 139 MMOL/L (ref 136–145)

## 2018-08-24 PROCEDURE — 65270000029 HC RM PRIVATE

## 2018-08-24 PROCEDURE — 99221 1ST HOSP IP/OBS SF/LOW 40: CPT | Performed by: PHYSICAL MEDICINE & REHABILITATION

## 2018-08-24 PROCEDURE — 97535 SELF CARE MNGMENT TRAINING: CPT

## 2018-08-24 PROCEDURE — 97116 GAIT TRAINING THERAPY: CPT

## 2018-08-24 PROCEDURE — 85018 HEMOGLOBIN: CPT

## 2018-08-24 PROCEDURE — 85610 PROTHROMBIN TIME: CPT

## 2018-08-24 PROCEDURE — 36415 COLL VENOUS BLD VENIPUNCTURE: CPT

## 2018-08-24 PROCEDURE — 74011250637 HC RX REV CODE- 250/637: Performed by: ORTHOPAEDIC SURGERY

## 2018-08-24 PROCEDURE — 80048 BASIC METABOLIC PNL TOTAL CA: CPT

## 2018-08-24 PROCEDURE — 97150 GROUP THERAPEUTIC PROCEDURES: CPT

## 2018-08-24 PROCEDURE — 74011250636 HC RX REV CODE- 250/636: Performed by: ORTHOPAEDIC SURGERY

## 2018-08-24 PROCEDURE — 97110 THERAPEUTIC EXERCISES: CPT

## 2018-08-24 RX ORDER — MAG HYDROX/ALUMINUM HYD/SIMETH 200-200-20
30 SUSPENSION, ORAL (FINAL DOSE FORM) ORAL
Status: DISCONTINUED | OUTPATIENT
Start: 2018-08-24 | End: 2018-08-26 | Stop reason: HOSPADM

## 2018-08-24 RX ORDER — BENAZEPRIL HYDROCHLORIDE 10 MG/1
20 TABLET ORAL DAILY
Status: DISCONTINUED | OUTPATIENT
Start: 2018-08-25 | End: 2018-08-26 | Stop reason: HOSPADM

## 2018-08-24 RX ADMIN — Medication 10 ML: at 06:07

## 2018-08-24 RX ADMIN — ACETAMINOPHEN 1000 MG: 500 TABLET, FILM COATED ORAL at 06:06

## 2018-08-24 RX ADMIN — ASPIRIN 81 MG: 81 TABLET, DELAYED RELEASE ORAL at 08:53

## 2018-08-24 RX ADMIN — OXYCODONE HYDROCHLORIDE 5 MG: 5 TABLET ORAL at 17:31

## 2018-08-24 RX ADMIN — CELECOXIB 200 MG: 200 CAPSULE ORAL at 08:53

## 2018-08-24 RX ADMIN — OXYCODONE HYDROCHLORIDE 10 MG: 5 TABLET ORAL at 09:57

## 2018-08-24 RX ADMIN — ACETAMINOPHEN 1000 MG: 500 TABLET, FILM COATED ORAL at 17:31

## 2018-08-24 RX ADMIN — DOCUSATE SODIUM AND SENNOSIDES 2 TABLET: 8.6; 5 TABLET, FILM COATED ORAL at 08:53

## 2018-08-24 RX ADMIN — Medication 10 ML: at 21:03

## 2018-08-24 RX ADMIN — Medication 2 G: at 06:06

## 2018-08-24 RX ADMIN — AMIODARONE HYDROCHLORIDE 200 MG: 200 TABLET ORAL at 08:53

## 2018-08-24 RX ADMIN — CELECOXIB 200 MG: 200 CAPSULE ORAL at 20:59

## 2018-08-24 RX ADMIN — WARFARIN SODIUM 5 MG: 5 TABLET ORAL at 17:31

## 2018-08-24 RX ADMIN — ENOXAPARIN SODIUM 30 MG: 30 INJECTION SUBCUTANEOUS at 08:53

## 2018-08-24 RX ADMIN — ROSUVASTATIN CALCIUM 10 MG: 5 TABLET, FILM COATED ORAL at 20:59

## 2018-08-24 RX ADMIN — ACETAMINOPHEN 1000 MG: 500 TABLET, FILM COATED ORAL at 11:41

## 2018-08-24 RX ADMIN — OXYCODONE HYDROCHLORIDE 10 MG: 5 TABLET ORAL at 21:02

## 2018-08-24 NOTE — PROGRESS NOTES
Problem: Self Care Deficits Care Plan (Adult)  Goal: *Acute Goals and Plan of Care (Insert Text)  GOALS:   DISCHARGE GOALS (in preparation for going home/rehab):  3 days  1. Ms. Lennard Boas will perform one lower body dressing activity with minimal assistance required to demonstrate improved functional mobility and safety. GOAL MET 8/24/2018 Upgrade to SBA. 2.  Ms. Lennard Boas will perform one lower body bathing activity with minimal assistance required to demonstrate improved functional mobility and safety. GOAL MET 8/24/2018  3. Ms. Lennard Boas will perform toileting/toilet transfer with contact guard assistance to demonstrate improved functional mobility and safety. GOAL MET 8/24/2018 Upgrade to supervision. 4.  Ms. Lennard Boas will perform shower transfer with contact guard assistance to demonstrate improved functional mobility and safety. GOAL MET 8/24/2018 upgrade to supervision. JOINT CAMP OCCUPATIONAL THERAPY TKA: Initial Assessment 8/24/2018  INPATIENT: Hospital Day: 2  [place] / Product Type: Medicare /      NAME/AGE/GENDER: Jesús Jiang is a 70 y.o. female   PRIMARY DIAGNOSIS:  Primary osteoarthritis of right knee [M17.11]   Procedure(s) and Anesthesia Type:     * RIGHT KNEE ARTHROPLASTY TOTAL/DEPUY - Spinal (Right)  ICD-10: Treatment Diagnosis:    · Pain in Right Knee (M25.561)  · Stiffness of Right Knee, Not elsewhere classified (W27.010)      ASSESSMENT:      Ms. Lennard Boas is s/p Right TKA and presents with decreased weight bearing on $ LE and decreased independence with functional mobility and activities of daily living. Patient completed shower and dressing as charter below in ADL grid and is ambulating with rolling walker and Contact guard to stand by assist.  Patient has met 4/4 goals, but 3 goals upgraded difficulty due to patient eventually returning home alone. Patient verbalized understanding of call light. Continue OT POC.           This section established at most recent assessment   PROBLEM LIST (Impairments causing functional limitations):  1. Decreased Strength  2. Decreased ADL/Functional Activities  3. Decreased Transfer Abilities  4. Increased Pain  5. Increased Fatigue  6. Decreased Flexibility/Joint Mobility  7. Decreased Knowledge of Precautions   INTERVENTIONS PLANNED: (Benefits and precautions of occupational therapy have been discussed with the patient.)  1. Activities of daily living training  2. Adaptive equipment training  3. Balance training  4. Clothing management  5. Donning&doffing training  6. Theraputic activity     TREATMENT PLAN: Frequency/Duration: Follow patient 1-3tx to address above goals. Rehabilitation Potential For Stated Goals: Excellent     RECOMMENDED REHABILITATION/EQUIPMENT: (at time of discharge pending progress): Continue Skilled Therapy. OCCUPATIONAL PROFILE AND HISTORY:   History of Present Injury/Illness (Reason for Referral): Pt presents this date s/p (Right) TKA. Past Medical History/Comorbidities:   Ms. Christian Boyle  has a past medical history of Atrial septal aneurysm; CAD (coronary artery disease); Dyslipidemia; Heart murmur; History of breast cancer (1990); Hypertension; Osteoarthritis; Paroxysmal atrial fibrillation ([address] Patent foramen ovale; and Valvular heart disease. She also has no past medical history of Adverse effect of anesthesia; Asthma; Autoimmune disease ([address] Chronic kidney disease; Chronic obstructive pulmonary disease ([address] Chronic pain; Coagulation disorder ([address] Diabetes ([address] Difficult intubation; GERD (gastroesophageal reflux disease); Heart failure ([address] Ill-defined condition; Liver disease; Malignant hyperthermia due to anesthesia; Morbid obesity ([address] Nausea & vomiting; Pseudocholinesterase deficiency; Psychiatric disorder; PUD (peptic ulcer disease); Seizures ([address] Stroke [facility]); Thromboembolus ([address] Thyroid disease;  Unspecified adverse effect of anesthesia; or Unspecified sleep apnea. Ms. Osmin Valadez  has a past surgical history that includes hx mastectomy (Left, ); hx hysterectomy (); hx  section; hx cholecystectomy (); hx cataract removal (Bilateral); hx back surgery; hx rotator cuff repair (Left, ); and hx other surgical (2016). Social History/Living Environment:   Home Environment: Private residence  # Steps to Enter: 0  One/Two Story Residence: One story  Living Alone: Yes  Support Systems: Family member(s)  Patient Expects to be Discharged to[de-identified] Rehabilitation facility  Current DME Used/Available at Home: Cane, straight, Raised toilet seat, Walker, rolling  Tub or Shower Type: Shower  Prior Level of Function/Work/Activity:  Independent prior. Number of Personal Factors/Comorbidities that affect the Plan of Care: Brief history (0):  LOW COMPLEXITY   ASSESSMENT OF OCCUPATIONAL PERFORMANCE[de-identified]   Most Recent Physical Functioning:   Balance  Sitting: Intact                    Coordination  Fine Motor Skills-Upper: Left Intact; Right Intact  Gross Motor Skills-Upper: Left Intact; Right Intact         Mental Status  Neurologic State: Alert  Orientation Level: Oriented X4                Basic ADLs (From Assessment) Complex ADLs (From Assessment)   Basic ADL  Feeding: Independent  Oral Facial Hygiene/Grooming: Independent  Bathing: Supervision  Type of Bath: Chlorhexidine (CHG), Full, Shower  Upper Body Dressing: Independent  Lower Body Dressing: Minimum assistance  Toileting: Contact guard assistance     Grooming/Bathing/Dressing Activities of Daily Living         Upper Body Bathing  Bathing Assistance: Independent Feeding  Feeding Assistance: Independent   Lower Body Bathing  Bathing Assistance: Supervision/set-up Toileting  Toileting Assistance: Stand-by assistance   Upper Body Dressing Assistance  Dressing Assistance: Independent Functional Transfers  Bathroom Mobility: Stand-by assistance  Toilet Transfer : Stand-by assistance  Shower Transfer: Contact guard assistance   Lower Body Dressing Assistance  Dressing Assistance: Minimum assistance Bed/Mat Mobility  Supine to Sit: Stand-by assistance  Sit to Stand: Stand-by assistance  Bed to Chair: Stand-by assistance         Physical Skills Involved:  1. Range of Motion  2. Balance  3. Strength  4. Activity Tolerance Cognitive Skills Affected (resulting in the inability to perform in a timely and safe manner): 1. wfl Psychosocial Skills Affected: 1. wfl   Number of elements that affect the Plan of Care: 1-3:  LOW COMPLEXITY   CLINICAL DECISION MAKIN[address] [place] [address] AM-PAC 6 Clicks   Daily Activity Inpatient Short Form  How much help from another person does the patient currently need. .. Total A Lot A Little None   1. Putting on and taking off regular lower body clothing? [] 1   [x] 2   [] 3   [] 4   2. Bathing (including washing, rinsing, drying)? [] 1   [x] 2   [] 3   [] 4   3. Toileting, which includes using toilet, bedpan or urinal?   [] 1   [x] 2   [] 3   [] 4   4. Putting on and taking off regular upper body clothing? [] 1   [] 2   [] 3   [x] 4   5. Taking care of personal grooming such as brushing teeth? [] 1   [] 2   [] 3   [x] 4   6. Eating meals? [] 1   [] 2   [] 3   [x] 4   © , Trustees of [address] [place] [address], under license to [employer]. All rights reserved     Score:  Initial: 18 Most Recent: X (Date: -- )    Interpretation of Tool:  Represents activities that are increasingly more difficult (i.e. Bed mobility, Transfers, Gait). Score 24 23 22-20 19-15 14-10 9-7 6     Modifier CH CI CJ CK CL CM CN      ?  Self Care:     - CURRENT STATUS: CK - 40%-59% impaired, limited or restricted    - GOAL STATUS: CJ - 20%-39% impaired, limited or restricted    - D/C STATUS:  ---------------To be determined---------------  [place] / [place] / Product Type: Medicare /      Medical Necessity:     · Skilled intervention continues to be required due to Deficits noted above. Reason for Services/Other Comments:  · Patient continues to require skilled intervention due to new TKA. Use of outcome tool(s) and clinical judgement create a POC that gives a: MODERATE COMPLEXITY            TREATMENT:   (In addition to Assessment/Re-Assessment sessions the following treatments were rendered)     Pre-treatment Symptoms/Complaints:    Pain: Initial:   Pain Intensity 1: 5  Post Session:  0     Self Care: (45): Procedure(s) (per grid) utilized to improve and/or restore self-care/home management as related to dressing, bathing, toileting and grooming. Required minimal verbal and   cueing to facilitate activities of daily living skills. Treatment/Session Assessment:     Response to Treatment:  Good, sitting up in recliner. Education:  [] Home Exercises  [x] Fall Precautions  [] Hip Precautions [] Going Home Video  [x] Knee/Hip Prosthesis Review  [x] Walker Management/Safety [x] Adaptive Equipment as Needed       Interdisciplinary Collaboration:   o Physical Therapist  o Occupational Therapist  o Registered Nurse    After treatment position/precautions:   o Up in chair  o Bed/Chair-wheels locked  o Caregiver at bedside  o Call light within reach  o RN notified     Compliance with Program/Exercises: compliant all of the time. Recommendations/Intent for next treatment session:  Treatment next visit will focus on increasing Ms. Gary's independence with bed mobility, transfers, self care, functional mobility, modalities for pain, and patient education.       Total Treatment Duration:  OT Patient Time In/Time Out  Time In: 0719  Time Out: [address], OT

## 2018-08-24 NOTE — PROGRESS NOTES
Sitting up in bed. Appetite good. Aquacel to R knee with scant drainage. Iceman to knee. NV checks WDL. No complaints. Getting up to shower now with OT assisting.

## 2018-08-24 NOTE — PROGRESS NOTES
Patient in recliner. Oxycodone 5 mg for pain of 7/10 per patient request. Neurovascular status WDL. Positive dorsiflexion and plantar flexion. Call bell within reach. Side rails up x3. Bed low and locked. No distress noted.

## 2018-08-24 NOTE — PROGRESS NOTES
Orthopedic Joint Progress Note    2018  Admit Date: 2018  Admit Diagnosis: Primary osteoarthritis of right knee [M17.11]    1 Day Post-Op    Subjective:     Chari Shames awake and alert    Review of Systems: Pertinent items are noted in HPI. Objective:     PT/OT:     PATIENT MOBILITY    Bed Mobility  Supine to Sit: Contact guard assistance  Sit to Supine:  (stayed up in chair)  Transfers  Sit to Stand: Contact guard assistance  Stand to Sit: Contact guard assistance  Bed to Chair: Minimum assistance      Gait  Speed/Ariela: Pace decreased (<100 feet/min)  Step Length: Left shortened  Stance: Right decreased  Gait Abnormalities: Antalgic  Ambulation - Level of Assistance: Minimal assistance  Distance (ft): 4 Feet (ft)  Assistive Device: Walker, rolling   Weight Bearing Status  Right Side Weight Bearing: As tolerated        Vital Signs:    Blood pressure 108/64, pulse (!) 55, temperature 97.4 °F (36.3 °C), resp. rate 18, height 5' 5\" (1.651 m), weight 79.1 kg (174 lb 4.8 oz), SpO2 98 %, not currently breastfeeding.   Temp (24hrs), Av.5 °F (36.4 °C), Min:97.2 °F (36.2 °C), Max:97.8 °F (36.6 °C)      Pain Control:   Pain Assessment  Pain Scale 1: Visual  Pain Intensity 1: 2  Pain Onset 1: MONTHS  Pain Location 1: Knee  Pain Orientation 1: Right  Pain Description 1: Aching    Meds:  Current Facility-Administered Medications   Medication Dose Route Frequency    tuberculin injection 5 Units  5 Units IntraDERMal ONCE    amiodarone (CORDARONE) tablet 200 mg  200 mg Oral DAILY    rosuvastatin (CRESTOR) tablet 10 mg  10 mg Oral QHS    0.9% sodium chloride infusion  100 mL/hr IntraVENous CONTINUOUS    sodium chloride (NS) flush 5-10 mL  5-10 mL IntraVENous Q8H    sodium chloride (NS) flush 5-10 mL  5-10 mL IntraVENous PRN    acetaminophen (TYLENOL) tablet 1,000 mg  1,000 mg Oral Q6H    celecoxib (CELEBREX) capsule 200 mg  200 mg Oral Q12H    oxyCODONE IR (ROXICODONE) tablet 10 mg  10 mg Oral Q4H PRN    HYDROmorphone (PF) (DILAUDID) injection 1 mg  1 mg IntraVENous Q3H PRN    naloxone (NARCAN) injection 0.2-0.4 mg  0.2-0.4 mg IntraVENous Q10MIN PRN    dexamethasone (DECADRON) injection 10 mg  10 mg IntraVENous ONCE    promethazine (PHENERGAN) tablet 25 mg  25 mg Oral Q6H PRN    diphenhydrAMINE (BENADRYL) capsule 25 mg  25 mg Oral Q4H PRN    senna-docusate (PERICOLACE) 8.6-50 mg per tablet 2 Tab  2 Tab Oral DAILY    zolpidem (AMBIEN) tablet 5 mg  5 mg Oral QHS PRN    aspirin delayed-release tablet 81 mg  81 mg Oral DAILY    enoxaparin (LOVENOX) injection 30 mg  30 mg SubCUTAneous DAILY    warfarin (COUMADIN) tablet 5 mg  5 mg Oral QHS    ondansetron (ZOFRAN ODT) tablet 8 mg  8 mg Oral Q8H PRN    oxyCODONE IR (ROXICODONE) tablet 5 mg  5 mg Oral Q4H PRN    hydrALAZINE (APRESOLINE) 20 mg/mL injection 10 mg  10 mg IntraVENous Q4H PRN        LAB:    Lab Results   Component Value Date/Time    INR 1.1 08/24/2018 05:47 AM    INR 1.1 08/23/2018 06:55 PM    INR 1.3 08/06/2018 12:22 PM     Lab Results   Component Value Date/Time    HGB 11.2 (L) 08/24/2018 05:47 AM    HGB 12.7 08/23/2018 06:55 PM    HGB 13.5 08/06/2018 12:22 PM       Wound Abdomen Anterior (Active)   Number of days:1625       Wound Knee Left (Active)   Number of days:997       Wound Knee Right (Active)   DRESSING STATUS Clean, dry, and intact 8/24/2018  3:00 AM   DRESSING TYPE Aquacel 8/24/2018  3:00 AM   Number of days:1       Wound Knee Right (Active)   DRESSING STATUS Clean, dry, and intact 8/23/2018  1:23 PM   DRESSING TYPE Aquacel 8/23/2018  1:23 PM   Number of days:1             Physical Exam:  Calves soft/ neuro intact      Assessment:      Principal Problem:    S/P total knee arthroplasty, right (8/24/2018)    Active Problems:    Osteoarthritis (12/1/2015)         Plan:     Continue PT/OT/Rehab  Consult: Rehab team including PT, OT, recreational therapy, and    SNF    Patient Expects to be Discharged to[de-identified] Rehabilitation facility     Signed By: Blayne Harvey MD

## 2018-08-24 NOTE — PROGRESS NOTES
Hospitalist Consult Note      Patient: Mark Li               Sex: female             MRN: [id number]      YOB: 1940      Age:  70 y.o. Reason for Consult:  Medical Managment    HPI     This is a 81-year-old female with a past medical history of coronary artery disease, atrial septal aneurysm, hyperlipidemia, history of breast cancer, hypertension, paroxysmal A. fib comes into the hospital for right total knee arthroplasty which is done on 2018,       Patient is doing well ,  denies any Chest pain, SOB, Palpitations. Pain is controlled, No Nausea or vomiting        Physical Exam     Visit Vitals    /56 (BP 1 Location: Left arm, BP Patient Position: At rest)    Pulse (!) 55    Temp 98.1 °F (36.7 °C)    Resp 18    Ht 5' 5\" (1.651 m)    Wt 79.1 kg (174 lb 4.8 oz)    SpO2 98%    Breastfeeding No    BMI 29.01 kg/m2      Temp (24hrs), Av.6 °F (36.4 °C), Min:97.2 °F (36.2 °C), Max:98.1 °F (36.7 °C)    Oxygen Therapy  O2 Sat (%): 98 % (18 0700)  Pulse via Oximetry: 63 beats per minute (18 1600)  O2 Device: Room air (18 1600)  O2 Flow Rate (L/min): 0 l/min (18 1600)    Intake/Output Summary (Last 24 hours) at 18 0831  Last data filed at 18 7747   Gross per 24 hour   Intake             2430 ml   Output             1575 ml   Net              855 ml          General:- Conscious, No acute distress   Eyes:- No pallor/icterus    HENT- Oral Mucosa is Moist,   Neck:- Supple, No JVD  Lungs- CTA Bilaterally, No significant wheezing  Heart:- S1 S2 regular  Abdomen:- Soft, Positive bowel sounds,   Neurologic: - AOX3, No acute FND,  Skin: - No acute rashes  Musculoskeletal: Rt knee in dressing  Psych: - Appropriate mood    LAB  Recent Results (from the past 24 hour(s))   GLUCOSE, POC    Collection Time: 18  9:46 AM   Result Value Ref Range    Glucose (POC) 89 65 - 100 mg/dL   TYPE & SCREEN    Collection Time: 18  9:51 AM Result Value Ref Range    Crossmatch Expiration 08/26/2018     ABO/Rh(D) B POSITIVE     Antibody screen NEG    PTT    Collection Time: 08/23/18  9:51 AM   Result Value Ref Range    aPTT 30.1 23.2 - 35.3 SEC   PROTHROMBIN TIME + INR    Collection Time: 08/23/18  6:55 PM   Result Value Ref Range    Prothrombin time 14.3 11.5 - 14.5 sec    INR 1.1     HEMOGLOBIN    Collection Time: 08/23/18  6:55 PM   Result Value Ref Range    HGB 12.7 11.7 - 15.4 g/dL   HEMOGLOBIN    Collection Time: 08/24/18  5:47 AM   Result Value Ref Range    HGB 11.2 (L) 11.7 - 15.4 g/dL   PROTHROMBIN TIME + INR    Collection Time: 08/24/18  5:47 AM   Result Value Ref Range    Prothrombin time 14.3 11.5 - 14.5 sec    INR 1.1     METABOLIC PANEL, BASIC    Collection Time: 08/24/18  5:47 AM   Result Value Ref Range    Sodium 139 136 - 145 mmol/L    Potassium 4.4 3.5 - 5.1 mmol/L    Chloride 104 98 - 107 mmol/L    CO2 23 21 - 32 mmol/L    Anion gap 12 7 - 16 mmol/L    Glucose 141 (H) 65 - 100 mg/dL    BUN 22 8 - 23 MG/DL    Creatinine 0.82 0.6 - 1.0 MG/DL    GFR est AA >60 >60 ml/min/1.73m2    GFR est non-AA >60 >60 ml/min/1.73m2    Calcium 8.1 (L) 8.3 - 10.4 MG/DL       IMAGING:     No results found.       Assessment/Plan     Principal Problem:    S/P total knee arthroplasty, right (8/24/2018)    Active Problems:    Osteoarthritis (12/1/2015)    This is a 72-year-old female with a past medical history of coronary artery disease, atrial septal aneurysm, hyperlipidemia, history of breast cancer, hypertension, paroxysmal A. fib comes into the hospital for right total knee arthroplasty which is done on 08/23/2018    Plan:  Patient's blood pressure and heart rate is currently controlled,   Patient is on Eliquis 5 mg twice a day at home patient is started on Coumadin, monitor INR, continue with amiodarone 200 mg once daily,  Can resume benazepril , Cr stable, cont with IV hydralazine as needed  Continue with the Crestor, fenofibrate which he takes at home  PT/OT, pain management as per Ortho service    Patient is medically stable from our standpoint she can be discharged whenever she is cleared from orthopedic service, will sign off please call us with any questions    On Behalf of [facility], I would like to thank Ortho service for allowing to see this patient      Jorge Grossman MD  August 24, 2018

## 2018-08-24 NOTE — PROGRESS NOTES
Problem: Mobility Impaired (Adult and Pediatric)  Goal: *Acute Goals and Plan of Care (Insert Text)  GOALS (1-4 days):  (1.)Ms. Jeremy Abreu will move from supine to sit and sit to supine  in bed with STAND BY ASSIST.  (2.)Ms. Jeremy Abreu will transfer from bed to chair and chair to bed with STAND BY ASSIST using the least restrictive device. (3.)Ms. Jeremy Abreu will ambulate with STAND BY ASSIST for 250 feet with the least restrictive device. (4.)Ms. Jeremy Abreu will ambulate up/down 3 steps with bilateral  railing with CONTACT GUARD ASSIST with no device. (5.)Ms. Jeremy Abreu will increase right knee ROM to 5°-80°.  ________________________________________________________________________________________________      PHYSICAL THERAPY [facility]: Daily Note, Treatment Day: 1st, AM 8/24/2018  INPATIENT: Hospital Day: 2  [place] / Product Type: Medicare /      NAME/AGE/GENDER: Ermias Franco is a 70 y.o. female   PRIMARY DIAGNOSIS:  Primary osteoarthritis of right knee [M17.11]   Procedure(s) and Anesthesia Type:     * RIGHT KNEE ARTHROPLASTY TOTAL/DEPUY - Spinal (Right)  ICD-10: Treatment Diagnosis:    · Pain in Right Knee (M25.561)  · Stiffness of Right Knee, Not elsewhere classified (M25.661)  · Difficulty in walking, Not elsewhere classified (R26.2)      ASSESSMENT:     Ms. Jeremy Abreu presents up in chair on contact and already had her OT therapy session and dressed. Worked on gait training in the mejia with verbal cues. Pt has a swivel wheel walker at home so we worked with that and she did well and felt comfortable with this. Back in room in chair worked on TKA exercises with verbal cues progressing with repetitions. Pt stayed up in chair with ice in place and needs in reach. Hope to progress this afternoon. This section established at most recent assessment   PROBLEM LIST (Impairments causing functional limitations):  1. Decreased Strength  2.  Decreased ADL/Functional Activities  3. Decreased Transfer Abilities  4. Decreased Ambulation Ability/Technique  5. Decreased Flexibility/Joint Mobility  6. Edema/Girth  7. Decreased Slope with Home Exercise Program   INTERVENTIONS PLANNED: (Benefits and precautions of physical therapy have been discussed with the patient.)  1. Bed Mobility  2. Cold  3. Gait Training  4. Home Exercise Program (HEP)  5. Therapeutic Exercise/Strengthening  6. Transfer Training  7. Range of Motion: active/assisted/passive  8. Therapeutic Activities  9. Group Therapy     TREATMENT PLAN: Frequency/Duration: Follow patient BID for duration of hospital stay to address above goals. Rehabilitation Potential For Stated Goals: Good     RECOMMENDED REHABILITATION/EQUIPMENT: (at time of discharge pending progress): Continue Skilled Therapy and pt wants to pursue rehab. HISTORY:   History of Present Injury/Illness (Reason for Referral):  Pt s/p right TKA on 8/23/18  Past Medical History/Comorbidities:   Ms. Sonia church  has a past medical history of Atrial septal aneurysm; CAD (coronary artery disease); Dyslipidemia; Heart murmur; History of breast cancer (1990); Hypertension; Osteoarthritis; Paroxysmal atrial fibrillation ([address] Patent foramen ovale; and Valvular heart disease. She also has no past medical history of Adverse effect of anesthesia; Asthma; Autoimmune disease ([address] Chronic kidney disease; Chronic obstructive pulmonary disease ([address] Chronic pain; Coagulation disorder ([address] Diabetes ([address] Difficult intubation; GERD (gastroesophageal reflux disease); Heart failure ([address] Ill-defined condition; Liver disease; Malignant hyperthermia due to anesthesia; Morbid obesity ([address] Nausea & vomiting; Pseudocholinesterase deficiency; Psychiatric disorder; PUD (peptic ulcer disease); Seizures ([address] Stroke [facility]); Thromboembolus ([address] Thyroid disease; Unspecified adverse effect of anesthesia; or Unspecified sleep apnea.   Ms. Sonia church  has a past surgical history that includes hx mastectomy (Left, ); hx hysterectomy (); hx  section; hx cholecystectomy (); hx cataract removal (Bilateral); hx back surgery; hx rotator cuff repair (Left, ); and hx other surgical (2016). Social History/Living Environment:   Home Environment: Private residence  # Steps to Enter: 0  One/Two Story Residence: One story  Living Alone: Yes  Support Systems: Family member(s)  Patient Expects to be Discharged to[de-identified] Rehabilitation facility  Current DME Used/Available at Home: Cane, straight, Raised toilet seat, Walker, rolling  Tub or Shower Type: Shower  Prior Level of Function/Work/Activity:  Pt living at home, independent with gait with a walker, independent with ADLs   Number of Personal Factors/Comorbidities that affect the Plan of Care: 0: LOW COMPLEXITY   EXAMINATION:   Most Recent Physical Functioning:                            Bed Mobility  Supine to Sit: Stand-by assistance    Transfers  Sit to Stand: Supervision;Stand-by assistance  Stand to Sit: Supervision;Stand-by assistance  Bed to Chair: Stand-by assistance    Balance  Sitting: Intact  Standing: Intact; With support         Gait Training: Yes    Weight Bearing Status  Right Side Weight Bearing: As tolerated  Distance (ft): 60 Feet (ft)  Ambulation - Level of Assistance: Stand-by assistance;Contact guard assistance  Assistive Device: Walker, rolling  Speed/Ariela: Pace decreased (<100 feet/min)  Step Length: Left shortened  Stance: Right decreased  Gait Abnormalities: Antalgic  Interventions: Safety awareness training;Verbal cues     Braces/Orthotics: none    Right Knee Cold  Type: Cryocuff  Patient Position: Sitting      Body Structures Involved:  1. Joints  2. Muscles Body Functions Affected:  1. Movement Related Activities and Participation Affected:  1. Mobility  2.  Self Care   Number of elements that affect the Plan of Care: 4+: HIGH COMPLEXITY   CLINICAL PRESENTATION:   Presentation: Stable and uncomplicated: LOW COMPLEXITY   CLINICAL DECISION [facility] [id number] AM-PAC 6 Clicks   Basic Mobility Inpatient Short Form  How much difficulty does the patient currently have. .. Unable A Lot A Little None   1. Turning over in bed (including adjusting bedclothes, sheets and blankets)? [] 1   [] 2   [x] 3   [] 4   2. Sitting down on and standing up from a chair with arms ( e.g., wheelchair, bedside commode, etc.)   [] 1   [] 2   [x] 3   [] 4   3. Moving from lying on back to sitting on the side of the bed? [] 1   [] 2   [x] 3   [] 4   How much help from another person does the patient currently need. .. Total A Lot A Little None   4. Moving to and from a bed to a chair (including a wheelchair)? [] 1   [] 2   [x] 3   [] 4   5. Need to walk in hospital room? [] 1   [] 2   [x] 3   [] 4   6. Climbing 3-5 steps with a railing? [] 1   [] 2   [x] 3   [] 4   © , Trustees of [address] [place] [address] [address] [address], under license to [employer]. All rights reserved        Score:  Initial: 18 Most Recent: X (Date: -- )    Interpretation of Tool:  Represents activities that are increasingly more difficult (i.e. Bed mobility, Transfers, Gait). Score 24 23 22-20 19-15 14-10 9-7 6     Modifier CH CI CJ CK CL CM CN      ? Mobility - Walking and Moving Around:     - CURRENT STATUS: CK - 40%-59% impaired, limited or restricted    - GOAL STATUS: CI - 1%-19% impaired, limited or restricted    - D/C STATUS:  ---------------To be determined---------------  [place] / [place] / Product Type: Medicare /      Medical Necessity:     · Patient is expected to demonstrate progress in strength, range of motion and functional technique to decrease assistance required with functional mobility and TKA exercises. Reason for Services/Other Comments:  · Patient continues to require skilled intervention due to inability to complete functional mobility and TKA exercises.    Use of outcome tool(s) and clinical judgement create a POC that gives a: Clear prediction of patient's progress: LOW COMPLEXITY            TREATMENT:   (In addition to Assessment/Re-Assessment sessions the following treatments were rendered)     Pre-treatment Symptoms/Complaints: having some pain  Pain Initial:   Pain Intensity 1: 6  Post Session:  6/10     Therapeutic Exercise: (15 Minutes):  Exercises per grid below to improve mobility and strength. Required minimal verbal cues to promote proper body alignment and promote proper body posture. Progressed repetitions as indicated. Gait Training (8 Minutes):  Gait training to improve and/or restore physical functioning as related to mobility and strength. Ambulated 60 Feet (ft) with Stand-by assistance;Contact guard assistance using a Walker, rolling and minimal Safety awareness training;Verbal cues related to their stance phase and stride length to promote proper body alignment and promote proper body posture. Instruction in performance of walker use and gait sequencing to correct stance phase and stride length.           Date:  8/23/18 Date:  8/24/18 Date:     ACTIVITY/EXERCISE AM PM AM PM AM PM   GROUP THERAPY  []  []  []  []  []  []   Ankle Pumps  10 12      Quad Sets  10 12      Gluteal Sets  10 12      Hip ABd/ADduction  10 12      Straight Leg Raises  10 12      Knee Slides  10 12      Short Arc Quads         Long Arc Quads         Chair Slides                  B = bilateral; AA = active assistive; A = active; P = passive      Treatment/Session Assessment:     Response to Treatment: tolerated well, with some pain    Education:  [x] Home Exercises  [x] Fall Precautions   [] D/C Instruction Review  [] Knee Prosthesis Review  [x] Walker Management/Safety [] Adaptive Equipment as Needed       Interdisciplinary Collaboration:   o Occupational Therapist  o Registered Nurse    After treatment position/precautions:   o Up in chair  o Bed/Chair-wheels locked  o Call light within reach    Compliance with Program/Exercises: compliant all of the time. Recommendations/Intent for next treatment session:  Treatment next visit will focus on increasing Ms. Gary's independence with bed mobility, transfers, gait training, strength/ROM exercises, modalities for pain, and patient education.       Total Treatment Duration:  PT Patient Time In/Time Out  Time In: 0948  Time Out: [address] [place], [place]

## 2018-08-24 NOTE — CONSULTS
Physical Medicine & Rehabilitation Note-consult    Patient: Christofer Blanco MRN: [id number]  SSN: [id number]    YOB: 1940  Age: 70 y.o. Sex: female      Admit Date: 8/23/2018  Admitting Physician: Chloe Barton MD    Medical Decision Making/Plan/Recommend: Gait impairment s/p right total knee arthroplasty. Post op patient without unusual barrier to progress. Patient is progressing well, limited by pain, weakness. Patient plans for SNF discharge. Agree, patient will benefit from continued daily skilled rehabilitation efforts and regular medical and nursing care at SNF. Continue PT, OT for active/assisted/passive right TKA ROM, strengthening, mobility, transfers, gait training. Will follow progress. Chief Complaint : Gait dysfunction secondary to below.   Admit Diagnosis: Primary osteoarthritis of right knee [M17.11]  right total knee arthroplasty   8/23/2018  Pain  DVT risk  Post op hemorrhagic anemia  Patent foramen ovale  Osteoarthritis  Hypertension  Acute Rehab Dx:  Gait impairment  Debility    deconditioning  Mobility and ambulation deficits  Self Care/ADL deficits    Medical Dx:  Past Medical History:   Diagnosis Date    Atrial septal aneurysm     per cardiac note 3/15    CAD (coronary artery disease)     per cardiac note 3/15: non-obstructive by cath 2001    Dyslipidemia     managed with medication    Heart murmur     Echo 7/13/18, LVEF- 55-65%, mild aortic stenosis     History of breast cancer 1990    left; s/p mastectomy    Hypertension     managed with medication    Osteoarthritis     Paroxysmal atrial fibrillation ([facility] [address]     managed with medication    Patent foramen ovale     per cardiac note 3/15    Valvular heart disease     per cardiac note 3/15: mild MR     Subjective:       HPI: Christofer Blanco is a 70 y.o. female patient at [address] [place] [address] who was admitted on 8/23/2018  by Chloe Barton MD with below mentioned medical history, is being seen for Physical Medicine and Rehabilitation consult. Anitra Aquino had right knee pain and discomfort with walking and activity due to end stage DJD. The symptoms were not well controlled with conservative management and has limited the patient's function. Patientunderwent a right total knee arthroplasty per Dr. Severa Holiday, MD on 2018. The patient's post operative course has been uncomplicated. Patient's weight bearing status is to be WBAT RLE. Patient is starting to stand, taking steps with acute PT and OT. Patient still shows significant functional deficits due to right knee pain, decreased ROM and strength. We are consulted to assist with rehab needs and placement. Anitra Aquino is seen and examined today. Medical Records reviewed. Patient denies any other pre morbid functional deficits. Patient has been independent with ambulation, prior to admission, limited by right knee pain. Previous Functional Level: Pt living at home, independent with gait with a walker, independent with ADLs    Current Level of Function:   bed mobility - CGA, transfers - CGA, decreased balance , ambulation - 4' with RW and min A      Family History   Problem Relation Age of Onset    Stroke Mother     Stroke Father       Social History   Substance Use Topics    Smoking status: Never Smoker    Smokeless tobacco: Never Used    Alcohol use No     Past Surgical History:   Procedure Laterality Date    HX BACK SURGERY      X3 lower back sx; no hardware    HX CATARACT REMOVAL Bilateral     HX  SECTION      x2    HX CHOLECYSTECTOMY  2014    HX HYSTERECTOMY  1979    HX MASTECTOMY Left     HX OTHER SURGICAL  2016    kari-anal abcess surgery     HX ROTATOR CUFF REPAIR Left       Prior to Admission medications    Medication Sig Start Date End Date Taking? Authorizing Provider   mupirocin calcium (BACTROBAN) 2 % nasal ointment by Both Nostrils route two (2) times a day.    Yes Historical Provider   apixaban (ELIQUIS) 5 mg tablet Take 5 mg by mouth two (2) times a day. Yes Historical Provider   magnesium 250 mg tab Take 1 Tab by mouth nightly. Yes Historical Provider   multivitamin (ONE A DAY) tablet Take 1 Tab by mouth daily. Yes Historical Provider   acetaminophen (TYLENOL EXTRA STRENGTH) 500 mg tablet Take 500 mg by mouth every six (6) hours as needed for Pain. Yes Historical Provider   calcium-cholecalciferol, d3, 600-125 mg-unit tab Take 1 Tab by mouth daily. Yes Malick Horta MD   fenofibrate (TRICOR) 160 mg tablet Take 160 mg by mouth nightly. Yes Historical Provider   rosuvastatin (CRESTOR) 10 mg tablet Take 10 mg by mouth nightly. Yes Historical Provider   benazepril (LOTENSIN) 20 mg tablet Take 20 mg by mouth daily. Indications: HYPERTENSION   Yes Historical Provider   amiodarone (CORDARONE) 200 mg tablet Take 200 mg by mouth daily. Instructed to take DOS per Anesthesia guidelines. Indications: VENTRICULAR RATE CONTROL IN ATRIAL FIBRILLATION   Yes Historical Provider     Allergies   Allergen Reactions    Sulfa (Sulfonamide Antibiotics) Rash    Tape [Adhesive] Contact Dermatitis        Review of Systems: +right knee pain, +antalgic gait. Denies chest pain, shortness of breath, cough, headache, visual problems, abdominal pain, dysurea, calf pain. Pertinent positives are as noted in the medical records and unremarkable otherwise. Objective:     Vitals:  Blood pressure 126/56, pulse (!) 55, temperature 98.1 °F (36.7 °C), resp. rate 18, height 5' 5\" (1.651 m), weight 174 lb 4.8 oz (79.1 kg), SpO2 98 %, not currently breastfeeding. Temp (24hrs), Av.7 °F (36.5 °C), Min:97.2 °F (36.2 °C), Max:98.1 °F (36.7 °C)    BMI (calculated): 29 (18 1011)   Intake and Output:   1901 -  0700  In: 0982 [P.O.:630; I.V.:1800]  Out: 1575 [Urine:1425]    Physical Exam:   General: Alert and age appropriately oriented. No acute cardio respiratory distress.    HEENT: Normocephalic, no conjunctival pallor, no scleral icterus  Oral mucosa moist without cyanosis, no JVD   Lungs: Clear to auscultation bilaterally. Respiration even and unlabored   Heart: Regular rate and rhythm, S1, S2   No  murmurs, clicks, rub or gallops   Abdomen: Soft, non-tender, non-distended. Genitourinary: defered   Neuromuscular:      Grossly no focal motor deficits. Right knee extension strong  Right ankle dorsiflexion 5/5  Right ankle plantarflexion 5/5  No sensory deficits distally BLE to soft touch. Skin/extremity: No calf tenderness BLE. No rashes, no marginal erythema.                                                                                          Labs/Studies:  Recent Results (from the past 72 hour(s))   GLUCOSE, POC    Collection Time: 08/23/18  9:46 AM   Result Value Ref Range    Glucose (POC) 89 65 - 100 mg/dL   TYPE & SCREEN    Collection Time: 08/23/18  9:51 AM   Result Value Ref Range    Crossmatch Expiration 08/26/2018     ABO/Rh(D) B POSITIVE     Antibody screen NEG    PTT    Collection Time: 08/23/18  9:51 AM   Result Value Ref Range    aPTT 30.1 23.2 - 35.3 SEC   PROTHROMBIN TIME + INR    Collection Time: 08/23/18  6:55 PM   Result Value Ref Range    Prothrombin time 14.3 11.5 - 14.5 sec    INR 1.1     HEMOGLOBIN    Collection Time: 08/23/18  6:55 PM   Result Value Ref Range    HGB 12.7 11.7 - 15.4 g/dL   HEMOGLOBIN    Collection Time: 08/24/18  5:47 AM   Result Value Ref Range    HGB 11.2 (L) 11.7 - 15.4 g/dL   PROTHROMBIN TIME + INR    Collection Time: 08/24/18  5:47 AM   Result Value Ref Range    Prothrombin time 14.3 11.5 - 14.5 sec    INR 1.1     METABOLIC PANEL, BASIC    Collection Time: 08/24/18  5:47 AM   Result Value Ref Range    Sodium 139 136 - 145 mmol/L    Potassium 4.4 3.5 - 5.1 mmol/L    Chloride 104 98 - 107 mmol/L    CO2 23 21 - 32 mmol/L    Anion gap 12 7 - 16 mmol/L    Glucose 141 (H) 65 - 100 mg/dL    BUN 22 8 - 23 MG/DL    Creatinine 0.82 0.6 - 1.0 MG/DL GFR est AA >60 >60 ml/min/1.73m2    GFR est non-AA >60 >60 ml/min/1.73m2    Calcium 8.1 (L) 8.3 - 10.4 MG/DL       Functional Assessment:  Reviewed participation and progress in therapies  Gross Assessment  AROM: Within functional limits (except right lower extremity, s/p TKA) (08/23/18 1608)  Strength:  Within functional limits (except right lower extremity, s/p TKA) (08/23/18 1608)   Bed Mobility  Supine to Sit: Contact guard assistance (08/23/18 1608)  Sit to Supine:  (stayed up in chair) (08/23/18 1608)   Balance  Sitting: Intact (08/23/18 1608)  Standing: With support (08/23/18 1608)               Bed/Mat Mobility  Supine to Sit: Contact guard assistance (08/23/18 1608)  Sit to Supine:  (stayed up in chair) (08/23/18 1608)  Sit to Stand: Contact guard assistance (08/23/18 1608)  Bed to Chair: Minimum assistance (08/23/18 1540)     Ambulation:  Gait  Speed/Ariela: Pace decreased (<100 feet/min) (08/23/18 1608)  Step Length: Left shortened (08/23/18 1608)  Stance: Right decreased (08/23/18 1608)  Gait Abnormalities: Antalgic (08/23/18 1608)  Ambulation - Level of Assistance: Minimal assistance (08/23/18 1608)  Distance (ft): 4 Feet (ft) (08/23/18 1608)  Assistive Device: Walker, rolling (08/23/18 1608)    Impression/Plan:     Principal Problem:    S/P total knee arthroplasty, right (8/24/2018)    Active Problems:    Osteoarthritis (12/1/2015)        Current Facility-Administered Medications   Medication Dose Route Frequency Provider Last Rate Last Dose    amiodarone (CORDARONE) tablet 200 mg  200 mg Oral DAILY Oj Castro MD   200 mg at 08/24/18 0853    rosuvastatin (CRESTOR) tablet 10 mg  10 mg Oral QHS Oj Castro MD   10 mg at 08/23/18 2126    0.9% sodium chloride infusion  100 mL/hr IntraVENous CONTINUOUS jO Castro  mL/hr at 08/23/18 1722 100 mL/hr at 08/23/18 1722    sodium chloride (NS) flush 5-10 mL  5-10 mL IntraVENous Jolynn Cárdenas MD   10 mL at 08/24/18 6326    sodium chloride (NS) flush 5-10 mL  5-10 mL IntraVENous PRN Garrick Necessary, MD        acetaminophen (TYLENOL) tablet 1,000 mg  1,000 mg Oral Q6H Garrick Necessary, MD   1,000 mg at 08/24/18 0606    celecoxib (CELEBREX) capsule 200 mg  200 mg Oral Q12H Garrick Necessary, MD   200 mg at 08/24/18 0853    oxyCODONE IR (ROXICODONE) tablet 10 mg  10 mg Oral Q4H PRN Garrick Necessary, MD   10 mg at 08/24/18 0957    HYDROmorphone (PF) (DILAUDID) injection 1 mg  1 mg IntraVENous Q3H PRN Garrick Necessary, MD        naloxone [facility]) injection 0.2-0.4 mg  0.2-0.4 mg IntraVENous Q10MIN PRN Garrick Necessary, MD        dexamethasone (DECADRON) injection 10 mg  10 mg IntraVENous ONCE Garrick Necessary, MD        promethazine (PHENERGAN) tablet 25 mg  25 mg Oral Q6H PRN Garrick Necessary, MD        diphenhydrAMINE (BENADRYL) capsule 25 mg  25 mg Oral Q4H PRN Garrick Necessary, MD        senna-docusate (PERICOLACE) 8.6-50 mg per tablet 2 Tab  2 Tab Oral DAILY Garrick Necessary, MD   2 Tab at 08/24/18 0853    zolpidem (AMBIEN) tablet 5 mg  5 mg Oral QHS PRN Garrick Necessary, MD        aspirin delayed-release tablet 81 mg  81 mg Oral DAILY Garrick Necessary, MD   81 mg at 08/24/18 0853    enoxaparin (LOVENOX) injection 30 mg  30 mg SubCUTAneous DAILY Garrick Necessary, MD   30 mg at 08/24/18 8651    warfarin (COUMADIN) tablet 5 mg  5 mg Oral QHS Garrick Necessary, MD   5 mg at 08/23/18 2126    ondansetron (ZOFRAN ODT) tablet 8 mg  8 mg Oral Q8H PRN Garrick Necessary, MD        oxyCODONE IR (ROXICODONE) tablet 5 mg  5 mg Oral Q4H PRN Garrick Necessary, MD        hydrALAZINE (APRESOLINE) 20 mg/mL injection 10 mg  10 mg IntraVENous Q4H PRN Berlin Tsai MD            Recommendations: Recommend sub acute rehab at [facility]. Continue Acute Rehab Program.  Coordination of rehab/medical care. Counseling of Physical Medicine & Rehab care issues management.   Monitoring and management of rehab conditions per the plan of care/orders. Will follow along with you for PM&R issues and provide you follow up. Will follow with SW/ /Admissions Coordinators regarding insurance approvals and acceptance. Rehabilitation Management/ Medical Management: 1. Devices:Walkers, Type: Rolling Walker  2. Consult:Rehab team including PT, OT,  and . 3. Disposition Rehab-discussed with patient. 4. Thigh-high or knee-high ROULA's when out of bed. 5. DVT Prophylaxis - started on coumadin per ortho. Monitor INR. 6. Incentive spirometer Q1H while awake  7. Post op hemorrhagic anemia- monitor. 8. Activity: WBAT RLE  9. Planned Labs: CBC,BMP, INR  10. Pain Control:  Continue with scheduled tylenol, celebrex and  PRN meds. 11. Wound Care: Keep right TKA wound clean and dry and reinforce dressing PRN. May remove Aquacel 1 week post op ad replace with new one. Remove staples 12-14 post surgery, when incision appears appropriately closed and apply benzoin and 1/2\" steristrips. Follow up with ORTHO per instructions. Thank you for the opportunity to participate in the care of this patient.     Signed By: Yue Kruse MD

## 2018-08-24 NOTE — DISCHARGE SUMMARY
REHABILITATION DISCHARGE SUMMARY     Patient: Caryle Lazier MRN: [id number]  SSN: [id number]    YOB: 1940  Age: 70 y.o. Sex: female      Date: 8/24/2018  Admit Date: 8/23/2018  Discharge Date: 8/24/2018    Admitting Physician: Dina Osman MD   Primary Care Physician: Vanna Bashir MD     Admission Condition: good    Chief Complaint : Gait dysfunction secondary to below. Admit Diagnosis: Primary osteoarthritis of right knee [M17.11]  right total knee arthroplasty   8/23/2018  Pain  DVT risk  Post op hemorrhagic anemia  Patent foramen ovale  Osteoarthritis  Hypertension  Acute Rehab Dx:  Gait impairment  Debility    deconditioning  Mobility and ambulation deficits  Self Care/ADL deficits    HPI: Caryle Lazier is a 70 y.o. female patient at [address] [place] [address] who was admitted on 8/23/2018  by Dina Osman MD with below mentioned medical history, is being seen for Physical Medicine and Rehabilitation. Caryle Lazier had right knee pain and discomfort with walking and activity due to end stage DJD. The symptoms were not well controlled with conservative management and has limited the patient's function. Patientunderwent a right total knee arthroplasty per Dr. Dina Osman MD on 8/23/2018. The patient's post operative course has been uncomplicated. Patient's weight bearing status is to be WBAT RLE. Patient is starting to stand, taking steps with acute PT and OT. Patient still shows significant functional deficits due to right knee pain, decreased ROM and strength. We are consulted to assist with rehab needs and placement. Caryle Lazier is seen and examined today. Medical Records reviewed. Patient denies any other pre morbid functional deficits.   Patient has been independent with ambulation, prior to admission, limited by right knee pain.        Rehabiitation Course:   Functional  Level On Admission: Walk: Modified Independent  Functional Level At Discharge: Walk: Contact [id number]: Home Situation  Home Environment: Private residence (18 1608)  # Steps to Enter: 0 (18 160)  One/Two Story Residence: One story (18)  Living Alone: Yes (18 160)  Support Systems: Family member(s) (18 160)  Patient Expects to be Discharged to[de-identified] Rehabilitation facility (18 160)  Current DME Used/Available at Home: Cinderella Reaper, straight;Raised toilet seat;Walker, rolling (18 160)  Tub or Shower Type: Shower (18)     Past Medical History:   Diagnosis Date    Atrial septal aneurysm     per cardiac note 3/15    CAD (coronary artery disease)     per cardiac note 3/15: non-obstructive by cath     Dyslipidemia     managed with medication    Heart murmur     Echo 18, LVEF- 55-65%, mild aortic stenosis     History of breast cancer     left; s/p mastectomy    Hypertension     managed with medication    Osteoarthritis     Paroxysmal atrial fibrillation ([address]     managed with medication    Patent foramen ovale     per cardiac note 3/15    Valvular heart disease     per cardiac note 3/15: mild MR      Past Surgical History:   Procedure Laterality Date    HX BACK SURGERY      X3 lower back sx; no hardware    HX CATARACT REMOVAL Bilateral     HX  SECTION      x2    HX CHOLECYSTECTOMY  2014    HX HYSTERECTOMY  1979    HX MASTECTOMY Left     HX OTHER SURGICAL  2016    kari-anal abcess surgery     HX ROTATOR CUFF REPAIR Left       Family History   Problem Relation Age of Onset    Stroke Mother     Stroke Father       Social History   Substance Use Topics    Smoking status: Never Smoker    Smokeless tobacco: Never Used    Alcohol use No       Allergies   Allergen Reactions    Sulfa (Sulfonamide Antibiotics) Rash    Tape [Adhesive] Contact Dermatitis       Prior to Admission medications    Medication Sig Start Date End Date Taking?  Authorizing Provider   mupirocin calcium (BACTROBAN) 2 % nasal ointment by Both Nostrils route two (2) times a day. Yes Historical Provider   apixaban (ELIQUIS) 5 mg tablet Take 5 mg by mouth two (2) times a day. Yes Historical Provider   magnesium 250 mg tab Take 1 Tab by mouth nightly. Yes Historical Provider   multivitamin (ONE A DAY) tablet Take 1 Tab by mouth daily. Yes Historical Provider   acetaminophen (TYLENOL EXTRA STRENGTH) 500 mg tablet Take 500 mg by mouth every six (6) hours as needed for Pain. Yes Historical Provider   calcium-cholecalciferol, d3, 600-125 mg-unit tab Take 1 Tab by mouth daily. Yes Malick Horta MD   fenofibrate (TRICOR) 160 mg tablet Take 160 mg by mouth nightly. Yes Historical Provider   rosuvastatin (CRESTOR) 10 mg tablet Take 10 mg by mouth nightly. Yes Historical Provider   benazepril (LOTENSIN) 20 mg tablet Take 20 mg by mouth daily. Indications: HYPERTENSION   Yes Historical Provider   amiodarone (CORDARONE) 200 mg tablet Take 200 mg by mouth daily. Instructed to take DOS per Anesthesia guidelines.   Indications: VENTRICULAR RATE CONTROL IN ATRIAL FIBRILLATION   Yes Historical Provider       Current Medications:  Current Facility-Administered Medications   Medication Dose Route Frequency Provider Last Rate Last Dose    alum-mag hydroxide-simeth (MYLANTA) oral suspension 30 mL  30 mL Oral Q4H PRN Yesi Cazares MD        [START ON 8/25/2018] benazepril (LOTENSIN) tablet 20 mg  20 mg Oral DAILY Alexander Barksdale MD        amiodarone (CORDARONE) tablet 200 mg  200 mg Oral DAILY Yesi Cazares MD   200 mg at 08/24/18 8459    rosuvastatin (CRESTOR) tablet 10 mg  10 mg Oral QHS Yesi Cazares MD   10 mg at 08/23/18 2126    0.9% sodium chloride infusion  100 mL/hr IntraVENous CONTINUOUS Yesi Cazares  mL/hr at 08/23/18 1722 100 mL/hr at 08/23/18 1722    sodium chloride (NS) flush 5-10 mL  5-10 mL IntraVENous Q8H Yesi Cazares MD   10 mL at 08/24/18 0669    sodium chloride (NS) flush 5-10 mL  5-10 mL IntraVENous PRN Claudius Litten, MD        acetaminophen (TYLENOL) tablet 1,000 mg  1,000 mg Oral Q6H Claudius Litten, MD   1,000 mg at 08/24/18 1731    celecoxib (CELEBREX) capsule 200 mg  200 mg Oral Q12H Claudius Litten, MD   200 mg at 08/24/18 0853    oxyCODONE IR (ROXICODONE) tablet 10 mg  10 mg Oral Q4H PRN Claudius Litten, MD   10 mg at 08/24/18 0957    HYDROmorphone (PF) (DILAUDID) injection 1 mg  1 mg IntraVENous Q3H PRN Claudius Litten, MD        naloxone [facility]) injection 0.2-0.4 mg  0.2-0.4 mg IntraVENous Q10MIN PRN Claudius Litten, MD        dexamethasone (DECADRON) injection 10 mg  10 mg IntraVENous ONCE Claudius Litten, MD        promethazine (PHENERGAN) tablet 25 mg  25 mg Oral Q6H PRN Claudius Litten, MD        diphenhydrAMINE (BENADRYL) capsule 25 mg  25 mg Oral Q4H PRN Claudius Litten, MD        senna-docusate (PERICOLACE) 8.6-50 mg per tablet 2 Tab  2 Tab Oral DAILY Claudius Litten, MD   2 Tab at 08/24/18 3944    zolpidem (AMBIEN) tablet 5 mg  5 mg Oral QHS PRN Claudius Litten, MD        aspirin delayed-release tablet 81 mg  81 mg Oral DAILY Claudius Litten, MD   81 mg at 08/24/18 0853    enoxaparin (LOVENOX) injection 30 mg  30 mg SubCUTAneous DAILY Claudius Litten, MD   30 mg at 08/24/18 7255    warfarin (COUMADIN) tablet 5 mg  5 mg Oral QHS Claudius Litten, MD   5 mg at 08/24/18 1731    ondansetron (ZOFRAN ODT) tablet 8 mg  8 mg Oral Q8H PRN Claudius Litten, MD        oxyCODONE IR (ROXICODONE) tablet 5 mg  5 mg Oral Q4H PRN Claudius Litten, MD   5 mg at 08/24/18 1731    hydrALAZINE (APRESOLINE) 20 mg/mL injection 10 mg  10 mg IntraVENous Q4H PRN Sadaf Womack MD            Review of Systems: Denies chest pain, shortness of breath, cough, headache, visual problems, abdominal pain, dysurea, calf pain. Pertinent positives are as noted in the medical records and unremarkable otherwise.     Vital Signs: Patient Vitals for the past 8 hrs:   BP Temp Pulse Resp SpO2   08/24/18 1532 140/55 98.2 °F (36.8 °C) (!) 50 16 100 %        Physical Exam:   General: Alert and age appropriately oriented. No acute cardio respiratory distress. HEENT: Normocephalic. Oral mucosa moist without cyanosis. Lungs: Clear to auscultation  bilaterally. Respiration even and unlabored   Heart: Regular rate and rhythm, S1, S2   No  murmurs, clicks, rub or gallops   Abdomen: Soft, non-tender, nondistended. Bowel sounds present   Genitourinary: defered   Neuromuscular:      Grossly no focal neurological deficits noted. L Ankle dorsiflexion 5/5   L Ankle plantarflexion 5/5  R Ankle dorsiflexion 5/5   R Ankle plantarflexion 5/5  No sensory deficits. Skin/extremity: No rashes, no erythema. Calves soft. Wound covered.      Skin Incision(s)/Wound(s):        Lab Review:   Recent Results (from the past 72 hour(s))   GLUCOSE, POC    Collection Time: 08/23/18  9:46 AM   Result Value Ref Range    Glucose (POC) 89 65 - 100 mg/dL   TYPE & SCREEN    Collection Time: 08/23/18  9:51 AM   Result Value Ref Range    Crossmatch Expiration 08/26/2018     ABO/Rh(D) B POSITIVE     Antibody screen NEG    PTT    Collection Time: 08/23/18  9:51 AM   Result Value Ref Range    aPTT 30.1 23.2 - 35.3 SEC   PROTHROMBIN TIME + INR    Collection Time: 08/23/18  6:55 PM   Result Value Ref Range    Prothrombin time 14.3 11.5 - 14.5 sec    INR 1.1     HEMOGLOBIN    Collection Time: 08/23/18  6:55 PM   Result Value Ref Range    HGB 12.7 11.7 - 15.4 g/dL   HEMOGLOBIN    Collection Time: 08/24/18  5:47 AM   Result Value Ref Range    HGB 11.2 (L) 11.7 - 15.4 g/dL   PROTHROMBIN TIME + INR    Collection Time: 08/24/18  5:47 AM   Result Value Ref Range    Prothrombin time 14.3 11.5 - 14.5 sec    INR 1.1     METABOLIC PANEL, BASIC    Collection Time: 08/24/18  5:47 AM   Result Value Ref Range    Sodium 139 136 - 145 mmol/L    Potassium 4.4 3.5 - 5.1 mmol/L    Chloride 104 98 - 107 mmol/L    CO2 23 21 - 32 mmol/L    Anion gap 12 7 - 16 mmol/L    Glucose 141 (H) 65 - 100 mg/dL    BUN 22 8 - 23 MG/DL    Creatinine 0.82 0.6 - 1.0 MG/DL    GFR est AA >60 >60 ml/min/1.73m2    GFR est non-AA >60 >60 ml/min/1.73m2    Calcium 8.1 (L) 8.3 - 10.4 MG/DL       PT Initial  PT Most Recent   AROM: Within functional limits (except right lower extremity, s/p TKA) (08/23/18 1608) Within functional limits (except right lower extremity, s/p TKA) (08/23/18 1608)         Strength: Within functional limits (except right lower extremity, s/p TKA) (08/23/18 1608) Within functional limits (except right lower extremity, s/p TKA) (08/23/18 1608)                           Distance (ft): 4 Feet (ft) (08/23/18 1608) 104 Feet (ft) (and another 104 feet) (08/24/18 1400)   Assistive Device: Walker, rolling (08/23/18 1608) Walker, rolling (08/24/18 1400)       OT Initial OT Most Recent                                               ST Initial ST Most Recent                        Principal Problem:    S/P total knee arthroplasty, right (8/24/2018)    Active Problems:    Osteoarthritis (12/1/2015)          Condition on discharge :  good  Rehabilitation  potential : Good     Goals in Rehab : Patient to reach maximal rehabilitation potential in functional mobility,ambulation and ADL/ self care skills ; to improve strength and endurance    Expected Length Of Stay : Depending on pace of progress in mobility and self care in PT and OT ,therapies    Discharge Instructions:  Rehabilitation Management/ Medical Management: 1. Devices:Walkers, Type: Rolling Walker  2. Consult:Rehab team including PT, OT,  and . 3. Disposition Rehab-discussed with patient. 4. Thigh-high or knee-high ROULA's when out of bed. 5. DVT Prophylaxis - started on Coumadin per orthopedic surgeon x 30 days. INR 1.8 on 8/26. Lovenox stopped. Coumadin reduced from 5mg to 3 mg on discharge to SNF. Facility MD to dose as appropriate. Monitor INR.  Please notify surgeon at [address] [place] [address] if DVT diagnosed. 6. Incentive spirometer Q1H while awake  7. Post op hemorrhagic anemia- monitor. 8. Activity: WBAT RLE  9. Planned Labs: CBC,BMP, MOnitor INR daily until otherwise directed per facility MD. Please arrange INR checks and coumadin dosing following discharge from [facility]. 10. Pain Control: fair control. Continue scheduled tylenol, celebrex and  PRN meds. Continue current management. 11. Wound Care: May remove Aquacel 1 week post op and replace with Tegaderm and sterile gauze dressing. Keep wound clean and dry and reinforce dressing PRN. Remove staples 12-14 post surgery, when incision appears appropriately closed and apply benzoin and 1/2\" steristrips. 12. In case of complications: Please notify surgeon at [address] [place] [address] if suspect infection, cellulitis, wound dehiscence or instrument failure, and if considering starting antibiotic. Follow up with ORTHO per instructions. [address] [place] [address] [phone]        Discharge Medications:    celecoxib (CELEBREX) capsule 200 mg   Route: Take 1 Cap by mouth every twelve (12) hours every twelve (12) hours. - Oral       warfarin (COUMADIN) tablet 3 mg    Sig - Route: Take 1 Tab by mouth nightly. - Oral  Facility MD to dose as appropriate. Monitor INR. oxyCODONE IR (ROXICODONE) tablet 5 mg 1 Tab  Ordered Dose: 5mg Route: Oral Frequency: EVERY 4 HOURS  as needed for pain      acetaminophen (TYLENOL) tablet 1,000 mg Ordered Dose: 1,000 mg Route: Oral Frequency: EVERY 8 HOURS x 1 week then change to prn.              senna-docusate (PERICOLACE) 8.6-50 mg per tablet 2 Tab Ordered Dose: 2 Tab Route: Oral Frequency: DAILY       Current Discharge Medication List      CONTINUE these medications which have NOT CHANGED    Details   mupirocin calcium (BACTROBAN) 2 % nasal ointment by Both Nostrils route two (2) times a day. apixaban (ELIQUIS) 5 mg tablet Take 5 mg by mouth two (2) times a day. HOLD.       magnesium 250 mg tab Take 1 Tab by mouth nightly. multivitamin (ONE A DAY) tablet Take 1 Tab by mouth daily. acetaminophen (TYLENOL EXTRA STRENGTH) 500 mg tablet Take 500 mg by mouth every six (6) hours as needed for Pain. calcium-cholecalciferol, d3, 600-125 mg-unit tab Take 1 Tab by mouth daily. fenofibrate (TRICOR) 160 mg tablet Take 160 mg by mouth nightly. rosuvastatin (CRESTOR) 10 mg tablet Take 10 mg by mouth nightly. benazepril (LOTENSIN) 20 mg tablet Take 20 mg by mouth daily. Indications: HYPERTENSION      amiodarone (CORDARONE) 200 mg tablet Take 200 mg by mouth daily. Indications: VENTRICULAR RATE CONTROL IN ATRIAL FIBRILLATION             Discharge time: 35 minutes.     Signed By: Elisa Parker MD     August 24, 2018

## 2018-08-24 NOTE — PROGRESS NOTES
.Shift assessment complete. Call light within reach, wheels locked, side rails up x3, bed low and locked and family members at bedside. No neurovascular deficits noted, pt is able to dorsi/plantar flex and has +2 pedal pulses. Ice man ice pack on knee. Dressing clean, dry and intact. Pain controlled at this time and IS at bedside. No needs stated.

## 2018-08-24 NOTE — PROGRESS NOTES
Problem: Mobility Impaired (Adult and Pediatric)  Goal: *Acute Goals and Plan of Care (Insert Text)  GOALS (1-4 days):  (1.)Ms. Tiffany Gracia will move from supine to sit and sit to supine  in bed with STAND BY ASSIST.  (2.)Ms. Tiffany Gracia will transfer from bed to chair and chair to bed with STAND BY ASSIST using the least restrictive device. (3.)Ms. Tiffany Gracia will ambulate with STAND BY ASSIST for 250 feet with the least restrictive device. (4.)Ms. Tiffany Gracia will ambulate up/down 3 steps with bilateral  railing with CONTACT GUARD ASSIST with no device. (5.)Ms. Tiffany Gracia will increase right knee ROM to 5°-80°.  ________________________________________________________________________________________________      PHYSICAL THERAPY Joint camp tKa: Daily Note, Treatment Day: 1st, PM 8/24/2018  INPATIENT: Hospital Day: 2  [place] / Product Type: Medicare /      NAME/AGE/GENDER: Julia Horn is a 70 y.o. female   PRIMARY DIAGNOSIS:  Primary osteoarthritis of right knee [M17.11]   Procedure(s) and Anesthesia Type:     * RIGHT KNEE ARTHROPLASTY TOTAL/DEPUY - Spinal (Right)  ICD-10: Treatment Diagnosis:    · Pain in Right Knee (M25.561)  · Stiffness of Right Knee, Not elsewhere classified (M25.661)  · Difficulty in walking, Not elsewhere classified (R26.2)      ASSESSMENT:     Ms. Tiffany Gracia presents up in chair on contact. Walked to gym for afternoon therapy. Verbal cues for gait training. In gym worked on TKA exercises with verbal cues progressing with repetitions. Pt walked back to room with verbal cues. In room pt was ready to get back in the bed. In supine, ice in place and needs in reach. Hope to progress at next session. This section established at most recent assessment   PROBLEM LIST (Impairments causing functional limitations):  1. Decreased Strength  2. Decreased ADL/Functional Activities  3. Decreased Transfer Abilities  4. Decreased Ambulation Ability/Technique  5.  Decreased Flexibility/Joint Mobility  6. Edema/Girth  7. Decreased [place] with Home Exercise Program   INTERVENTIONS PLANNED: (Benefits and precautions of physical therapy have been discussed with the patient.)  1. Bed Mobility  2. Cold  3. Gait Training  4. Home Exercise Program (HEP)  5. Therapeutic Exercise/Strengthening  6. Transfer Training  7. Range of Motion: active/assisted/passive  8. Therapeutic Activities  9. Group Therapy     TREATMENT PLAN: Frequency/Duration: Follow patient BID for duration of hospital stay to address above goals. Rehabilitation Potential For Stated Goals: Good     RECOMMENDED REHABILITATION/EQUIPMENT: (at time of discharge pending progress): Continue Skilled Therapy and pt wants to pursue rehab. HISTORY:   History of Present Injury/Illness (Reason for Referral):  Pt s/p right TKA on 18  Past Medical History/Comorbidities:   Ms. Jeremy Abreu  has a past medical history of Atrial septal aneurysm; CAD (coronary artery disease); Dyslipidemia; Heart murmur; History of breast cancer (); Hypertension; Osteoarthritis; Paroxysmal atrial fibrillation ([address] Patent foramen ovale; and Valvular heart disease. She also has no past medical history of Adverse effect of anesthesia; Asthma; Autoimmune disease ([address] Chronic kidney disease; Chronic obstructive pulmonary disease ([address] Chronic pain; Coagulation disorder ([address] Diabetes ([address] Difficult intubation; GERD (gastroesophageal reflux disease); Heart failure ([address] Ill-defined condition; Liver disease; Malignant hyperthermia due to anesthesia; Morbid obesity ([address] Nausea & vomiting; Pseudocholinesterase deficiency; Psychiatric disorder; PUD (peptic ulcer disease); Seizures ([address] Stroke [facility]); Thromboembolus ([address] Thyroid disease; Unspecified adverse effect of anesthesia; or Unspecified sleep apnea.   Ms. Jeremy Abreu  has a past surgical history that includes hx mastectomy (Left, ); hx hysterectomy (); hx  section; hx cholecystectomy (2014); hx cataract removal (Bilateral); hx back surgery; hx rotator cuff repair (Left, 2007); and hx other surgical (2016). Social History/Living Environment:   Home Environment: Private residence  # Steps to Enter: 0  One/Two Story Residence: One story  Living Alone: Yes  Support Systems: Family member(s)  Patient Expects to be Discharged to[de-identified] Rehabilitation facility  Current DME Used/Available at Home: Cane, straight, Raised toilet seat, Walker, rolling  Tub or Shower Type: Shower  Prior Level of Function/Work/Activity:  Pt living at home, independent with gait with a walker, independent with ADLs   Number of Personal Factors/Comorbidities that affect the Plan of Care: 0: LOW COMPLEXITY   EXAMINATION:   Most Recent Physical Functioning:               RLE AROM  R Knee Flexion: 90  R Knee Extension: 10            Bed Mobility  Supine to Sit:  (up in chair on contact)  Sit to Supine: Stand-by assistance    Transfers  Sit to Stand: Supervision;Stand-by assistance  Stand to Sit: Supervision;Stand-by assistance  Bed to Chair: Stand-by assistance    Balance  Sitting: Intact  Standing: Intact; With support         Gait Training: Yes    Weight Bearing Status  Right Side Weight Bearing: As tolerated  Distance (ft): 104 Feet (ft) (and another 104 feet)  Ambulation - Level of Assistance: Stand-by assistance  Assistive Device: Walker, rolling  Speed/Ariela: Pace decreased (<100 feet/min)  Step Length: Left shortened  Stance: Right decreased  Gait Abnormalities: Antalgic  Interventions: Safety awareness training;Verbal cues     Braces/Orthotics: none    Right Knee Cold  Type: Cryocuff  Patient Position: Sitting      Body Structures Involved:  1. Joints  2. Muscles Body Functions Affected:  1. Movement Related Activities and Participation Affected:  1. Mobility  2.  Self Care   Number of elements that affect the Plan of Care: 4+: HIGH COMPLEXITY   CLINICAL PRESENTATION:   Presentation: Stable and uncomplicated: LOW COMPLEXITY   CLINICAL DECISION MAKING:   [facility] AM-PAC 6 Clicks   Basic Mobility Inpatient Short Form  How much difficulty does the patient currently have. .. Unable A Lot A Little None   1. Turning over in bed (including adjusting bedclothes, sheets and blankets)? [] 1   [] 2   [x] 3   [] 4   2. Sitting down on and standing up from a chair with arms ( e.g., wheelchair, bedside commode, etc.)   [] 1   [] 2   [x] 3   [] 4   3. Moving from lying on back to sitting on the side of the bed? [] 1   [] 2   [x] 3   [] 4   How much help from another person does the patient currently need. .. Total A Lot A Little None   4. Moving to and from a bed to a chair (including a wheelchair)? [] 1   [] 2   [x] 3   [] 4   5. Need to walk in hospital room? [] 1   [] 2   [x] 3   [] 4   6. Climbing 3-5 steps with a railing? [] 1   [] 2   [x] 3   [] 4   © 2007, Trustees of [facility], under license to [employer]. All rights reserved        Score:  Initial: 18 Most Recent: X (Date: -- )    Interpretation of Tool:  Represents activities that are increasingly more difficult (i.e. Bed mobility, Transfers, Gait). Score 24 23 22-20 19-15 14-10 9-7 6     Modifier CH CI CJ CK CL CM CN      ? Mobility - Walking and Moving Around:     - CURRENT STATUS: CK - 40%-59% impaired, limited or restricted    - GOAL STATUS: CI - 1%-19% impaired, limited or restricted    - D/C STATUS:  ---------------To be determined---------------  [place] / Product Type: Medicare /      Medical Necessity:     · Patient is expected to demonstrate progress in strength, range of motion and functional technique to decrease assistance required with functional mobility and TKA exercises. Reason for Services/Other Comments:  · Patient continues to require skilled intervention due to inability to complete functional mobility and TKA exercises.    Use of outcome tool(s) and clinical judgement create a POC that gives a: Clear prediction of patient's progress: LOW COMPLEXITY            TREATMENT:   (In addition to Assessment/Re-Assessment sessions the following treatments were rendered)     Pre-treatment Symptoms/Complaints: having some pain  Pain Initial:   Pain Intensity 1: 6  Post Session:  6/10     Therapeutic Exercise: (45 Minutes (group)):  Exercises per grid below to improve mobility and strength. Required minimal verbal cues to promote proper body alignment and promote proper body posture. Progressed repetitions as indicated. Gait Training (15 Minutes):  Gait training to improve and/or restore physical functioning as related to mobility and strength. Ambulated 104 Feet (ft) (and another 104 feet) with Stand-by assistance using a Walker, rolling and minimal Safety awareness training;Verbal cues related to their stance phase and stride length to promote proper body alignment and promote proper body posture. Instruction in performance of walker use and gait sequencing to correct stance phase and stride length. Date:  8/23/18 Date:  8/24/18 Date:     ACTIVITY/EXERCISE AM PM AM PM AM PM   GROUP THERAPY  []  []  []  [x]  []  []   Ankle Pumps  10 12 15     Quad Sets  10 12 15     Gluteal Sets  10 12 15     Hip ABd/ADduction  10 12 15     Straight Leg Raises  10 12 15     Knee Slides  10 12 15     Short Arc Quads    15     Long Arc Quads         Chair Slides    15              B = bilateral; AA = active assistive; A = active; P = passive      Treatment/Session Assessment:     Response to Treatment: tolerated well, with some pain. Nice progress.      Education:  [x] Home Exercises  [x] Fall Precautions   [x] D/C Instruction Review  [x] Knee Prosthesis Review  [x] Walker Management/Safety [] Adaptive Equipment as Needed       Interdisciplinary Collaboration:   o Registered Nurse  o Rehabilitation Attendant    After treatment position/precautions:   o Supine in bed  o Bed/Chair-wheels locked  o Bed in low position  o Call light within reach    Compliance with Program/Exercises: compliant all of the time. Recommendations/Intent for next treatment session:  Treatment next visit will focus on increasing Ms. Gary's independence with bed mobility, transfers, gait training, strength/ROM exercises, modalities for pain, and patient education.       Total Treatment Duration:  PT Patient Time In/Time Out  Time In: 1300  Time Out: Stacey Aviles 1313, PT

## 2018-08-25 LAB
HGB BLD-MCNC: 10 G/DL (ref 11.7–15.4)
INR PPP: 1.4
PROTHROMBIN TIME: 17.8 SEC (ref 11.5–14.5)

## 2018-08-25 PROCEDURE — 85610 PROTHROMBIN TIME: CPT

## 2018-08-25 PROCEDURE — 65270000029 HC RM PRIVATE

## 2018-08-25 PROCEDURE — 36415 COLL VENOUS BLD VENIPUNCTURE: CPT

## 2018-08-25 PROCEDURE — 97150 GROUP THERAPEUTIC PROCEDURES: CPT

## 2018-08-25 PROCEDURE — 97110 THERAPEUTIC EXERCISES: CPT

## 2018-08-25 PROCEDURE — 85018 HEMOGLOBIN: CPT

## 2018-08-25 PROCEDURE — 97116 GAIT TRAINING THERAPY: CPT

## 2018-08-25 PROCEDURE — 74011250637 HC RX REV CODE- 250/637: Performed by: HOSPITALIST

## 2018-08-25 PROCEDURE — 74011250636 HC RX REV CODE- 250/636: Performed by: ORTHOPAEDIC SURGERY

## 2018-08-25 PROCEDURE — 74011250637 HC RX REV CODE- 250/637: Performed by: ORTHOPAEDIC SURGERY

## 2018-08-25 RX ORDER — WARFARIN 2.5 MG/1
2.5 TABLET ORAL EVERY EVENING
Status: DISCONTINUED | OUTPATIENT
Start: 2018-08-25 | End: 2018-08-26 | Stop reason: HOSPADM

## 2018-08-25 RX ADMIN — ACETAMINOPHEN 1000 MG: 500 TABLET, FILM COATED ORAL at 09:01

## 2018-08-25 RX ADMIN — OXYCODONE HYDROCHLORIDE 5 MG: 5 TABLET ORAL at 01:11

## 2018-08-25 RX ADMIN — ENOXAPARIN SODIUM 30 MG: 30 INJECTION SUBCUTANEOUS at 09:01

## 2018-08-25 RX ADMIN — CELECOXIB 200 MG: 200 CAPSULE ORAL at 09:01

## 2018-08-25 RX ADMIN — DOCUSATE SODIUM AND SENNOSIDES 2 TABLET: 8.6; 5 TABLET, FILM COATED ORAL at 09:01

## 2018-08-25 RX ADMIN — ACETAMINOPHEN 1000 MG: 500 TABLET, FILM COATED ORAL at 16:58

## 2018-08-25 RX ADMIN — ASPIRIN 81 MG: 81 TABLET, DELAYED RELEASE ORAL at 09:01

## 2018-08-25 RX ADMIN — Medication 10 ML: at 22:23

## 2018-08-25 RX ADMIN — CELECOXIB 200 MG: 200 CAPSULE ORAL at 22:23

## 2018-08-25 RX ADMIN — ROSUVASTATIN CALCIUM 10 MG: 5 TABLET, FILM COATED ORAL at 22:23

## 2018-08-25 RX ADMIN — OXYCODONE HYDROCHLORIDE 10 MG: 5 TABLET ORAL at 09:02

## 2018-08-25 RX ADMIN — OXYCODONE HYDROCHLORIDE 10 MG: 5 TABLET ORAL at 16:57

## 2018-08-25 RX ADMIN — BENAZEPRIL HYDROCHLORIDE 20 MG: 10 TABLET ORAL at 09:02

## 2018-08-25 RX ADMIN — AMIODARONE HYDROCHLORIDE 200 MG: 200 TABLET ORAL at 09:01

## 2018-08-25 RX ADMIN — ACETAMINOPHEN 1000 MG: 500 TABLET, FILM COATED ORAL at 01:10

## 2018-08-25 RX ADMIN — WARFARIN SODIUM 2.5 MG: 2.5 TABLET ORAL at 16:57

## 2018-08-25 RX ADMIN — OXYCODONE HYDROCHLORIDE 10 MG: 5 TABLET ORAL at 22:22

## 2018-08-25 NOTE — PROGRESS NOTES
CC: Patient presents with:  Weight Check: down 3 lb from last ov        HPI:     Patient here for follow up   Feels that he has been doing well  Doing better with cravings control   Still feels that phentermine 37.5 mg working well for craving control Pt resting quietly in bed. Pt denies pain at this time. No needs at present. Bed Low and locked. Call light w/ in reach. Hyperlipidemia     Prediabetes     Fatigue     MALINDA (obstructive sleep apnea)     Back pain     Obesity (BMI 30-39. 9)     Encounter for therapeutic drug monitoring     ED (erectile dysfunction)     Constipation     Rising PSA level        REVIEW OF SYSTEMS: (around 11/19/2017) for weight mangement.

## 2018-08-25 NOTE — PROGRESS NOTES
Shift Assessment Complete. Pt is post op day 2 from [address]. Pt is A&Ox 3.  +2 pedal pulses with purposeful movement in all four extremities. Dressing is clean, dry and intact. PIV capped. Pt c/o pain. See MAR  Bed low and locked. Side rails x3. Call light with in reach. Pt verbalizes understanding of call light.

## 2018-08-25 NOTE — PROGRESS NOTES
2018         Post Op day: 2 Days Post-Op     Admit Date: 2018  Admit Diagnosis: Primary osteoarthritis of right knee [M17.11]        Subjective: Doing well, No complaints, No SOB, No Chest Pain, No Nausea or Vomiting     Objective:   Vital Signs are Stable, No Acute Distress, Alert and Oriented, Dressing is Dry,  Neurovascular exam is normal.     Assessment / Plan :  Patient Active Problem List   Diagnosis Code    HTN (hypertension) I10    Personal history of breast cancer Z85.3    Hyperlipidemia E78.5    Atrial fibrillation ([facility] I48.91    Acute cholecystitis K81.0    Osteoarthritis M19.90    S/P total knee arthroplasty Z96.659    Perineal abscess L02.215    Infection of perineal wound T81. 4XXA    S/P total knee arthroplasty, right Z96.651    Patient Vitals for the past 8 hrs:   BP Temp Pulse Resp SpO2   18 0400 127/62 97.5 °F (36.4 °C) (!) 51 18 98 %   18 0000 111/62 97.6 °F (36.4 °C) (!) 53 18 99 %    Temp (24hrs), Av.9 °F (36.6 °C), Min:97.5 °F (36.4 °C), Max:98.2 °F (36.8 °C)    Body mass index is 29.01 kg/(m^2).     Lab Results   Component Value Date/Time    HGB 10.0 (L) 2018 05:10 AM      Pt seen by and discussed with [address] [place] [address] for rehab placement tomorrow       Signed By: LISA Hanson

## 2018-08-25 NOTE — PROGRESS NOTES
Problem: Mobility Impaired (Adult and Pediatric)  Goal: *Acute Goals and Plan of Care (Insert Text)  GOALS (1-4 days):  (1.)Ms. Margarete Duane will move from supine to sit and sit to supine  in bed with STAND BY ASSIST.  (2.)Ms. Margarete Duane will transfer from bed to chair and chair to bed with STAND BY ASSIST using the least restrictive device. (3.)Ms. Margarete Duane will ambulate with STAND BY ASSIST for 250 feet with the least restrictive device. Progressing 8/25/18  (4.)Ms. Margarete Duane will ambulate up/down 3 steps with bilateral  railing with CONTACT GUARD ASSIST with no device. D/C goal - going to rehab  (5.)Ms. Margarete Duane will increase right knee ROM to 5°-80°. Progressing 8/25/18  ________________________________________________________________________________________________      PHYSICAL THERAPY Joint camp tKa: Daily Note, AM 8/25/2018  INPATIENT: Hospital Day: 3  [place] / Product Type: Medicare /      NAME/AGE/GENDER: Hyun Mujica is a 70 y.o. female   PRIMARY DIAGNOSIS:  Primary osteoarthritis of right knee [M17.11]   Procedure(s) and Anesthesia Type:     * RIGHT KNEE ARTHROPLASTY TOTAL/DEPUY - Spinal (Right)  ICD-10: Treatment Diagnosis:    · Pain in Right Knee (M25.561)  · Stiffness of Right Knee, Not elsewhere classified (M25.661)  · Difficulty in walking, Not elsewhere classified (R26.2)      ASSESSMENT:     Ms. Margarete Duane participated well this afternoon. Moving well and commented that she felt like she was walking normal.  Patient planning on [facility] tomorrow. This section established at most recent assessment   PROBLEM LIST (Impairments causing functional limitations):  1. Decreased Strength  2. Decreased ADL/Functional Activities  3. Decreased Transfer Abilities  4. Decreased Ambulation Ability/Technique  5. Decreased Flexibility/Joint Mobility  6. Edema/Girth  7.  Decreased [place] with Home Exercise Program   INTERVENTIONS PLANNED: (Benefits and precautions of physical therapy have been discussed with the patient.)  1. Bed Mobility  2. Cold  3. Gait Training  4. Home Exercise Program (HEP)  5. Therapeutic Exercise/Strengthening  6. Transfer Training  7. Range of Motion: active/assisted/passive  8. Therapeutic Activities  9. Group Therapy     TREATMENT PLAN: Frequency/Duration: Follow patient BID for duration of hospital stay to address above goals. Rehabilitation Potential For Stated Goals: Good     RECOMMENDED REHABILITATION/EQUIPMENT: (at time of discharge pending progress): Continue Skilled Therapy and pt wants to pursue rehab. HISTORY:   History of Present Injury/Illness (Reason for Referral):  Pt s/p right TKA on 18  Past Medical History/Comorbidities:   Ms. Valery Perez  has a past medical history of Atrial septal aneurysm; CAD (coronary artery disease); Dyslipidemia; Heart murmur; History of breast cancer (); Hypertension; Osteoarthritis; Paroxysmal atrial fibrillation ([address] Patent foramen ovale; and Valvular heart disease. She also has no past medical history of Adverse effect of anesthesia; Asthma; Autoimmune disease ([address] Chronic kidney disease; Chronic obstructive pulmonary disease ([address] Chronic pain; Coagulation disorder ([address] Diabetes ([address] Difficult intubation; GERD (gastroesophageal reflux disease); Heart failure ([address] Ill-defined condition; Liver disease; Malignant hyperthermia due to anesthesia; Morbid obesity ([address] Nausea & vomiting; Pseudocholinesterase deficiency; Psychiatric disorder; PUD (peptic ulcer disease); Seizures ([address] Stroke [facility]); Thromboembolus ([address] Thyroid disease; Unspecified adverse effect of anesthesia; or Unspecified sleep apnea. Ms. Valery Perez  has a past surgical history that includes hx mastectomy (Left, ); hx hysterectomy (); hx  section; hx cholecystectomy (); hx cataract removal (Bilateral); hx back surgery; hx rotator cuff repair (Left, ); and hx other surgical ().   Social History/Living Environment:   Home Environment: Private residence  # Steps to Enter: 0  One/Two Story Residence: One story  Living Alone: Yes  Support Systems: Family member(s)  Patient Expects to be Discharged to[de-identified] Rehabilitation facility  Current DME Used/Available at Home: Cane, straight, Raised toilet seat, Walker, rolling  Tub or Shower Type: Shower  Prior Level of Function/Work/Activity:  Pt living at home, independent with gait with a walker, independent with ADLs   Number of Personal Factors/Comorbidities that affect the Plan of Care: 0: LOW COMPLEXITY   EXAMINATION:   Most Recent Physical Functioning:      Gross Assessment  AROM: Within functional limits (L LE)  Strength: Within functional limits (L LE)        RLE AROM  R Knee Flexion: 80  R Knee Extension: 8       RLE Strength  R Hip Flexion: 2  R Knee Flexion: 3-  R Knee Extension: 2+    Bed Mobility  Sit to Supine: Stand-by assistance    Transfers  Sit to Stand: Supervision  Stand to Sit: Supervision  Bed to Chair: Supervision    Balance  Sitting: Intact  Standing: With support              Weight Bearing Status  Right Side Weight Bearing: As tolerated  Distance (ft): 230 Feet (ft)  Ambulation - Level of Assistance: Supervision  Assistive Device: Walker, rolling  Speed/Ariela: Pace decreased (<100 feet/min)  Step Length: Left shortened;Right shortened  Stance: Right decreased  Gait Abnormalities: Antalgic  Interventions: Safety awareness training;Verbal cues     Braces/Orthotics: none    Right Knee Cold  Type: Cryocuff      Body Structures Involved:  1. Joints  2. Muscles Body Functions Affected:  1. Movement Related Activities and Participation Affected:  1. Mobility  2.  Self Care   Number of elements that affect the Plan of Care: 4+: HIGH COMPLEXITY   CLINICAL PRESENTATION:   Presentation: Stable and uncomplicated: LOW COMPLEXITY   CLINICAL DECISION [facility] [address] [address] AM-PAC 6 Clicks   Basic Mobility Inpatient Short Form  How much difficulty does the patient currently have. .. Unable A Lot A Little None   1. Turning over in bed (including adjusting bedclothes, sheets and blankets)? [] 1   [] 2   [x] 3   [] 4   2. Sitting down on and standing up from a chair with arms ( e.g., wheelchair, bedside commode, etc.)   [] 1   [] 2   [x] 3   [] 4   3. Moving from lying on back to sitting on the side of the bed? [] 1   [] 2   [x] 3   [] 4   How much help from another person does the patient currently need. .. Total A Lot A Little None   4. Moving to and from a bed to a chair (including a wheelchair)? [] 1   [] 2   [x] 3   [] 4   5. Need to walk in hospital room? [] 1   [] 2   [x] 3   [] 4   6. Climbing 3-5 steps with a railing? [] 1   [] 2   [x] 3   [] 4   © 2007, Trustees of [address] [place] [address], under license to [employer]. All rights reserved        Score:  Initial: 18 Most Recent: X (Date: -- )    Interpretation of Tool:  Represents activities that are increasingly more difficult (i.e. Bed mobility, Transfers, Gait). Score 24 23 22-20 19-15 14-10 9-7 6     Modifier CH CI CJ CK CL CM CN      ? Mobility - Walking and Moving Around:     - CURRENT STATUS: CK - 40%-59% impaired, limited or restricted    - GOAL STATUS: CI - 1%-19% impaired, limited or restricted    - D/C STATUS:  ---------------To be determined---------------  [place] / Product Type: Medicare /      Medical Necessity:     · Patient is expected to demonstrate progress in strength, range of motion and functional technique to decrease assistance required with functional mobility and TKA exercises. Reason for Services/Other Comments:  · Patient continues to require skilled intervention due to inability to complete functional mobility and TKA exercises.    Use of outcome tool(s) and clinical judgement create a POC that gives a: Clear prediction of patient's progress: LOW COMPLEXITY            TREATMENT:   (In addition to Assessment/Re-Assessment sessions the following treatments were rendered)     Pre-treatment Symptoms/Complaints: Patient up in bathroom and ready for PT. Pain Initial:   Pain Intensity 1: 4  Pain Location 1: Knee  Pain Orientation 1: Right  Pain Intervention(s) 1: Ambulation/Increased Activity, Cold pack, Exercise  Post Session:  4/10     Therapeutic Exercise: (15 Minutes):  Exercises per grid below to improve mobility and strength. Required minimal verbal cues to promote proper body alignment and promote proper body posture. Progressed repetitions as indicated. Gait Training (15 Minutes):  Gait training to improve and/or restore physical functioning as related to mobility and strength. Ambulated 230 Feet (ft) with Supervision using a Walker, rolling and minimal Safety awareness training;Verbal cues related to their stance phase and stride length to promote proper body alignment and promote proper body posture. Instruction in performance of walker use and gait sequencing to correct stance phase and stride length. Date:  8/23/18 Date:  8/24/18 Date:  8/25/18   ACTIVITY/EXERCISE AM PM AM PM AM PM   GROUP THERAPY  []  []  []  [x]  [x]  []   Ankle Pumps  10 12 15 20 20   Quad Sets  10 12 15 20 20   Gluteal Sets  10 12 15 20 20   Hip ABd/ADduction  10 12 15 20 20   Straight Leg Raises  10 12 15 20 20   Knee Slides  10 12 15 20 20   Short Arc Quads    15 20 20   Long Arc Quads         Chair Slides    15 20 20            B = bilateral; AA = active assistive; A = active; P = passive      Treatment/Session Assessment:     Response to Treatment:  Patient participated well this afternoon. Ambulated increased distance with minimal impairments.        Education:  [x] Home Exercises  [x] Fall Precautions   [x] D/C Instruction Review  [x] Knee Prosthesis Review  [x] Walker Management/Safety [] Adaptive Equipment as Needed       Interdisciplinary Collaboration:   o Physical Therapist  o Registered Nurse    After treatment position/precautions:   o Supine in bed  o Bed/Chair-wheels locked  o Bed in low position  o Call light within reach    Compliance with Program/Exercises: compliant all of the time. Recommendations/Intent for next treatment session:  Treatment next visit will focus on increasing Ms. Gary's independence with bed mobility, transfers, gait training, strength/ROM exercises, modalities for pain, and patient education.       Total Treatment Duration:  PT Patient Time In/Time Out  Time In: 1430  Time Out: [facility]

## 2018-08-25 NOTE — PROGRESS NOTES
Problem: Mobility Impaired (Adult and Pediatric)  Goal: *Acute Goals and Plan of Care (Insert Text)  GOALS (1-4 days):  (1.)Ms. Santiago Howell will move from supine to sit and sit to supine  in bed with STAND BY ASSIST.  (2.)Ms. Santiago Howell will transfer from bed to chair and chair to bed with STAND BY ASSIST using the least restrictive device. (3.)Ms. Santiago Howell will ambulate with STAND BY ASSIST for 250 feet with the least restrictive device. Progressing 8/25/18  (4.)Ms. Santiago Howell will ambulate up/down 3 steps with bilateral  railing with CONTACT GUARD ASSIST with no device. D/C goal - going to rehab  (5.)Ms. Santiago Howell will increase right knee ROM to 5°-80°. Progressing 8/25/18  ________________________________________________________________________________________________      PHYSICAL THERAPY Joint camp tKa: Daily Note, AM 8/25/2018  INPATIENT: Hospital Day: 3  [place] / Product Type: Medicare /      NAME/AGE/GENDER: Ayo Zuniga is a 70 y.o. female   PRIMARY DIAGNOSIS:  Primary osteoarthritis of right knee [M17.11]   Procedure(s) and Anesthesia Type:     * RIGHT KNEE ARTHROPLASTY TOTAL/DEPUY - Spinal (Right)  ICD-10: Treatment Diagnosis:    · Pain in Right Knee (M25.561)  · Stiffness of Right Knee, Not elsewhere classified (M25.661)  · Difficulty in walking, Not elsewhere classified (R26.2)      ASSESSMENT:     Ms. Santiago Howell participated well with group session. Ambulating well with use of walker. Able to perform exercises without assist.  Plans for rehab tomorrow. This section established at most recent assessment   PROBLEM LIST (Impairments causing functional limitations):  1. Decreased Strength  2. Decreased ADL/Functional Activities  3. Decreased Transfer Abilities  4. Decreased Ambulation Ability/Technique  5. Decreased Flexibility/Joint Mobility  6. Edema/Girth  7.  Decreased [place] with Home Exercise Program   INTERVENTIONS PLANNED: (Benefits and precautions of physical therapy have been discussed with the patient.)  1. Bed Mobility  2. Cold  3. Gait Training  4. Home Exercise Program (HEP)  5. Therapeutic Exercise/Strengthening  6. Transfer Training  7. Range of Motion: active/assisted/passive  8. Therapeutic Activities  9. Group Therapy     TREATMENT PLAN: Frequency/Duration: Follow patient BID for duration of hospital stay to address above goals. Rehabilitation Potential For Stated Goals: Good     RECOMMENDED REHABILITATION/EQUIPMENT: (at time of discharge pending progress): Continue Skilled Therapy and pt wants to pursue rehab. HISTORY:   History of Present Injury/Illness (Reason for Referral):  Pt s/p right TKA on 18  Past Medical History/Comorbidities:   Ms. Pako Orlando  has a past medical history of Atrial septal aneurysm; CAD (coronary artery disease); Dyslipidemia; Heart murmur; History of breast cancer (); Hypertension; Osteoarthritis; Paroxysmal atrial fibrillation ([address] Patent foramen ovale; and Valvular heart disease. She also has no past medical history of Adverse effect of anesthesia; Asthma; Autoimmune disease ([address] Chronic kidney disease; Chronic obstructive pulmonary disease ([address] Chronic pain; Coagulation disorder ([address] Diabetes ([address] Difficult intubation; GERD (gastroesophageal reflux disease); Heart failure ([address] Ill-defined condition; Liver disease; Malignant hyperthermia due to anesthesia; Morbid obesity ([address] Nausea & vomiting; Pseudocholinesterase deficiency; Psychiatric disorder; PUD (peptic ulcer disease); Seizures ([address] Stroke [facility]); Thromboembolus ([address] Thyroid disease; Unspecified adverse effect of anesthesia; or Unspecified sleep apnea. Ms. Pako Orlando  has a past surgical history that includes hx mastectomy (Left, ); hx hysterectomy (); hx  section; hx cholecystectomy (); hx cataract removal (Bilateral); hx back surgery; hx rotator cuff repair (Left, ); and hx other surgical ().   Social History/Living Environment:   Home Environment: Private residence  # Steps to Enter: 0  One/Two Story Residence: One story  Living Alone: Yes  Support Systems: Family member(s)  Patient Expects to be Discharged to[de-identified] Rehabilitation facility  Current DME Used/Available at Home: Cane, straight, Raised toilet seat, Walker, rolling  Tub or Shower Type: Shower  Prior Level of Function/Work/Activity:  Pt living at home, independent with gait with a walker, independent with ADLs   Number of Personal Factors/Comorbidities that affect the Plan of Care: 0: LOW COMPLEXITY   EXAMINATION:   Most Recent Physical Functioning:      Gross Assessment  AROM: Within functional limits (L LE)  Strength: Within functional limits (L LE)        RLE AROM  R Knee Flexion: 80  R Knee Extension: 8       RLE Strength  R Hip Flexion: 2  R Knee Flexion: 3-  R Knee Extension: 2+         Transfers  Sit to Stand: Stand-by assistance  Stand to Sit: Stand-by assistance    Balance  Sitting: Intact  Standing: With support              Weight Bearing Status  Right Side Weight Bearing: As tolerated  Distance (ft): 104 Feet (ft) (x 2)  Ambulation - Level of Assistance: Stand-by assistance  Assistive Device: Walker, rolling  Speed/Ariela: Pace decreased (<100 feet/min)  Step Length: Left shortened;Right shortened  Stance: Right decreased  Gait Abnormalities: Antalgic  Interventions: Safety awareness training;Verbal cues     Braces/Orthotics: none    Right Knee Cold  Type: Cryocuff      Body Structures Involved:  1. Joints  2. Muscles Body Functions Affected:  1. Movement Related Activities and Participation Affected:  1. Mobility  2. Self Care   Number of elements that affect the Plan of Care: 4+: HIGH COMPLEXITY   CLINICAL PRESENTATION:   Presentation: Stable and uncomplicated: LOW COMPLEXITY   CLINICAL DECISION MAKING:   [employer] AM-PAC 6 Clicks   Basic Mobility Inpatient Short Form  How much difficulty does the patient currently have. ..  Unable A Lot A Little None   1. Turning over in bed (including adjusting bedclothes, sheets and blankets)? [] 1   [] 2   [x] 3   [] 4   2. Sitting down on and standing up from a chair with arms ( e.g., wheelchair, bedside commode, etc.)   [] 1   [] 2   [x] 3   [] 4   3. Moving from lying on back to sitting on the side of the bed? [] 1   [] 2   [x] 3   [] 4   How much help from another person does the patient currently need. .. Total A Lot A Little None   4. Moving to and from a bed to a chair (including a wheelchair)? [] 1   [] 2   [x] 3   [] 4   5. Need to walk in hospital room? [] 1   [] 2   [x] 3   [] 4   6. Climbing 3-5 steps with a railing? [] 1   [] 2   [x] 3   [] 4   © 2007, Trustees of [address] [place] [id number], under license to [employer] All rights reserved        Score:  Initial: 18 Most Recent: X (Date: -- )    Interpretation of Tool:  Represents activities that are increasingly more difficult (i.e. Bed mobility, Transfers, Gait). Score 24 23 22-20 19-15 14-10 9-7 6     Modifier CH CI CJ CK CL CM CN      ? Mobility - Walking and Moving Around:     - CURRENT STATUS: CK - 40%-59% impaired, limited or restricted    - GOAL STATUS: CI - 1%-19% impaired, limited or restricted    - D/C STATUS:  ---------------To be determined---------------  [place] / Product Type: Medicare /      Medical Necessity:     · Patient is expected to demonstrate progress in strength, range of motion and functional technique to decrease assistance required with functional mobility and TKA exercises. Reason for Services/Other Comments:  · Patient continues to require skilled intervention due to inability to complete functional mobility and TKA exercises.    Use of outcome tool(s) and clinical judgement create a POC that gives a: Clear prediction of patient's progress: LOW COMPLEXITY            TREATMENT:   (In addition to Assessment/Re-Assessment sessions the following treatments were rendered)     Pre-treatment Symptoms/Complaints: Patient ready for group session. Pain Initial:   Pain Intensity 1: 5  Pain Location 1: Knee  Pain Orientation 1: Right  Pain Intervention(s) 1: Ambulation/Increased Activity, Cold pack, Exercise  Post Session:  6/10     Therapeutic Exercise: (45 Minutes (group)):  Exercises per grid below to improve mobility and strength. Required minimal verbal cues to promote proper body alignment and promote proper body posture. Progressed repetitions as indicated. Gait Training (15 Minutes):  Gait training to improve and/or restore physical functioning as related to mobility and strength. Ambulated 104 Feet (ft) (x 2) with Stand-by assistance using a Walker, rolling and minimal Safety awareness training;Verbal cues related to their stance phase and stride length to promote proper body alignment and promote proper body posture. Instruction in performance of walker use and gait sequencing to correct stance phase and stride length. Date:  8/23/18 Date:  8/24/18 Date:  8/25/18   ACTIVITY/EXERCISE AM PM AM PM AM PM   GROUP THERAPY  []  []  []  [x]  [x]  []   Ankle Pumps  10 12 15 20    Quad Sets  10 12 15 20    Gluteal Sets  10 12 15 20    Hip ABd/ADduction  10 12 15 20    Straight Leg Raises  10 12 15 20    Knee Slides  10 12 15 20    Short Arc Quads    15 20    Long Arc Quads         Chair Slides    15 20             B = bilateral; AA = active assistive; A = active; P = passive      Treatment/Session Assessment:     Response to Treatment:  Patient participated well this am.  Moving well with walker and good demonstration of exercises.      Education:  [x] Home Exercises  [x] Fall Precautions   [x] D/C Instruction Review  [x] Knee Prosthesis Review  [x] Walker Management/Safety [] Adaptive Equipment as Needed       Interdisciplinary Collaboration:   o Physical Therapist  o Registered Nurse  o Rehabilitation Attendant    After treatment position/precautions:   o Up in chair  o Bed/Chair-wheels locked  o Bed in low position  o Call light within reach    Compliance with Program/Exercises: compliant all of the time. Recommendations/Intent for next treatment session:  Treatment next visit will focus on increasing Ms. Gary's independence with bed mobility, transfers, gait training, strength/ROM exercises, modalities for pain, and patient education.       Total Treatment Duration:  PT Patient Time In/Time Out  Time In: 1030  Time Out: 2500 Dustin Goodwin PT

## 2018-08-26 VITALS
HEART RATE: 53 BPM | RESPIRATION RATE: 12 BRPM | OXYGEN SATURATION: 100 % | DIASTOLIC BLOOD PRESSURE: 68 MMHG | TEMPERATURE: 98.2 F | BODY MASS INDEX: 29.04 KG/M2 | SYSTOLIC BLOOD PRESSURE: 132 MMHG | HEIGHT: 65 IN | WEIGHT: 174.3 LBS

## 2018-08-26 LAB
INR PPP: 1.8
MM INDURATION POC: 0 MM (ref 0–5)
PPD POC: NORMAL NEGATIVE
PROTHROMBIN TIME: 21.6 SEC (ref 11.5–14.5)

## 2018-08-26 PROCEDURE — 36415 COLL VENOUS BLD VENIPUNCTURE: CPT

## 2018-08-26 PROCEDURE — 74011250637 HC RX REV CODE- 250/637: Performed by: HOSPITALIST

## 2018-08-26 PROCEDURE — 97116 GAIT TRAINING THERAPY: CPT

## 2018-08-26 PROCEDURE — 85610 PROTHROMBIN TIME: CPT

## 2018-08-26 PROCEDURE — 74011250637 HC RX REV CODE- 250/637: Performed by: ORTHOPAEDIC SURGERY

## 2018-08-26 PROCEDURE — 97110 THERAPEUTIC EXERCISES: CPT

## 2018-08-26 RX ADMIN — OXYCODONE HYDROCHLORIDE 10 MG: 5 TABLET ORAL at 08:38

## 2018-08-26 RX ADMIN — OXYCODONE HYDROCHLORIDE 10 MG: 5 TABLET ORAL at 11:51

## 2018-08-26 RX ADMIN — AMIODARONE HYDROCHLORIDE 200 MG: 200 TABLET ORAL at 08:38

## 2018-08-26 RX ADMIN — ASPIRIN 81 MG: 81 TABLET, DELAYED RELEASE ORAL at 08:38

## 2018-08-26 RX ADMIN — ACETAMINOPHEN 1000 MG: 500 TABLET, FILM COATED ORAL at 11:51

## 2018-08-26 RX ADMIN — ACETAMINOPHEN 1000 MG: 500 TABLET, FILM COATED ORAL at 08:38

## 2018-08-26 RX ADMIN — DOCUSATE SODIUM AND SENNOSIDES 2 TABLET: 8.6; 5 TABLET, FILM COATED ORAL at 08:38

## 2018-08-26 RX ADMIN — CELECOXIB 200 MG: 200 CAPSULE ORAL at 08:38

## 2018-08-26 RX ADMIN — BENAZEPRIL HYDROCHLORIDE 20 MG: 10 TABLET ORAL at 08:38

## 2018-08-26 NOTE — PROGRESS NOTES
TRANSFER - OUT REPORT:    Verbal report given to Ever RN (name) on Ashlyn Cespedes  being transferred to [facility]) for routine progression of care       Report consisted of patients Situation, Background, Assessment and   Recommendations(SBAR). Information from the following report(s) SBAR, Kardex, OR Summary, Procedure Summary, Intake/Output, MAR, Accordion and Recent Results was reviewed with the receiving nurse. Lines:       Opportunity for questions and clarification was provided. CHAR MD WANTS TO HOLD COUMADIN UNTIL Tuesday AND ALLOW FOR INR TO BE DRAWN PRIOR TO RESTARTING.

## 2018-08-26 NOTE — PROGRESS NOTES
2018         Post Op day: 3 Days Post-Op     Admit Date: 2018  Admit Diagnosis: Primary osteoarthritis of right knee [M17.11]        Subjective: Doing well, No complaints, No SOB, No Chest Pain, No Nausea or Vomiting     Objective:   Vital Signs are Stable, No Acute Distress, Alert and Oriented, Dressing is Dry,  Neurovascular exam is normal.     Assessment / Plan :  Patient Active Problem List   Diagnosis Code    HTN (hypertension) I10    Personal history of breast cancer Z85.3    Hyperlipidemia E78.5    Atrial fibrillation [address] I48.91    Acute cholecystitis K81.0    Osteoarthritis M19.90    S/P total knee arthroplasty Z96.659    Perineal abscess L02.215    Infection of perineal wound T81. 4XXA    S/P total knee arthroplasty, right Z96.651    Patient Vitals for the past 8 hrs:   BP Temp Pulse Resp SpO2   18 0400 110/56 97.5 °F (36.4 °C) (!) 53 18 98 %    Temp (24hrs), Av.7 °F (36.5 °C), Min:97.5 °F (36.4 °C), Max:97.8 °F (36.6 °C)    Body mass index is 29.01 kg/(m^2). Lab Results   Component Value Date/Time    HGB 10.0 (L) 2018 05:10 AM      Pt seen by and discussed with Supervising Physician   Continue PT  Will continue to monitor INR. Will hold Lovenox. INR is currently ideal for patient s/p TKA.   To rehab today     Signed By: LISA Lyons

## 2018-08-26 NOTE — PROGRESS NOTES
Shift Assessment Complete. Pt is post op day 3 from [facility]. Pt is A&Ox 3.  +2 pedal pulses with purposeful movement in all four extremities. Dressing is clean, dry and intact. PIV capped. Pt c/o pain. See MAr . Bed low and locked. Side rails x3. Call light with in reach. Pt verbalizes understanding of call light.

## 2018-08-26 NOTE — PROGRESS NOTES
Problem: Mobility Impaired (Adult and Pediatric)  Goal: *Acute Goals and Plan of Care (Insert Text)  GOALS (1-4 days):  (1.)Ms. Sonia church will move from supine to sit and sit to supine  in bed with STAND BY ASSIST.  (2.)Ms. Sonia church will transfer from bed to chair and chair to bed with STAND BY ASSIST using the least restrictive device. (3.)Ms. Sonia church will ambulate with STAND BY ASSIST for 250 feet with the least restrictive device. Progressing 8/25/18  (4.)Ms. Sonia church will ambulate up/down 3 steps with bilateral  railing with CONTACT GUARD ASSIST with no device. D/C goal - going to rehab  (5.)Ms. Sonia church will increase right knee ROM to 5°-80°. Progressing 8/25/18  ________________________________________________________________________________________________      PHYSICAL THERAPY Joint camp tKa: Daily Note, AM 8/26/2018  INPATIENT: Hospital Day: 4  [place] / Product Type: Medicare /      NAME/AGE/GENDER: Jesús Jiang is a 70 y.o. female   PRIMARY DIAGNOSIS:  Primary osteoarthritis of right knee [M17.11]   Procedure(s) and Anesthesia Type:     * RIGHT KNEE ARTHROPLASTY TOTAL/DEPUY - Spinal (Right)  ICD-10: Treatment Diagnosis:    · Pain in Right Knee (M25.561)  · Stiffness of Right Knee, Not elsewhere classified (M25.661)  · Difficulty in walking, Not elsewhere classified (R26.2)      ASSESSMENT:     Ms. Sonia church participated well this morning. Continues to complain of stiffness and swelling but reports it has moved. Patient moving slowly but with supervision only. Decreased knee flexion ROM but extension improved. Still needing some assist with straight leg raise. Patient to transfer to rehab today. This section established at most recent assessment   PROBLEM LIST (Impairments causing functional limitations):  1. Decreased Strength  2. Decreased ADL/Functional Activities  3. Decreased Transfer Abilities  4. Decreased Ambulation Ability/Technique  5.  Decreased Flexibility/Joint Mobility  6. Edema/Girth  7. Decreased [place] with Home Exercise Program   INTERVENTIONS PLANNED: (Benefits and precautions of physical therapy have been discussed with the patient.)  1. Bed Mobility  2. Cold  3. Gait Training  4. Home Exercise Program (HEP)  5. Therapeutic Exercise/Strengthening  6. Transfer Training  7. Range of Motion: active/assisted/passive  8. Therapeutic Activities  9. Group Therapy     TREATMENT PLAN: Frequency/Duration: Follow patient BID for duration of hospital stay to address above goals. Rehabilitation Potential For Stated Goals: Good     RECOMMENDED REHABILITATION/EQUIPMENT: (at time of discharge pending progress): Continue Skilled Therapy and pt wants to pursue rehab. HISTORY:   History of Present Injury/Illness (Reason for Referral):  Pt s/p right TKA on 18  Past Medical History/Comorbidities:   Ms. Warner Sykes  has a past medical history of Atrial septal aneurysm; CAD (coronary artery disease); Dyslipidemia; Heart murmur; History of breast cancer (); Hypertension; Osteoarthritis; Paroxysmal atrial fibrillation ([address] Patent foramen ovale; and Valvular heart disease. She also has no past medical history of Adverse effect of anesthesia; Asthma; Autoimmune disease ([address] Chronic kidney disease; Chronic obstructive pulmonary disease ([address] Chronic pain; Coagulation disorder ([address] Diabetes ([address] Difficult intubation; GERD (gastroesophageal reflux disease); Heart failure ([address] Ill-defined condition; Liver disease; Malignant hyperthermia due to anesthesia; Morbid obesity ([address] Nausea & vomiting; Pseudocholinesterase deficiency; Psychiatric disorder; PUD (peptic ulcer disease); Seizures ([address] Stroke [facility]); Thromboembolus ([address] Thyroid disease; Unspecified adverse effect of anesthesia; or Unspecified sleep apnea.   Ms. Warner Sykes  has a past surgical history that includes hx mastectomy (Left, ); hx hysterectomy (); hx  section; hx cholecystectomy (2014); hx cataract removal (Bilateral); hx back surgery; hx rotator cuff repair (Left, 2007); and hx other surgical (2016). Social History/Living Environment:   Home Environment: Private residence  # Steps to Enter: 0  One/Two Story Residence: One story  Living Alone: Yes  Support Systems: Family member(s)  Patient Expects to be Discharged to[de-identified] Rehabilitation facility  Current DME Used/Available at Home: Cane, straight, Raised toilet seat, Walker, rolling  Tub or Shower Type: Shower  Prior Level of Function/Work/Activity:  Pt living at home, independent with gait with a walker, independent with ADLs   Number of Personal Factors/Comorbidities that affect the Plan of Care: 0: LOW COMPLEXITY   EXAMINATION:   Most Recent Physical Functioning:      Gross Assessment  AROM: Within functional limits (L LE)  Strength: Within functional limits (L LE)        RLE AROM  R Knee Flexion: 66  R Knee Extension: 2       RLE Strength  R Hip Flexion: 2  R Knee Flexion: 3-  R Knee Extension: 3-    Bed Mobility  Supine to Sit: Supervision    Transfers  Sit to Stand: Supervision  Stand to Sit: Supervision  Bed to Chair: Supervision    Balance  Sitting: Intact  Standing: With support              Weight Bearing Status  Right Side Weight Bearing: As tolerated  Distance (ft): 220 Feet (ft)  Ambulation - Level of Assistance: Supervision  Assistive Device: Walker, rolling  Speed/Ariela: Pace decreased (<100 feet/min)  Step Length: Left shortened;Right shortened  Stance: Right decreased  Gait Abnormalities: Antalgic  Interventions: Safety awareness training;Verbal cues     Braces/Orthotics: none           Body Structures Involved:  1. Joints  2. Muscles Body Functions Affected:  1. Movement Related Activities and Participation Affected:  1. Mobility  2.  Self Care   Number of elements that affect the Plan of Care: 4+: HIGH COMPLEXITY   CLINICAL PRESENTATION:   Presentation: Stable and uncomplicated: LOW COMPLEXITY   CLINICAL DECISION MAKING:   [facility] AM-PAC 6 Clicks   Basic Mobility Inpatient Short Form  How much difficulty does the patient currently have. .. Unable A Lot A Little None   1. Turning over in bed (including adjusting bedclothes, sheets and blankets)? [] 1   [] 2   [x] 3   [] 4   2. Sitting down on and standing up from a chair with arms ( e.g., wheelchair, bedside commode, etc.)   [] 1   [] 2   [x] 3   [] 4   3. Moving from lying on back to sitting on the side of the bed? [] 1   [] 2   [x] 3   [] 4   How much help from another person does the patient currently need. .. Total A Lot A Little None   4. Moving to and from a bed to a chair (including a wheelchair)? [] 1   [] 2   [x] 3   [] 4   5. Need to walk in hospital room? [] 1   [] 2   [x] 3   [] 4   6. Climbing 3-5 steps with a railing? [] 1   [] 2   [x] 3   [] 4   © 2007, Trustees of [facility], under license to [employer]. All rights reserved        Score:  Initial: 18 Most Recent: X (Date: -- )    Interpretation of Tool:  Represents activities that are increasingly more difficult (i.e. Bed mobility, Transfers, Gait). Score 24 23 22-20 19-15 14-10 9-7 6     Modifier CH CI CJ CK CL CM CN      ? Mobility - Walking and Moving Around:     - CURRENT STATUS: CK - 40%-59% impaired, limited or restricted    - GOAL STATUS: CI - 1%-19% impaired, limited or restricted    - D/C STATUS:  ---------------To be determined---------------  [place] / Product Type: Medicare /      Medical Necessity:     · Patient is expected to demonstrate progress in strength, range of motion and functional technique to decrease assistance required with functional mobility and TKA exercises. Reason for Services/Other Comments:  · Patient continues to require skilled intervention due to inability to complete functional mobility and TKA exercises.    Use of outcome tool(s) and clinical judgement create a POC that gives a: Clear prediction of patient's progress: LOW COMPLEXITY            TREATMENT:   (In addition to Assessment/Re-Assessment sessions the following treatments were rendered)     Pre-treatment Symptoms/Complaints: Patient in the bed but agreeable to PT. Pain Initial:   Pain Intensity 1: 5  Pain Location 1: Knee  Pain Orientation 1: Right  Pain Intervention(s) 1: Ambulation/Increased Activity, Exercise  Post Session:  5/10     Therapeutic Exercise: (15 Minutes):  Exercises per grid below to improve mobility and strength. Required minimal verbal cues to promote proper body alignment and promote proper body posture. Progressed repetitions as indicated. Gait Training (15 Minutes):  Gait training to improve and/or restore physical functioning as related to mobility and strength. Ambulated 220 Feet (ft) with Supervision using a Walker, rolling and minimal Safety awareness training;Verbal cues related to their stance phase and stride length to promote proper body alignment and promote proper body posture. Instruction in performance of walker use and gait sequencing to correct stance phase and stride length. Date:  8/26/18 Date:   Date:     ACTIVITY/EXERCISE AM PM AM PM AM PM   GROUP THERAPY  []  []  []  []  []  []   Ankle Pumps 20        Quad Sets 20        Gluteal Sets 20        Hip ABd/ADduction 20        Straight Leg Raises 20 AA        Knee Slides 20        Short Arc Quads 20        Long Arc Quads         Chair Slides 20                 B = bilateral; AA = active assistive; A = active; P = passive      Treatment/Session Assessment:     Response to Treatment:  Patient participated well this morning. Moves with only S but slowly. Decreased knee flexion ROM this am but will likely improve throughout the day.        Education:  [x] Home Exercises  [x] Fall Precautions   [x] D/C Instruction Review  [x] Knee Prosthesis Review  [x] Walker Management/Safety [] Adaptive Equipment as Needed Interdisciplinary Collaboration:   o Physical Therapist  o Registered Nurse    After treatment position/precautions:   o Call light within reach  o RN notified  o pt in bathroom    Compliance with Program/Exercises: compliant all of the time. Recommendations/Intent for next treatment session:  Treatment next visit will focus on increasing Ms. Gary's independence with bed mobility, transfers, gait training, strength/ROM exercises, modalities for pain, and patient education.       Total Treatment Duration:  PT Patient Time In/Time Out  Time In: 0935  Time Out: GALE Frey

## 2018-09-12 ENCOUNTER — HOME HEALTH ADMISSION (OUTPATIENT)
Dept: HOME HEALTH SERVICES | Facility: HOME HEALTH | Age: 78
End: 2018-09-12
Payer: MEDICARE

## 2018-09-15 ENCOUNTER — HOME CARE VISIT (OUTPATIENT)
Dept: SCHEDULING | Facility: HOME HEALTH | Age: 78
End: 2018-09-15
Payer: MEDICARE

## 2018-09-15 PROCEDURE — 400013 HH SOC

## 2018-09-15 PROCEDURE — 3331090002 HH PPS REVENUE DEBIT

## 2018-09-15 PROCEDURE — 3331090001 HH PPS REVENUE CREDIT

## 2018-09-15 PROCEDURE — G0299 HHS/HOSPICE OF RN EA 15 MIN: HCPCS

## 2018-09-16 VITALS
BODY MASS INDEX: 31.32 KG/M2 | RESPIRATION RATE: 18 BRPM | WEIGHT: 188 LBS | HEART RATE: 55 BPM | DIASTOLIC BLOOD PRESSURE: 89 MMHG | HEIGHT: 65 IN | SYSTOLIC BLOOD PRESSURE: 158 MMHG | TEMPERATURE: 97.9 F | OXYGEN SATURATION: 96 %

## 2018-09-16 PROCEDURE — 3331090002 HH PPS REVENUE DEBIT

## 2018-09-16 PROCEDURE — 3331090001 HH PPS REVENUE CREDIT

## 2018-09-17 ENCOUNTER — HOME CARE VISIT (OUTPATIENT)
Dept: HOME HEALTH SERVICES | Facility: HOME HEALTH | Age: 78
End: 2018-09-17
Payer: MEDICARE

## 2018-09-17 ENCOUNTER — HOME CARE VISIT (OUTPATIENT)
Dept: SCHEDULING | Facility: HOME HEALTH | Age: 78
End: 2018-09-17
Payer: MEDICARE

## 2018-09-17 VITALS
DIASTOLIC BLOOD PRESSURE: 62 MMHG | SYSTOLIC BLOOD PRESSURE: 158 MMHG | HEART RATE: 58 BPM | OXYGEN SATURATION: 97 % | RESPIRATION RATE: 18 BRPM | TEMPERATURE: 97.7 F

## 2018-09-17 VITALS
SYSTOLIC BLOOD PRESSURE: 212 MMHG | DIASTOLIC BLOOD PRESSURE: 88 MMHG | RESPIRATION RATE: 18 BRPM | TEMPERATURE: 98.3 F | HEART RATE: 58 BPM

## 2018-09-17 LAB
INR BLD: 1.6 (ref 0.9–1.1)
PT POC: 19 SECONDS (ref 11.8–14.9)

## 2018-09-17 PROCEDURE — 3331090001 HH PPS REVENUE CREDIT

## 2018-09-17 PROCEDURE — G0299 HHS/HOSPICE OF RN EA 15 MIN: HCPCS

## 2018-09-17 PROCEDURE — 3331090002 HH PPS REVENUE DEBIT

## 2018-09-17 PROCEDURE — G0151 HHCP-SERV OF PT,EA 15 MIN: HCPCS

## 2018-09-18 PROCEDURE — 3331090002 HH PPS REVENUE DEBIT

## 2018-09-18 PROCEDURE — 3331090001 HH PPS REVENUE CREDIT

## 2018-09-19 ENCOUNTER — HOME CARE VISIT (OUTPATIENT)
Dept: SCHEDULING | Facility: HOME HEALTH | Age: 78
End: 2018-09-19
Payer: MEDICARE

## 2018-09-19 VITALS — DIASTOLIC BLOOD PRESSURE: 72 MMHG | TEMPERATURE: 98.3 F | SYSTOLIC BLOOD PRESSURE: 132 MMHG | HEART RATE: 59 BPM

## 2018-09-19 PROCEDURE — 3331090002 HH PPS REVENUE DEBIT

## 2018-09-19 PROCEDURE — G0152 HHCP-SERV OF OT,EA 15 MIN: HCPCS

## 2018-09-19 PROCEDURE — 3331090001 HH PPS REVENUE CREDIT

## 2018-09-20 ENCOUNTER — HOME CARE VISIT (OUTPATIENT)
Dept: SCHEDULING | Facility: HOME HEALTH | Age: 78
End: 2018-09-20
Payer: MEDICARE

## 2018-09-20 VITALS
SYSTOLIC BLOOD PRESSURE: 148 MMHG | DIASTOLIC BLOOD PRESSURE: 80 MMHG | HEART RATE: 56 BPM | TEMPERATURE: 97 F | OXYGEN SATURATION: 98 % | RESPIRATION RATE: 17 BRPM

## 2018-09-20 VITALS
SYSTOLIC BLOOD PRESSURE: 140 MMHG | TEMPERATURE: 97.8 F | DIASTOLIC BLOOD PRESSURE: 68 MMHG | HEART RATE: 61 BPM | OXYGEN SATURATION: 98 % | RESPIRATION RATE: 18 BRPM

## 2018-09-20 LAB
INR BLD: 1.4 (ref 0.9–1.1)
PT POC: 0 SECONDS (ref 11.8–14.9)

## 2018-09-20 PROCEDURE — 3331090001 HH PPS REVENUE CREDIT

## 2018-09-20 PROCEDURE — G0157 HHC PT ASSISTANT EA 15: HCPCS

## 2018-09-20 PROCEDURE — 3331090002 HH PPS REVENUE DEBIT

## 2018-09-20 PROCEDURE — G0299 HHS/HOSPICE OF RN EA 15 MIN: HCPCS

## 2018-09-21 PROCEDURE — 3331090001 HH PPS REVENUE CREDIT

## 2018-09-21 PROCEDURE — 3331090002 HH PPS REVENUE DEBIT

## 2018-09-22 PROCEDURE — 3331090002 HH PPS REVENUE DEBIT

## 2018-09-22 PROCEDURE — 3331090001 HH PPS REVENUE CREDIT

## 2018-09-23 PROCEDURE — 3331090001 HH PPS REVENUE CREDIT

## 2018-09-23 PROCEDURE — 3331090002 HH PPS REVENUE DEBIT

## 2018-09-24 ENCOUNTER — HOME CARE VISIT (OUTPATIENT)
Dept: SCHEDULING | Facility: HOME HEALTH | Age: 78
End: 2018-09-24
Payer: MEDICARE

## 2018-09-24 VITALS
OXYGEN SATURATION: 97 % | HEART RATE: 64 BPM | RESPIRATION RATE: 17 BRPM | SYSTOLIC BLOOD PRESSURE: 140 MMHG | TEMPERATURE: 97.8 F | DIASTOLIC BLOOD PRESSURE: 80 MMHG

## 2018-09-24 PROCEDURE — G0299 HHS/HOSPICE OF RN EA 15 MIN: HCPCS

## 2018-09-24 PROCEDURE — 3331090001 HH PPS REVENUE CREDIT

## 2018-09-24 PROCEDURE — G0157 HHC PT ASSISTANT EA 15: HCPCS

## 2018-09-24 PROCEDURE — 3331090002 HH PPS REVENUE DEBIT

## 2018-09-25 VITALS
OXYGEN SATURATION: 97 % | DIASTOLIC BLOOD PRESSURE: 80 MMHG | TEMPERATURE: 97.9 F | SYSTOLIC BLOOD PRESSURE: 154 MMHG | RESPIRATION RATE: 18 BRPM | HEART RATE: 56 BPM

## 2018-09-25 PROCEDURE — 3331090002 HH PPS REVENUE DEBIT

## 2018-09-25 PROCEDURE — 3331090001 HH PPS REVENUE CREDIT

## 2018-09-26 PROCEDURE — 3331090001 HH PPS REVENUE CREDIT

## 2018-09-26 PROCEDURE — 3331090002 HH PPS REVENUE DEBIT

## 2018-09-27 ENCOUNTER — HOME CARE VISIT (OUTPATIENT)
Dept: SCHEDULING | Facility: HOME HEALTH | Age: 78
End: 2018-09-27
Payer: MEDICARE

## 2018-09-27 ENCOUNTER — HOME CARE VISIT (OUTPATIENT)
Dept: HOME HEALTH SERVICES | Facility: HOME HEALTH | Age: 78
End: 2018-09-27
Payer: MEDICARE

## 2018-09-27 VITALS
HEART RATE: 60 BPM | RESPIRATION RATE: 17 BRPM | TEMPERATURE: 97.9 F | DIASTOLIC BLOOD PRESSURE: 80 MMHG | SYSTOLIC BLOOD PRESSURE: 138 MMHG | OXYGEN SATURATION: 97 %

## 2018-09-27 PROCEDURE — 3331090001 HH PPS REVENUE CREDIT

## 2018-09-27 PROCEDURE — G0157 HHC PT ASSISTANT EA 15: HCPCS

## 2018-09-27 PROCEDURE — 3331090002 HH PPS REVENUE DEBIT

## 2018-09-28 PROCEDURE — 3331090001 HH PPS REVENUE CREDIT

## 2018-09-28 PROCEDURE — 3331090002 HH PPS REVENUE DEBIT

## 2018-09-29 PROCEDURE — 3331090001 HH PPS REVENUE CREDIT

## 2018-09-29 PROCEDURE — 3331090002 HH PPS REVENUE DEBIT

## 2018-09-30 PROCEDURE — 3331090001 HH PPS REVENUE CREDIT

## 2018-09-30 PROCEDURE — 3331090002 HH PPS REVENUE DEBIT

## 2018-10-01 PROCEDURE — 3331090002 HH PPS REVENUE DEBIT

## 2018-10-01 PROCEDURE — 3331090001 HH PPS REVENUE CREDIT

## 2018-10-02 ENCOUNTER — HOME CARE VISIT (OUTPATIENT)
Dept: SCHEDULING | Facility: HOME HEALTH | Age: 78
End: 2018-10-02
Payer: MEDICARE

## 2018-10-02 VITALS
HEART RATE: 60 BPM | DIASTOLIC BLOOD PRESSURE: 80 MMHG | TEMPERATURE: 98.3 F | RESPIRATION RATE: 17 BRPM | SYSTOLIC BLOOD PRESSURE: 138 MMHG

## 2018-10-02 PROCEDURE — 3331090001 HH PPS REVENUE CREDIT

## 2018-10-02 PROCEDURE — 3331090002 HH PPS REVENUE DEBIT

## 2018-10-02 PROCEDURE — G0157 HHC PT ASSISTANT EA 15: HCPCS

## 2018-10-03 ENCOUNTER — HOME CARE VISIT (OUTPATIENT)
Dept: SCHEDULING | Facility: HOME HEALTH | Age: 78
End: 2018-10-03
Payer: MEDICARE

## 2018-10-03 VITALS
DIASTOLIC BLOOD PRESSURE: 82 MMHG | RESPIRATION RATE: 18 BRPM | SYSTOLIC BLOOD PRESSURE: 180 MMHG | HEART RATE: 60 BPM | TEMPERATURE: 97.3 F

## 2018-10-03 PROCEDURE — 3331090001 HH PPS REVENUE CREDIT

## 2018-10-03 PROCEDURE — 3331090002 HH PPS REVENUE DEBIT

## 2018-10-03 PROCEDURE — G0151 HHCP-SERV OF PT,EA 15 MIN: HCPCS

## 2018-10-08 ENCOUNTER — HOSPITAL ENCOUNTER (OUTPATIENT)
Dept: PHYSICAL THERAPY | Age: 78
Discharge: HOME | End: 2018-10-08
Payer: MEDICARE

## 2018-10-08 PROCEDURE — 97110 THERAPEUTIC EXERCISES: CPT

## 2018-10-08 PROCEDURE — 97161 PT EVAL LOW COMPLEX 20 MIN: CPT

## 2018-10-08 PROCEDURE — 97140 MANUAL THERAPY 1/> REGIONS: CPT

## 2018-10-08 PROCEDURE — G8979 MOBILITY GOAL STATUS: HCPCS

## 2018-10-08 PROCEDURE — G8978 MOBILITY CURRENT STATUS: HCPCS

## 2018-10-08 NOTE — THERAPY EVALUATION
Alvarado Jensen  : 1940      [place] / [place] / Product Type: Medicare /  [address] at [place] [address]. [facility], [address], [facility], [id number] [place] [address] Road  [phone]   [phone]        OUTPATIENT PHYSICAL THERAPY:  Initial Assessment and Treatment Day: Day of Assessment 10/9/2018      ICD-10: Treatment Diagnosis: Pain in Right Knee (M25.561)  Stiffness of Right Knee, Not elsewhere classified (M25.661)  Difficulty in walking, Not elsewhere classified (R26.2)  effusion right knee joint (M25.461)    Precautions/Allergies:   Sulfa (sulfonamide antibiotics) and Tape [adhesive]   Fall Risk Score: 1 (? 5 = High Risk)  MD Orders: Eval and Treat  MEDICAL/REFERRING DIAGNOSIS:  Presence of right artificial knee joint   DATE OF ONSET: 18  REFERRING PHYSICIAN: Ian Barraza MD  RETURN PHYSICIAN APPOINTMENT: TBD by patient     INITIAL ASSESSMENT:  Ms. Alvarado Jensen demonstrates functional limitations consistent with 6 weeks s/p R TKA. PT evaluation reveals decreased strength, decreased ROM, gait abnormality, pain and edema of right knee joint. Pt will benefit from skilled PT including  manual therapeutic techniques to decrease edema and improve joint mobility,  therapeutic exercises and activities, balance and comprehensive home exercises program to address current impairment. PROBLEM LIST (Impacting functional limitations):  1. Decreased Strength  2. Decreased ADL/Functional Activities  3. Decreased Transfer Abilities  4. Decreased Ambulation Ability/Technique  5. Decreased Balance  6. Increased Pain  7. Decreased Activity Tolerance  8. Decreased [place] with Home Exercise Program INTERVENTIONS PLANNED:  1. Balance Exercise  2. Bed Mobility  3. Cold  4. Cryotherapy  5. Family Education  6. Gait Training  7. Heat  8. Home Exercise Program (HEP)  9. Manual Therapy  10. Neuromuscular Re-education/Strengthening  11.  Range of Motion (ROM)  12. Therapeutic Activites  13. Therapeutic Exercise/Strengthening  14. Transfer Training  15. Ultrasound (US)     TREATMENT PLAN:  Effective Dates: 10/8/2018 TO 1/6/2019 (90 days). Frequency/Duration: 2 times a week for 90 Days    GOALS: (Goals have been discussed and agreed upon with patient.)  SHORT-TERM FUNCTIONAL GOALS: Time Frame: 2-4 weeks   1. Pt will be independent with HEP. 2. Pt will report R knee pain in no longer constant. 3. Pt will demonstrate mid patellar girth measurements within 1 cm bilaterally. 4. Pt will ambulate safely on all surfaces without use of cane reporting no tripping, falling, LOB at least 2 weeks. DISCHARGE GOALS: Time Frame: 6-8 weeks   1. Pt will improve LEFS score by at least 10 points. 2. Pt will sit to stand TF 5/5 times without use of UE.   3. Pt will ambulate stairs with recip gait pattern, ascending and descending no hand rail safely    4. Pt will demonstrate improved strength of right LE to at least 4+/5 without pain during testing. REHABILITATION POTENTIAL FOR STATED GOALS: Good    Regarding Veronika Gary's therapy, I certify that the treatment plan above will be carried out by a therapist or under their direction. Thank you for this referral,  Eunice Shah, PT     Referring Physician Signature: Felicia Nunez MD              Date                    HISTORY:   History of Present Injury/Illness (Reason for Referral):  Pt states right knee pain since March 2018, conservative treatments failed leading to R TKA on 8/23/18. In hospital 2 nights, [facility] for 3 weeks then HHPT 2 weeks. Continues to perform HEP independently currently. Pt reports doing well overall at this point, gait steadily improving but using sc for community distances, no assist in home. Driving independently. Chief c/o's pain and swelling.          Current symptoms:  Constant anterior knee pain and scar sensitive, weakness B LE, edema     · Aggravating factors: disrupting sleep, stairs, knee extension, sitting prolonged     · Relieving factors:  Tylenol, ice, tramadol    Pain level:  5/10 presently, 5/10 worst, 3/10 best     Past Medical History/Comorbidities:   Ms. Matty Richards  has a past medical history of Atrial septal aneurysm; CAD (coronary artery disease); Dyslipidemia; Heart murmur; History of breast cancer (); Hypertension; Osteoarthritis; Paroxysmal atrial fibrillation ([address] Patent foramen ovale; and Valvular heart disease. She also has no past medical history of Adverse effect of anesthesia; Asthma; Autoimmune disease ([address] Chronic kidney disease; Chronic obstructive pulmonary disease ([address] Chronic pain; Coagulation disorder ([address] Diabetes ([address] Difficult intubation; GERD (gastroesophageal reflux disease); Heart failure ([address] Ill-defined condition; Liver disease; Malignant hyperthermia due to anesthesia; Morbid obesity ([address] Nausea & vomiting; Pseudocholinesterase deficiency; Psychiatric disorder; PUD (peptic ulcer disease); Seizures ([address] Stroke [facility]); Thromboembolus ([address] Thyroid disease; Unspecified adverse effect of anesthesia; or Unspecified sleep apnea. Ms. Matty Richards  has a past surgical history that includes hx mastectomy (Left, ); hx hysterectomy (); hx  section; hx cholecystectomy (); hx cataract removal (Bilateral); hx back surgery; hx rotator cuff repair (Left, ); and hx other surgical ().     Active Ambulatory Problems     Diagnosis Date Noted    HTN (hypertension) 2014    Personal history of breast cancer 2014    Hyperlipidemia 2014    Atrial fibrillation ([address] 2014    Acute cholecystitis 2014    Osteoarthritis 2015    S/P total knee arthroplasty 2015    Perineal abscess 2016    Infection of perineal wound 2016    S/P total knee arthroplasty, right 2018     Resolved Ambulatory Problems     Diagnosis Date Noted    No Resolved Ambulatory Problems     Past Medical History:   Diagnosis Date    Atrial septal aneurysm     CAD (coronary artery disease)     Dyslipidemia     Heart murmur     History of breast cancer 1990    Hypertension     Osteoarthritis     Paroxysmal atrial fibrillation (HCC)     Patent foramen ovale     Valvular heart disease      Social History/Living Environment:        Social History     Social History    Marital status:      Spouse name: N/A    Number of children: N/A    Years of education: N/A     Occupational History    Not on file. Social History Main Topics    Smoking status: Never Smoker    Smokeless tobacco: Never Used    Alcohol use No    Drug use: No    Sexual activity: Not on file     Other Topics Concern    Not on file     Social History Narrative     Prior Level of Function/Work/Activity:  Retired   Dominant Side:         LEFT  Current Medications:    Current Outpatient Prescriptions:     hydroCHLOROthiazide (HYDRODIURIL) 25 mg tablet, Take 25 mg by mouth daily. , Disp: , Rfl:     amiodarone (CORDARONE) 200 mg tablet, Take 200 mg by mouth daily. Indications: VENTRICULAR RATE CONTROL IN ATRIAL FIBRILLATION, Disp: , Rfl:     traMADol (ULTRAM) 50 mg tablet, Take 50 mg by mouth every six (6) hours as needed for Pain. pt may take 1-2 tabs as needed for pain, Disp: , Rfl:     methocarbamol (ROBAXIN) 750 mg tablet, Take 750 mg by mouth four (4) times daily as needed (muscle relaxer). as needed for muscle spasms, Disp: , Rfl:     apixaban (ELIQUIS) 5 mg tablet, Take 5 mg by mouth every twelve (12) hours. , Disp: , Rfl:     celecoxib (CELEBREX) 200 mg capsule, Take 200 mg by mouth two (2) times a day., Disp: , Rfl:     warfarin (COUMADIN) 2 mg tablet, Take 2 mg by mouth daily. , Disp: , Rfl:     senna (SENOKOT) 8.6 mg tablet, Take 2 Tabs by mouth daily. , Disp: , Rfl:     oxyCODONE (OXYIR) 5 mg capsule, Take 5 mg by mouth every four (4) hours as needed for Pain., Disp: , Rfl:     magnesium 250 mg tab, Take 1 Tab by mouth nightly., Disp: , Rfl:     multivitamin (ONE A DAY) tablet, Take 1 Tab by mouth daily. , Disp: , Rfl:     acetaminophen (TYLENOL EXTRA STRENGTH) 500 mg tablet, Take 500 mg by mouth every six (6) hours as needed for Pain., Disp: , Rfl:     calcium-cholecalciferol, d3, 600-125 mg-unit tab, Take 1 Tab by mouth daily. , Disp: , Rfl:     fenofibrate (TRICOR) 160 mg tablet, Take 160 mg by mouth nightly., Disp: , Rfl:     rosuvastatin (CRESTOR) 10 mg tablet, Take 10 mg by mouth nightly., Disp: , Rfl:     benazepril (LOTENSIN) 20 mg tablet, Take 20 mg by mouth daily.  Indications: HYPERTENSION, Disp: , Rfl:      Date Last Reviewed:  10/9/2018   Number of Personal Factors/Comorbidities that affect the Plan of Care: 0: LOW COMPLEXITY   EXAMINATION:   Observation/Orthostatic Postural Assessment:    Decreased weight bearing through right LE    Palpation:    Moderate edema noted of right knee     Girth Measurement:   Joint: Eval Date: 10/8/18  Re-Assess Date:  Re-Assess Date:    Mid patellar  Measurement RIGHT LEFT RIGHT LEFT RIGHT LEFT    43 cm 40 cm            AROM/PROM         Joint: Eval Date:  Re-Assess Date:  Re-Assess Date:    Active ROM RIGHT LEFT RIGHT LEFT RIGHT LEFT   hip flexion          Hip ER/IR         Knee flexion  108 122       Knee extension  -2 0       Ankle Dorsiflexion                             Strength:     Eval Date:  Re-Assess Date:  Re-Assess Date:      RIGHT LEFT RIGHT LEFT RIGHT LEFT    Hip flexion 4+/5 4+/5       Hip abd 4+/5 4+/5       Knee Flexion 4/5 4+/5       Knee Extension 4/5 4+/5       Ankle DF 4/5 4/5       Ankle PF 4+/5 4+/5                                             Special Tests:   None     Neurological Screen:  Intact      Functional Mobility:    Transfers: B UE moderate use  Gait: mildly decreased stance phase of right LE--uses sc community ambulation   Stairs: reciprocal pattern with moderate use of hand rails      Balance:    Decreased though right LE    Body Structures Involved:  1. Bones  2. Joints  3. Muscles  4. Ligaments Body Functions Affected:  1. Sensory/Pain  2. Neuromusculoskeletal  3. Movement Related Activities and Participation Affected:  1. Mobility  2. Self Care   Number of elements that affect the Plan of Care: 4+: HIGH COMPLEXITY   CLINICAL PRESENTATION:   Presentation: Stable and uncomplicated: LOW COMPLEXITY   CLINICAL DECISION MAKING:    Tool Used: Lower Extremity Functional Scale (LEFS)  Score:  Initial: 40/80 Most Recent: /80 (Date: -- )   Interpretation of Score: 20 questions each scored on a 5 point scale with 0 representing \"extreme difficulty or unable to perform\" and 4 representing \"no difficulty\". The lower the score, the greater the functional disability. 80/80 represents no disability. Minimal detectable change is 9 points. Score 80 79-63 62-48 47-32 31-16 15-1 0   Modifier CH CI CJ CK CL CM CN     ? Mobility - Walking and Moving Around:     - CURRENT STATUS: CK - 40%-59% impaired, limited or restricted    - GOAL STATUS: CJ - 20%-39% impaired, limited or restricted    - D/C STATUS:  ---------------To be determined---------------    Medical Necessity:   · Patient is expected to demonstrate progress in strength, range of motion, balance and coordination to increase independence with ADLs and community/social activities. Reason for Services/Other Comments:  · Patient continues to require modification of therapeutic interventions to increase complexity of exercises. Use of outcome tool(s) and clinical judgement create a POC that gives a: Clear prediction of patient's progress: LOW COMPLEXITY   TREATMENT:   (In addition to Assessment/Re-Assessment sessions the following treatments were rendered)    Pre-treatment Symptoms:  See above   · Pain: Initial:    5/10 Post Session:  5/10     THERAPEUTIC EXERCISE: (15 minutes):  Exercises per grid below to improve mobility, strength and balance.   Required minimal visual and verbal cues to promote proper body alignment, promote proper body posture and promote proper body mechanics. Progressed resistance, range, repetitions and complexity of movement as indicated. Date:  10/8/18 Date:   Date:     Activity/Exercise Parameters Parameters Parameters   Education  POC     SLR X 5, 3 sec      QS X 5      Heel slide  X 5 (with 3 intervals of push) x 10 sec                           [employer] [facility]    MANUAL THERAPY: (10 minutes): Joint mobilization, Soft tissue mobilization and Manipulation was utilized and necessary because of the patient's restricted joint motion and painful spasm. · Scar mobilizations to decrease adhesions using superficial skin glide and roll technique. MODALITIES: ( minutes):       Treatment/Session Assessment:  Verbalized understanding of HEP and role of PT/POC. Cuing needed to progress technique of above exercises to increase challenge. Next visit, may benefit from lymphedema technique and modalities to reduce edema. ·      ·   Compliance with Program/Exercises: Will assess as treatment progresses. · Recommendations/Intent for next treatment session: \"Next visit will focus on advancements to more challenging activities and reduction in assistance provided\".     Future Appointments  Date Time Provider Sonia Martell   10[facility]11/2018 8:45 AM [facility]   10/15/2018 1:45 PM Hamida Bring, PT [facility]   10/18/2018 8:45 AM Hamida Bring, PT [facility]   10/23/2018 8:45 AM Hamida Bring, PT [facility]   10/25/2018 8:45 AM Hamida Bring, PT [facility]   10/29/2018 1:00 PM Hamida Bring, PT [facility]   11/1/2018 8:45 AM Hamida Bring, PT [facility]   11/5/2018 8:45 AM Hamida Bring, PT [facility]   11/8/2018 8:45 AM Hamida Bring, PT [facility]   11/12/2018 8:45 AM Hamida Bring, PT [facility]   11/15/2018 8:45 AM Hamida Bring, PT [facility]       Total Treatment Duration:25 min (+ 35 min evaluation)  PT Patient Time In/Time Out  Time In: 0100  Time Out: [address] [facility], PT

## 2018-10-08 NOTE — PROGRESS NOTES
Ambulatory/Rehab Services H2 Model Falls Risk Assessment    Risk Factor Pts. ·   Confusion/Disorientation/Impulsivity  []    4 ·   Symptomatic Depression  []   2 ·   Altered Elimination  []   1 ·   Dizziness/Vertigo  []   1 ·   Gender (Male)  []   1 ·   Any administered antiepileptics (anticonvulsants):  []   2 ·   Any administered benzodiazepines:  []   1 ·   Visual Impairment (specify):  []   1 ·   Portable Oxygen Use  []   1 ·   Orthostatic ? BP  []   1 ·   History of Recent Falls (within 3 mos.)  []   5     Ability to Rise from Chair (choose one) Pts. ·   Ability to rise in a single movement  []   0 ·   Pushes up, successful in one attempt  [x]   1 ·   Multiple attempts, but successful  []   3 ·   Unable to rise without assistance  []   4   Total: (5 or greater = High Risk) 1     Falls Prevention Plan:   []                Physical Limitations to Exercise (specify):   []                Mobility Assistance Device (type):   []                Exercise/Equipment Adaptation (specify):    ©2010 [facility] of [address] Patent #[id number].  Federal Law prohibits the replication, distribution or use without written permission from [facility] [employer]

## 2018-10-11 ENCOUNTER — HOSPITAL ENCOUNTER (OUTPATIENT)
Dept: PHYSICAL THERAPY | Age: 78
End: 2018-10-11
Payer: MEDICARE

## 2018-10-15 ENCOUNTER — HOSPITAL ENCOUNTER (OUTPATIENT)
Dept: PHYSICAL THERAPY | Age: 78
Discharge: HOME | End: 2018-10-15
Payer: MEDICARE

## 2018-10-15 PROCEDURE — 97140 MANUAL THERAPY 1/> REGIONS: CPT

## 2018-10-15 PROCEDURE — 97016 VASOPNEUMATIC DEVICE THERAPY: CPT

## 2018-10-15 PROCEDURE — 97110 THERAPEUTIC EXERCISES: CPT

## 2018-10-15 NOTE — PROGRESS NOTES
Spencer Faith  : 1940      [place] / [place] / Product Type: Medicare /  Anentte Shaw at [address] [place] [address]. [facility], [address], [facility], [address] [place] [address]  [phone]   [phone]        OUTPATIENT PHYSICAL THERAPY:Daily Note 10/15/2018      ICD-10: Treatment Diagnosis: Pain in Right Knee (M25.561)  Stiffness of Right Knee, Not elsewhere classified (M25.661)  Difficulty in walking, Not elsewhere classified (R26.2)  effusion right knee joint (M25.461)    Precautions/Allergies:   Sulfa (sulfonamide antibiotics) and Tape [adhesive]   Fall Risk Score: 1 (? 5 = High Risk)  MD Orders: Eval and Treat  MEDICAL/REFERRING DIAGNOSIS:  u   DATE OF ONSET: 18  REFERRING PHYSICIAN: Nadia Joy MD  RETURN PHYSICIAN APPOINTMENT: TBD by patient     INITIAL ASSESSMENT:  Ms. Spencer Faith demonstrates functional limitations consistent with 6 weeks s/p R TKA. PT evaluation reveals decreased strength, decreased ROM, gait abnormality, pain and edema of right knee joint. Pt will benefit from skilled PT including  manual therapeutic techniques to decrease edema and improve joint mobility,  therapeutic exercises and activities, balance and comprehensive home exercises program to address current impairment. PROBLEM LIST (Impacting functional limitations):  1. Decreased Strength  2. Decreased ADL/Functional Activities  3. Decreased Transfer Abilities  4. Decreased Ambulation Ability/Technique  5. Decreased Balance  6. Increased Pain  7. Decreased Activity Tolerance  8. Decreased Grant with Home Exercise Program INTERVENTIONS PLANNED:  1. Balance Exercise  2. Bed Mobility  3. Cold  4. Cryotherapy  5. Family Education  6. Gait Training  7. Heat  8. Home Exercise Program (HEP)  9. Manual Therapy  10. Neuromuscular Re-education/Strengthening  11. Range of Motion (ROM)  12. Therapeutic Activites  13.  Therapeutic Exercise/Strengthening  14. Transfer Training  15. Ultrasound (US)     TREATMENT PLAN:  Effective Dates: 10/8/2018 TO 1/6/2019 (90 days). Frequency/Duration: 2 times a week for 90 Days    GOALS: (Goals have been discussed and agreed upon with patient.)  SHORT-TERM FUNCTIONAL GOALS: Time Frame: 2-4 weeks   1. Pt will be independent with HEP. 2. Pt will report R knee pain in no longer constant. 3. Pt will demonstrate mid patellar girth measurements within 1 cm bilaterally. 4. Pt will ambulate safely on all surfaces without use of cane reporting no tripping, falling, LOB at least 2 weeks. DISCHARGE GOALS: Time Frame: 6-8 weeks   1. Pt will improve LEFS score by at least 10 points. 2. Pt will sit to stand TF 5/5 times without use of UE.   3. Pt will ambulate stairs with recip gait pattern, ascending and descending no hand rail safely    4. Pt will demonstrate improved strength of right LE to at least 4+/5 without pain during testing. REHABILITATION POTENTIAL FOR STATED GOALS: Good    Regarding Veronika Gary's therapy, I certify that the treatment plan above will be carried out by a therapist or under their direction. Thank you for this referral,  Al Whittington, PT     Referring Physician Signature: Louis Bangura MD              Date                    HISTORY:   History of Present Injury/Illness (Reason for Referral):  Pt states right knee pain since March 2018, conservative treatments failed leading to R TKA on 8/23/18. In hospital 2 nights, [facility] for 3 weeks then HHPT 2 weeks. Continues to perform HEP independently currently. Pt reports doing well overall at this point, gait steadily improving but using sc for community distances, no assist in home. Driving independently. Chief c/o's pain and swelling.          Current symptoms:  Constant anterior knee pain and scar sensitive, weakness B LE, edema     · Aggravating factors: disrupting sleep, stairs, knee extension, sitting prolonged · Relieving factors:  Tylenol, ice, tramadol    Pain level:  5/10 presently, 5/10 worst, 3/10 best     Past Medical History/Comorbidities:   Ms. Francesca Townsend  has a past medical history of Atrial septal aneurysm; CAD (coronary artery disease); Dyslipidemia; Heart murmur; History of breast cancer (); Hypertension; Osteoarthritis; Paroxysmal atrial fibrillation ([address] Patent foramen ovale; and Valvular heart disease. She also has no past medical history of Adverse effect of anesthesia; Asthma; Autoimmune disease ([address] Chronic kidney disease; Chronic obstructive pulmonary disease ([address] Chronic pain; Coagulation disorder ([address] Diabetes ([address] Difficult intubation; GERD (gastroesophageal reflux disease); Heart failure ([address] Ill-defined condition; Liver disease; Malignant hyperthermia due to anesthesia; Morbid obesity ([address] Nausea & vomiting; Pseudocholinesterase deficiency; Psychiatric disorder; PUD (peptic ulcer disease); Seizures ([address] Stroke [facility]); Thromboembolus ([address] Thyroid disease; Unspecified adverse effect of anesthesia; or Unspecified sleep apnea. Ms. Francesca Townsend  has a past surgical history that includes hx mastectomy (Left, ); hx hysterectomy (); hx  section; hx cholecystectomy (); hx cataract removal (Bilateral); hx back surgery; hx rotator cuff repair (Left, ); and hx other surgical ().     Active Ambulatory Problems     Diagnosis Date Noted    HTN (hypertension) 2014    Personal history of breast cancer 2014    Hyperlipidemia 2014    Atrial fibrillation ([address] 2014    Acute cholecystitis 2014    Osteoarthritis 2015    S/P total knee arthroplasty 2015    Perineal abscess 2016    Infection of perineal wound 2016    S/P total knee arthroplasty, right 2018     Resolved Ambulatory Problems     Diagnosis Date Noted    No Resolved Ambulatory Problems     Past Medical History:   Diagnosis Date    Atrial septal aneurysm     CAD (coronary artery disease)     Dyslipidemia     Heart murmur     History of breast cancer 1990    Hypertension     Osteoarthritis     Paroxysmal atrial fibrillation (HCC)     Patent foramen ovale     Valvular heart disease      Social History/Living Environment:        Social History     Social History    Marital status:      Spouse name: N/A    Number of children: N/A    Years of education: N/A     Occupational History    Not on file. Social History Main Topics    Smoking status: Never Smoker    Smokeless tobacco: Never Used    Alcohol use No    Drug use: No    Sexual activity: Not on file     Other Topics Concern    Not on file     Social History Narrative     Prior Level of Function/Work/Activity:  Retired   Dominant Side:         LEFT  Current Medications:    Current Outpatient Prescriptions:     hydroCHLOROthiazide (HYDRODIURIL) 25 mg tablet, Take 25 mg by mouth daily. , Disp: , Rfl:     amiodarone (CORDARONE) 200 mg tablet, Take 200 mg by mouth daily. Indications: VENTRICULAR RATE CONTROL IN ATRIAL FIBRILLATION, Disp: , Rfl:     traMADol (ULTRAM) 50 mg tablet, Take 50 mg by mouth every six (6) hours as needed for Pain. pt may take 1-2 tabs as needed for pain, Disp: , Rfl:     methocarbamol (ROBAXIN) 750 mg tablet, Take 750 mg by mouth four (4) times daily as needed (muscle relaxer). as needed for muscle spasms, Disp: , Rfl:     apixaban (ELIQUIS) 5 mg tablet, Take 5 mg by mouth every twelve (12) hours. , Disp: , Rfl:     celecoxib (CELEBREX) 200 mg capsule, Take 200 mg by mouth two (2) times a day., Disp: , Rfl:     warfarin (COUMADIN) 2 mg tablet, Take 2 mg by mouth daily. , Disp: , Rfl:     senna (SENOKOT) 8.6 mg tablet, Take 2 Tabs by mouth daily. , Disp: , Rfl:     oxyCODONE (OXYIR) 5 mg capsule, Take 5 mg by mouth every four (4) hours as needed for Pain., Disp: , Rfl:     magnesium 250 mg tab, Take 1 Tab by mouth nightly., Disp: , Rfl:     multivitamin (ONE A DAY) tablet, Take 1 Tab by mouth daily. , Disp: , Rfl:     acetaminophen (TYLENOL EXTRA STRENGTH) 500 mg tablet, Take 500 mg by mouth every six (6) hours as needed for Pain., Disp: , Rfl:     calcium-cholecalciferol, d3, 600-125 mg-unit tab, Take 1 Tab by mouth daily. , Disp: , Rfl:     fenofibrate (TRICOR) 160 mg tablet, Take 160 mg by mouth nightly., Disp: , Rfl:     rosuvastatin (CRESTOR) 10 mg tablet, Take 10 mg by mouth nightly., Disp: , Rfl:     benazepril (LOTENSIN) 20 mg tablet, Take 20 mg by mouth daily.  Indications: HYPERTENSION, Disp: , Rfl:      Date Last Reviewed:  10/15/2018   EXAMINATION:   Observation/Orthostatic Postural Assessment:    Decreased weight bearing through right LE    Palpation:    Moderate edema noted of right knee     Girth Measurement:   Joint: Eval Date: 10/8/18  Re-Assess Date:  Re-Assess Date:    Mid patellar  Measurement RIGHT LEFT RIGHT LEFT RIGHT LEFT    43 cm 40 cm            AROM/PROM         Joint: Eval Date:  Re-Assess Date:  Re-Assess Date:    Active ROM RIGHT LEFT RIGHT LEFT RIGHT LEFT   hip flexion          Hip ER/IR         Knee flexion  108 122       Knee extension  -2 0       Ankle Dorsiflexion                             Strength:     Eval Date:  Re-Assess Date:  Re-Assess Date:      RIGHT LEFT RIGHT LEFT RIGHT LEFT    Hip flexion 4+/5 4+/5       Hip abd 4+/5 4+/5       Knee Flexion 4/5 4+/5       Knee Extension 4/5 4+/5       Ankle DF 4/5 4/5       Ankle PF 4+/5 4+/5                                             Special Tests:   None     Neurological Screen:  Intact      Functional Mobility:    Transfers: B UE moderate use  Gait: mildly decreased stance phase of right LE--uses sc community ambulation   Stairs: reciprocal pattern with moderate use of hand rails      Balance:    Decreased though right LE    CLINICAL DECISION MAKING:    Tool Used: Lower Extremity Functional Scale (LEFS)  Score:  Initial: 40/80 Most Recent: /80 (Date: -- )   Interpretation of Score: 20 questions each scored on a 5 point scale with 0 representing \"extreme difficulty or unable to perform\" and 4 representing \"no difficulty\". The lower the score, the greater the functional disability. 80/80 represents no disability. Minimal detectable change is 9 points. Score 80 79-63 62-48 47-32 31-16 15-1 0   Modifier CH CI CJ CK CL CM CN     ? Mobility - Walking and Moving Around:     - CURRENT STATUS: CK - 40%-59% impaired, limited or restricted    - GOAL STATUS: CJ - 20%-39% impaired, limited or restricted    - D/C STATUS:  ---------------To be determined---------------    Medical Necessity:   · Patient is expected to demonstrate progress in strength, range of motion, balance and coordination to increase independence with ADLs and community/social activities. Reason for Services/Other Comments:  · Patient continues to require modification of therapeutic interventions to increase complexity of exercises. TREATMENT:   (In addition to Assessment/Re-Assessment sessions the following treatments were rendered)    Pre-treatment Symptoms:  Pt states that she is doing ok but very sore after this last weekend when she was standing for long periods of time. She does feel her swelling is much less overall since last session. She did one of the exercises I showed her last visit but could not remember the other. Pt reports sleeping better over last few nights due to use of Melatonin   · Pain: Initial:    4-5/10 Post Session:  0/10     THERAPEUTIC EXERCISE: (30 minutes):  Exercises per grid below to improve mobility, strength and balance. Required minimal visual and verbal cues to promote proper body alignment, promote proper body posture and promote proper body mechanics. Progressed resistance, range, repetitions and complexity of movement as indicated.      Date:  10/8/18 Date:  10/15/18 Date:     Activity/Exercise Parameters Parameters Parameters   Education  POC     SLR X 5, 3 sec  X 10, 5 sec     QS X 5  With heel slide     Heel slide  X 5 (with 3 intervals of push) x 10 sec  X 5 R (with 3 intervals of push) x 10 sec     nustep   8 min level     clamshell  X 10 R    SAQ  X 10 R                         [employer] Portal    MANUAL THERAPY: (15 minutes): Joint mobilization, Soft tissue mobilization and Manipulation was utilized and necessary because of the patient's restricted joint motion and painful spasm. · Scar mobilizations to decrease adhesions using superficial skin glide and roll technique. · Lymph drainage technique R knee     MODALITIES: ( 10 minutes):   · vasopneumatic compression using Game ready moderate compression, 34 degrees    Treatment/Session Assessment:   Pt with good performance of exercises today in clinic. Attempted lymph drainage technique and vaso pneumatic compression to reduce inflammation . Girth measurement did no change however tissue more mobile, increased knee flexion and decreased pain at end of session. ·      ·   Compliance with Program/Exercises: Will assess as treatment progresses. · Recommendations/Intent for next treatment session: \"Next visit will focus on advancements to more challenging activities and reduction in assistance provided\".     Future Appointments  Date Time Provider Sonia Martell   10/18/2018 8:45 AM Theone Maxine, PT [facility]   10/23/2018 8:45 AM Theone Maxine, PT [facility]   10/25/2018 8:45 AM Theone Maxine, PT [facility]   10/29/2018 1:00 PM Theone Maxine, PT [facility]   11/1/2018 8:45 AM Theone Maxine, PT [facility]   11/5/2018 8:45 AM Theone Maxine, PT [facility]   11/8/2018 8:45 AM Theone Maxine, PT [facility]   11/12/2018 8:45 AM Theone Maxine, PT [facility]   11/15/2018 8:45 AM Theone Maxine, PT [facility]       Total Treatment Duration:  PT Patient Time In/Time Out  Time In: 1345  Time Out: [address], PT

## 2018-10-18 ENCOUNTER — HOSPITAL ENCOUNTER (OUTPATIENT)
Dept: PHYSICAL THERAPY | Age: 78
Discharge: HOME | End: 2018-10-18
Payer: MEDICARE

## 2018-10-18 PROCEDURE — 97140 MANUAL THERAPY 1/> REGIONS: CPT

## 2018-10-18 PROCEDURE — 97110 THERAPEUTIC EXERCISES: CPT

## 2018-10-18 PROCEDURE — 97016 VASOPNEUMATIC DEVICE THERAPY: CPT

## 2018-10-18 NOTE — PROGRESS NOTES
Karen Soto  : 1940      [place] / [place] PART [place] B / Product Type: Medicare /  [employer] [facility] [address] at [address] [facility]. [facility], [address], [facility], [address] [place] [address]  [phone]   [phone]        OUTPATIENT PHYSICAL THERAPY:Daily Note 10/18/2018      ICD-10: Treatment Diagnosis: Pain in Right Knee (M25.561)  Stiffness of Right Knee, Not elsewhere classified (M25.661)  Difficulty in walking, Not elsewhere classified (R26.2)  effusion right knee joint (M25.461)    Precautions/Allergies:   Sulfa (sulfonamide antibiotics) and Tape [adhesive]   Fall Risk Score: 1 (? 5 = High Risk)  MD Orders: Eval and Treat  MEDICAL/REFERRING DIAGNOSIS:  Presence of right artificial knee joint   DATE OF ONSET: 18  REFERRING PHYSICIAN: Vanna Magdaleno MD  RETURN PHYSICIAN APPOINTMENT: TBD by patient     INITIAL ASSESSMENT:  Ms. Karen Soto demonstrates functional limitations consistent with 6 weeks s/p R TKA. PT evaluation reveals decreased strength, decreased ROM, gait abnormality, pain and edema of right knee joint. Pt will benefit from skilled PT including  manual therapeutic techniques to decrease edema and improve joint mobility,  therapeutic exercises and activities, balance and comprehensive home exercises program to address current impairment. PROBLEM LIST (Impacting functional limitations):  1. Decreased Strength  2. Decreased ADL/Functional Activities  3. Decreased Transfer Abilities  4. Decreased Ambulation Ability/Technique  5. Decreased Balance  6. Increased Pain  7. Decreased Activity Tolerance  8. Decreased [place] with Home Exercise Program INTERVENTIONS PLANNED:  1. Balance Exercise  2. Bed Mobility  3. Cold  4. Cryotherapy  5. Family Education  6. Gait Training  7. Heat  8. Home Exercise Program (HEP)  9. Manual Therapy  10. Neuromuscular Re-education/Strengthening  11. Range of Motion (ROM)  12.  Therapeutic Activites  13. Therapeutic Exercise/Strengthening  14. Transfer Training  15. Ultrasound (US)     TREATMENT PLAN:  Effective Dates: 10/8/2018 TO 1/6/2019 (90 days). Frequency/Duration: 2 times a week for 90 Days    GOALS: (Goals have been discussed and agreed upon with patient.)  SHORT-TERM FUNCTIONAL GOALS: Time Frame: 2-4 weeks   1. Pt will be independent with HEP. 2. Pt will report R knee pain in no longer constant. 3. Pt will demonstrate mid patellar girth measurements within 1 cm bilaterally. 4. Pt will ambulate safely on all surfaces without use of cane reporting no tripping, falling, LOB at least 2 weeks. DISCHARGE GOALS: Time Frame: 6-8 weeks   1. Pt will improve LEFS score by at least 10 points. 2. Pt will sit to stand TF 5/5 times without use of UE.   3. Pt will ambulate stairs with recip gait pattern, ascending and descending no hand rail safely    4. Pt will demonstrate improved strength of right LE to at least 4+/5 without pain during testing. REHABILITATION POTENTIAL FOR STATED GOALS: Good                HISTORY:   History of Present Injury/Illness (Reason for Referral):  Pt states right knee pain since March 2018, conservative treatments failed leading to R TKA on 8/23/18. In hospital 2 nights, [facility] for 3 weeks then HHPT 2 weeks. Continues to perform HEP independently currently. Pt reports doing well overall at this point, gait steadily improving but using sc for community distances, no assist in home. Driving independently. Chief c/o's pain and swelling.          Current symptoms:  Constant anterior knee pain and scar sensitive, weakness B LE, edema     · Aggravating factors: disrupting sleep, stairs, knee extension, sitting prolonged     · Relieving factors:  Tylenol, ice, tramadol    Pain level:  5/10 presently, 5/10 worst, 3/10 best     Past Medical History/Comorbidities:   Ms. Francesca Townsend  has a past medical history of Atrial septal aneurysm, CAD (coronary artery disease), Dyslipidemia, Heart murmur, History of breast cancer, Hypertension, Osteoarthritis, Paroxysmal atrial fibrillation (HCC), Patent foramen ovale, and Valvular heart disease. Ms. Analia Ontiveros  has a past surgical history that includes hx mastectomy (Left, ); hx hysterectomy (); hx  section; hx cholecystectomy (); hx cataract removal (Bilateral); hx back surgery; hx rotator cuff repair (Left, ); hx other surgical (); RIGHT KNEE ARTHROPLASTY TOTAL/DEPUY (Right, 2018); LEFT KNEE ARTHROPLASTY TOTAL (Left, 2015); and CHOLECYSTECTOMY LAPAROSCOPIC POSS OPEN (N/A, 3/13/2014).     Active Ambulatory Problems     Diagnosis Date Noted    HTN (hypertension) 2014    Personal history of breast cancer 2014    Hyperlipidemia 2014    Atrial fibrillation ([facility] [address] 2014    Acute cholecystitis 2014    Osteoarthritis 2015    S/P total knee arthroplasty 2015    Perineal abscess 2016    Infection of perineal wound 2016    S/P total knee arthroplasty, right 2018     Resolved Ambulatory Problems     Diagnosis Date Noted    No Resolved Ambulatory Problems     Past Medical History:   Diagnosis Date    Atrial septal aneurysm     CAD (coronary artery disease)     Dyslipidemia     Heart murmur     History of breast cancer     Hypertension     Osteoarthritis     Paroxysmal atrial fibrillation (HCC)     Patent foramen ovale     Valvular heart disease      Social History/Living Environment:        Social History     Socioeconomic History    Marital status:      Spouse name: Not on file    Number of children: Not on file    Years of education: Not on file    Highest education level: Not on file   Social Needs    Financial resource strain: Not on file    Food insecurity - worry: Not on file    Food insecurity - inability: Not on file   [employer] needs - medical: Not on file   [employer] needs - non-medical: Not on file Occupational History    Not on file   Tobacco Use    Smoking status: Never Smoker    Smokeless tobacco: Never Used   Substance and Sexual Activity    Alcohol use: No    Drug use: No    Sexual activity: Not on file   Other Topics Concern    Not on file   Social History Narrative    Not on file     Prior Level of Function/Work/Activity:  Retired   Dominant Side:         LEFT  Current Medications:    Current Outpatient Medications:     hydroCHLOROthiazide (HYDRODIURIL) 25 mg tablet, Take 25 mg by mouth daily. , Disp: , Rfl:     amiodarone (CORDARONE) 200 mg tablet, Take 200 mg by mouth daily. Indications: VENTRICULAR RATE CONTROL IN ATRIAL FIBRILLATION, Disp: , Rfl:     traMADol (ULTRAM) 50 mg tablet, Take 50 mg by mouth every six (6) hours as needed for Pain. pt may take 1-2 tabs as needed for pain, Disp: , Rfl:     methocarbamol (ROBAXIN) 750 mg tablet, Take 750 mg by mouth four (4) times daily as needed (muscle relaxer). as needed for muscle spasms, Disp: , Rfl:     apixaban (ELIQUIS) 5 mg tablet, Take 5 mg by mouth every twelve (12) hours. , Disp: , Rfl:     celecoxib (CELEBREX) 200 mg capsule, Take 200 mg by mouth two (2) times a day., Disp: , Rfl:     warfarin (COUMADIN) 2 mg tablet, Take 2 mg by mouth daily. , Disp: , Rfl:     senna (SENOKOT) 8.6 mg tablet, Take 2 Tabs by mouth daily. , Disp: , Rfl:     oxyCODONE (OXYIR) 5 mg capsule, Take 5 mg by mouth every four (4) hours as needed for Pain., Disp: , Rfl:     magnesium 250 mg tab, Take 1 Tab by mouth nightly., Disp: , Rfl:     multivitamin (ONE A DAY) tablet, Take 1 Tab by mouth daily. , Disp: , Rfl:     acetaminophen (TYLENOL EXTRA STRENGTH) 500 mg tablet, Take 500 mg by mouth every six (6) hours as needed for Pain., Disp: , Rfl:     calcium-cholecalciferol, d3, 600-125 mg-unit tab, Take 1 Tab by mouth daily. , Disp: , Rfl:     fenofibrate (TRICOR) 160 mg tablet, Take 160 mg by mouth nightly., Disp: , Rfl:     rosuvastatin (CRESTOR) 10 mg tablet, Take 10 mg by mouth nightly., Disp: , Rfl:     benazepril (LOTENSIN) 20 mg tablet, Take 20 mg by mouth daily. Indications: HYPERTENSION, Disp: , Rfl:      Date Last Reviewed:  10/18/2018   EXAMINATION:   Observation/Orthostatic Postural Assessment:    Decreased weight bearing through right LE    Palpation:    Moderate edema noted of right knee     Girth Measurement:   Joint: Eval Date: 10/8/18  Re-Assess Date:  Re-Assess Date:    Mid patellar  Measurement RIGHT LEFT RIGHT LEFT RIGHT LEFT    43 cm 40 cm            AROM/PROM         Joint: Eval Date:  Re-Assess Date:  Re-Assess Date:    Active ROM RIGHT LEFT RIGHT LEFT RIGHT LEFT   hip flexion          Hip ER/IR         Knee flexion  108 122       Knee extension  -2 0       Ankle Dorsiflexion                             Strength:     Eval Date:  Re-Assess Date:  Re-Assess Date:      RIGHT LEFT RIGHT LEFT RIGHT LEFT    Hip flexion 4+/5 4+/5       Hip abd 4+/5 4+/5       Knee Flexion 4/5 4+/5       Knee Extension 4/5 4+/5       Ankle DF 4/5 4/5       Ankle PF 4+/5 4+/5                                             Special Tests:   None     Neurological Screen:  Intact      Functional Mobility:    Transfers: B UE moderate use  Gait: mildly decreased stance phase of right LE--uses sc community ambulation   Stairs: reciprocal pattern with moderate use of hand rails      Balance:    Decreased though right LE    CLINICAL DECISION MAKING:    Tool Used: Lower Extremity Functional Scale (LEFS)  Score:  Initial: 40/80 Most Recent: /80 (Date: -- )   Interpretation of Score: 20 questions each scored on a 5 point scale with 0 representing \"extreme difficulty or unable to perform\" and 4 representing \"no difficulty\". The lower the score, the greater the functional disability. 80/80 represents no disability. Minimal detectable change is 9 points. Score 80 79-63 62-48 47-32 31-16 15-1 0   Modifier CH CI CJ CK CL CM CN     ?  Mobility - Walking and Moving Around:     - CURRENT STATUS: CK - 40%-59% impaired, limited or restricted    - GOAL STATUS: CJ - 20%-39% impaired, limited or restricted    - D/C STATUS:  ---------------To be determined---------------    Medical Necessity:   · Patient is expected to demonstrate progress in strength, range of motion, balance and coordination to increase independence with ADLs and community/social activities. Reason for Services/Other Comments:  · Patient continues to require modification of therapeutic interventions to increase complexity of exercises. TREATMENT:   (In addition to Assessment/Re-Assessment sessions the following treatments were rendered)    Pre-treatment Symptoms:  Pt reports continued difficulty with sleeping. Arrives without cane, stating that she has decided she no longer needs it. · Pain: Initial:    5-6/10 Post Session:  0/10     THERAPEUTIC EXERCISE: (25 minutes):  Exercises per grid below to improve mobility, strength and balance. Required minimal visual and verbal cues to promote proper body alignment, promote proper body posture and promote proper body mechanics. Progressed resistance, range, repetitions and complexity of movement as indicated. Date:  10/8/18 Date:  10/15/18 Date:  10/18/18   Activity/Exercise Parameters Parameters Parameters   Education  POC     SLR X 5, 3 sec  X 10, 5 sec     QS X 5  With heel slide     Heel slide  X 5 (with 3 intervals of push) x 10 sec  X 5 R (with 3 intervals of push) x 10 sec     nustep   8 min level  10 min level    clamshell  X 10 R    SAQ  X 10 R    Side stepping    2 x 50 ft    Step taps    X 20 B    Gait    100ft      [facility] Portal    MANUAL THERAPY: (15 minutes): Joint mobilization, Soft tissue mobilization and Manipulation was utilized and necessary because of the patient's restricted joint motion and painful spasm. · Scar mobilizations to decrease adhesions using superficial skin glide and roll technique.   · Lymph drainage technique R knee MODALITIES: ( 10 minutes):   · vasopneumatic compression using Game ready moderate compression, 34 degrees    Treatment/Session Assessment: scar adhesions restricting patellar movement with improved mobility after manual techniques today. Pt needing significant cuing to decrease ER of right hip during exercises and gait. Decreased swelling after vaso pneumatic compression. ·      ·   Compliance with Program/Exercises: Will assess as treatment progresses. · Recommendations/Intent for next treatment session: \"Next visit will focus on advancements to more challenging activities and reduction in assistance provided\".     Future Appointments   Date Time Provider Sonia Martell   10/23/2018  8:45 AM Ed Beckfrod, PT [facility]   10/25/2018  8:45 AM [facility]   10/29/2018  1:00 PM [facility]   11/1/2018  8:45 AM [facility]   11/5/2018  8:45 AM [facility]   11/8/2018  8:45 AM [facility]   11/12/2018  8:45 AM Ed Beckford, [facility]   11/15/2018  8:45 AM Carren Landau, Sigurd Spice, PT [facility]       Total Treatment Duration:  PT Patient Time In/Time Out  Time In: 0845  Time Out: Anjali Vasquez, PT

## 2018-10-23 ENCOUNTER — HOSPITAL ENCOUNTER (OUTPATIENT)
Dept: PHYSICAL THERAPY | Age: 78
Discharge: HOME | End: 2018-10-23
Payer: MEDICARE

## 2018-10-23 PROCEDURE — 97140 MANUAL THERAPY 1/> REGIONS: CPT

## 2018-10-23 PROCEDURE — 97112 NEUROMUSCULAR REEDUCATION: CPT

## 2018-10-23 PROCEDURE — 97016 VASOPNEUMATIC DEVICE THERAPY: CPT

## 2018-10-23 PROCEDURE — 97110 THERAPEUTIC EXERCISES: CPT

## 2018-10-23 NOTE — PROGRESS NOTES
Geri Starr  : 1940      [place] / [place] / Product Type: Medicare /  Radha Enio at [address] [place] [address]. [facility], [address], [facility], [address] [place] [address]  [phone]   [phone]        OUTPATIENT PHYSICAL THERAPY:Daily Note 10/23/2018      ICD-10: Treatment Diagnosis: Pain in Right Knee (M25.561)  Stiffness of Right Knee, Not elsewhere classified (M25.661)  Difficulty in walking, Not elsewhere classified (R26.2)  effusion right knee joint (M25.461)    Precautions/Allergies:   Sulfa (sulfonamide antibiotics) and Tape [adhesive]   Fall Risk Score: 1 (? 5 = High Risk)  MD Orders: Eval and Treat  MEDICAL/REFERRING DIAGNOSIS:  Presence of right artificial knee joint   DATE OF ONSET: 18  REFERRING PHYSICIAN: Argelia Nogueira MD  RETURN PHYSICIAN APPOINTMENT: TBD by patient     INITIAL ASSESSMENT:  Ms. Geri Starr demonstrates functional limitations consistent with 6 weeks s/p R TKA. PT evaluation reveals decreased strength, decreased ROM, gait abnormality, pain and edema of right knee joint. Pt will benefit from skilled PT including  manual therapeutic techniques to decrease edema and improve joint mobility,  therapeutic exercises and activities, balance and comprehensive home exercises program to address current impairment. PROBLEM LIST (Impacting functional limitations):  1. Decreased Strength  2. Decreased ADL/Functional Activities  3. Decreased Transfer Abilities  4. Decreased Ambulation Ability/Technique  5. Decreased Balance  6. Increased Pain  7. Decreased Activity Tolerance  8. Decreased Bernie with Home Exercise Program INTERVENTIONS PLANNED:  1. Balance Exercise  2. Bed Mobility  3. Cold  4. Cryotherapy  5. Family Education  6. Gait Training  7. Heat  8. Home Exercise Program (HEP)  9. Manual Therapy  10. Neuromuscular Re-education/Strengthening  11. Range of Motion (ROM)  12.  Therapeutic Activites  13. Therapeutic Exercise/Strengthening  14. Transfer Training  15. Ultrasound (US)     TREATMENT PLAN:  Effective Dates: 10/8/2018 TO 1/6/2019 (90 days). Frequency/Duration: 2 times a week for 90 Days    GOALS: (Goals have been discussed and agreed upon with patient.)  SHORT-TERM FUNCTIONAL GOALS: Time Frame: 2-4 weeks   1. Pt will be independent with HEP. 2. Pt will report R knee pain in no longer constant. 3. Pt will demonstrate mid patellar girth measurements within 1 cm bilaterally. 4. Pt will ambulate safely on all surfaces without use of cane reporting no tripping, falling, LOB at least 2 weeks. DISCHARGE GOALS: Time Frame: 6-8 weeks   1. Pt will improve LEFS score by at least 10 points. 2. Pt will sit to stand TF 5/5 times without use of UE.   3. Pt will ambulate stairs with recip gait pattern, ascending and descending no hand rail safely    4. Pt will demonstrate improved strength of right LE to at least 4+/5 without pain during testing. REHABILITATION POTENTIAL FOR STATED GOALS: Good                HISTORY:   History of Present Injury/Illness (Reason for Referral):  Pt states right knee pain since March 2018, conservative treatments failed leading to R TKA on 8/23/18. In hospital 2 nights, [facility] for 3 weeks then HHPT 2 weeks. Continues to perform HEP independently currently. Pt reports doing well overall at this point, gait steadily improving but using sc for community distances, no assist in home. Driving independently. Chief c/o's pain and swelling.          Current symptoms:  Constant anterior knee pain and scar sensitive, weakness B LE, edema     · Aggravating factors: disrupting sleep, stairs, knee extension, sitting prolonged     · Relieving factors:  Tylenol, ice, tramadol    Pain level:  5/10 presently, 5/10 worst, 3/10 best     Past Medical History/Comorbidities:   Ms. Osmani Singh  has a past medical history of Atrial septal aneurysm, CAD (coronary artery disease), Dyslipidemia, Heart murmur, History of breast cancer, Hypertension, Osteoarthritis, Paroxysmal atrial fibrillation (HCC), Patent foramen ovale, and Valvular heart disease. Ms. Mushtaq Jensen  has a past surgical history that includes hx mastectomy (Left, ); hx hysterectomy (); hx  section; hx cholecystectomy (); hx cataract removal (Bilateral); hx back surgery; hx rotator cuff repair (Left, ); hx other surgical (); RIGHT KNEE ARTHROPLASTY TOTAL/DEPUY (Right, 2018); LEFT KNEE ARTHROPLASTY TOTAL (Left, 2015); and CHOLECYSTECTOMY LAPAROSCOPIC POSS OPEN (N/A, 3/13/2014).     Active Ambulatory Problems     Diagnosis Date Noted    HTN (hypertension) 2014    Personal history of breast cancer 2014    Hyperlipidemia 2014    Atrial fibrillation ([facility] 2014    Acute cholecystitis 2014    Osteoarthritis 2015    S/P total knee arthroplasty 2015    Perineal abscess 2016    Infection of perineal wound 2016    S/P total knee arthroplasty, right 2018     Resolved Ambulatory Problems     Diagnosis Date Noted    No Resolved Ambulatory Problems     Past Medical History:   Diagnosis Date    Atrial septal aneurysm     CAD (coronary artery disease)     Dyslipidemia     Heart murmur     History of breast cancer     Hypertension     Osteoarthritis     Paroxysmal atrial fibrillation (HCC)     Patent foramen ovale     Valvular heart disease      Social History/Living Environment:        Social History     Socioeconomic History    Marital status:      Spouse name: Not on file    Number of children: Not on file    Years of education: Not on file    Highest education level: Not on file   Social Needs    Financial resource strain: Not on file    Food insecurity - worry: Not on file    Food insecurity - inability: Not on file   EUROBOX needs - medical: Not on file   EUROBOX needs - non-medical: Not on file Occupational History    Not on file   Tobacco Use    Smoking status: Never Smoker    Smokeless tobacco: Never Used   Substance and Sexual Activity    Alcohol use: No    Drug use: No    Sexual activity: Not on file   Other Topics Concern    Not on file   Social History Narrative    Not on file     Prior Level of Function/Work/Activity:  Retired   Dominant Side:         LEFT  Current Medications:    Current Outpatient Medications:     hydroCHLOROthiazide (HYDRODIURIL) 25 mg tablet, Take 25 mg by mouth daily. , Disp: , Rfl:     amiodarone (CORDARONE) 200 mg tablet, Take 200 mg by mouth daily. Indications: VENTRICULAR RATE CONTROL IN ATRIAL FIBRILLATION, Disp: , Rfl:     traMADol (ULTRAM) 50 mg tablet, Take 50 mg by mouth every six (6) hours as needed for Pain. pt may take 1-2 tabs as needed for pain, Disp: , Rfl:     methocarbamol (ROBAXIN) 750 mg tablet, Take 750 mg by mouth four (4) times daily as needed (muscle relaxer). as needed for muscle spasms, Disp: , Rfl:     apixaban (ELIQUIS) 5 mg tablet, Take 5 mg by mouth every twelve (12) hours. , Disp: , Rfl:     celecoxib (CELEBREX) 200 mg capsule, Take 200 mg by mouth two (2) times a day., Disp: , Rfl:     warfarin (COUMADIN) 2 mg tablet, Take 2 mg by mouth daily. , Disp: , Rfl:     senna (SENOKOT) 8.6 mg tablet, Take 2 Tabs by mouth daily. , Disp: , Rfl:     oxyCODONE (OXYIR) 5 mg capsule, Take 5 mg by mouth every four (4) hours as needed for Pain., Disp: , Rfl:     magnesium 250 mg tab, Take 1 Tab by mouth nightly., Disp: , Rfl:     multivitamin (ONE A DAY) tablet, Take 1 Tab by mouth daily. , Disp: , Rfl:     acetaminophen (TYLENOL EXTRA STRENGTH) 500 mg tablet, Take 500 mg by mouth every six (6) hours as needed for Pain., Disp: , Rfl:     calcium-cholecalciferol, d3, 600-125 mg-unit tab, Take 1 Tab by mouth daily. , Disp: , Rfl:     fenofibrate (TRICOR) 160 mg tablet, Take 160 mg by mouth nightly., Disp: , Rfl:     rosuvastatin (CRESTOR) 10 mg tablet, Take 10 mg by mouth nightly., Disp: , Rfl:     benazepril (LOTENSIN) 20 mg tablet, Take 20 mg by mouth daily. Indications: HYPERTENSION, Disp: , Rfl:      Date Last Reviewed:  10/23/2018   EXAMINATION:   Observation/Orthostatic Postural Assessment:    Decreased weight bearing through right LE    Palpation:    Moderate edema noted of right knee     Girth Measurement:   Joint: Eval Date: 10/8/18  Re-Assess Date:  Re-Assess Date:    Mid patellar  Measurement RIGHT LEFT RIGHT LEFT RIGHT LEFT    43 cm 40 cm            AROM/PROM         Joint: Eval Date:  Re-Assess Date:  Re-Assess Date:    Active ROM RIGHT LEFT RIGHT LEFT RIGHT LEFT   hip flexion          Hip ER/IR         Knee flexion  108 122       Knee extension  -2 0       Ankle Dorsiflexion                             Strength:     Eval Date:  Re-Assess Date:  Re-Assess Date:      RIGHT LEFT RIGHT LEFT RIGHT LEFT    Hip flexion 4+/5 4+/5       Hip abd 4+/5 4+/5       Knee Flexion 4/5 4+/5       Knee Extension 4/5 4+/5       Ankle DF 4/5 4/5       Ankle PF 4+/5 4+/5                                             Special Tests:   None     Neurological Screen:  Intact      Functional Mobility:    Transfers: B UE moderate use  Gait: mildly decreased stance phase of right LE--uses sc community ambulation   Stairs: reciprocal pattern with moderate use of hand rails      Balance:    Decreased though right LE       Tool Used: Lower Extremity Functional Scale (LEFS)  Score:  Initial: 40/80 Most Recent: /80 (Date: -- )   Interpretation of Score: 20 questions each scored on a 5 point scale with 0 representing \"extreme difficulty or unable to perform\" and 4 representing \"no difficulty\". The lower the score, the greater the functional disability. 80/80 represents no disability. Minimal detectable change is 9 points. Score 80 79-63 62-48 47-32 31-16 15-1 0   Modifier CH CI CJ CK CL CM CN     ?  Mobility - Walking and Moving Around:     - CURRENT STATUS: CK - 40%-59% impaired, limited or restricted    - GOAL STATUS: CJ - 20%-39% impaired, limited or restricted    - D/C STATUS:  ---------------To be determined---------------    Medical Necessity:   · Patient is expected to demonstrate progress in strength, range of motion, balance and coordination to increase independence with ADLs and community/social activities. Reason for Services/Other Comments:  · Patient continues to require modification of therapeutic interventions to increase complexity of exercises. TREATMENT:   (In addition to Assessment/Re-Assessment sessions the following treatments were rendered)    Pre-treatment Symptoms: pt states that she has been working hard on her HEP and thinks she may have over done it over the weekend-- she states that she worked on [employer]. · Pain: Initial:    5-6/10 Post Session:  0/10     THERAPEUTIC EXERCISE: (15 minutes):  Exercises per grid below to improve mobility, strength and balance. Required minimal visual and verbal cues to promote proper body alignment, promote proper body posture and promote proper body mechanics. Progressed resistance, range, repetitions and complexity of movement as indicated. Date:  10/8/18 Date:  10/15/18 Date:  10/18/18 10/23/18   Activity/Exercise Parameters Parameters Parameters    Education  POC      SLR X 5, 3 sec  X 10, 5 sec      QS X 5  With heel slide      Heel slide  X 5 (with 3 intervals of push) x 10 sec  X 5 R (with 3 intervals of push) x 10 sec      nustep   8 min level  10 min level  12 min level 6   clamshell  X 10 R     SAQ  X 10 R     Side stepping    2 x 50 ft     Step taps    X 20 B     Gait     See Neuro re-ed    LAQ     X 10 B with DF    Figure 4 seated hip stretch      Demo do x 3 B 20 sec                  Neuromuscular Re-education ( 25 min):  Exercise/activities below to improve balance, coordination, kinesthetic sense and posture.   Required moderate visual, verbal, manual and tactile cues to promote static and dynamic balance in standing. · bkwd stepping in // Bars x 5 min with moderate cuing  · Gait 3 x 200 ft focus on increasing stance phase through right LE   · Ant/post rocking on blue foam x 2 min with equal weight bearing      [employer] Portal    MANUAL THERAPY: (15 minutes): Joint mobilization, Soft tissue mobilization and Manipulation was utilized and necessary because of the patient's restricted joint motion and painful spasm. ·  IR/ER hips supine with OP  · Grade IV IR of tib in extension to promote screw home mech  · Lymph drainage technique R knee     MODALITIES: ( 10 minutes):   · vasopneumatic compression using Game ready moderate compression, 34 degrees    Treatment/Session Assessment: heavy cuing for symmetric gait, tends to decrease stance phase through right, mildly, at least 80% of the time during gait. Improved to 50% with neuromuscular re-education activities. B hip tight and weak, will focus on B hip exercises to decrease knee stress and gait instability. Decreased swelling with modalities. ·      ·   Compliance with Program/Exercises: Will assess as treatment progresses. · Recommendations/Intent for next treatment session: \"Next visit will focus on advancements to more challenging activities and reduction in assistance provided\".     Future Appointments   Date Time Provider Sonia Martell   10/25/2018  8:45 AM Rubin Matthew, PT [facility]   10/29/2018  1:00 PM Rubin Matthew, PT [facility]   11/1/2018  8:45 AM Rubin Matthew, PT [facility]   11/5/2018  8:45 AM Rubin Huskaiser, PT [facility]   11/8/2018  8:45 AM Rubin Huskaiser, PT [facility]   11/12/2018  8:45 AM Rubin Matthew, PT [facility]   11/15/2018  8:45 AM Samantha Cisneros PT [facility]       Total Treatment Duration: 65 min  PT Patient Time In/Time Out  Time In: 0845  Time Out: [facility], PT

## 2018-10-25 ENCOUNTER — HOSPITAL ENCOUNTER (OUTPATIENT)
Dept: PHYSICAL THERAPY | Age: 78
Discharge: HOME | End: 2018-10-25
Payer: MEDICARE

## 2018-10-25 PROCEDURE — 97110 THERAPEUTIC EXERCISES: CPT

## 2018-10-25 PROCEDURE — 97140 MANUAL THERAPY 1/> REGIONS: CPT

## 2018-10-25 PROCEDURE — 97016 VASOPNEUMATIC DEVICE THERAPY: CPT

## 2018-10-25 NOTE — PROGRESS NOTES
Sherri Steward  : 1940      [place] / [place] / Product Type: Medicare /  [facility] at [address] [place] [address]. [facility], [address], [facility], [address] [place] [address]  [phone]   [phone]        OUTPATIENT PHYSICAL THERAPY:Progress Note and Daily Note 10/25/2018      ICD-10: Treatment Diagnosis: Pain in Right Knee (M25.561)  Stiffness of Right Knee, Not elsewhere classified (M25.661)  Difficulty in walking, Not elsewhere classified (R26.2)  effusion right knee joint (M25.461)    Precautions/Allergies:   Sulfa (sulfonamide antibiotics) and Tape [adhesive]   Fall Risk Score: 1 (? 5 = High Risk)  MD Orders: Eval and Treat  MEDICAL/REFERRING DIAGNOSIS:  Presence of right artificial knee joint   DATE OF ONSET: 18  REFERRING PHYSICIAN: Pierre Vázquez MD  RETURN PHYSICIAN APPOINTMENT: TBD by patient     Current ASSESSMENT:  Ms. Sherri Steward has attended 5 out patient physical therapy sessions and is currently 9 weeks s/p RTKA. Patient demonstrates full ROM and functional strength, is no longer using assistive device for walking and is independent and safe with all transfers and stair ambulation. She does however, continue to report significant discomfort and moderate edema of knee joint along with lateral calf pain most recently. ( No signs of DVT noted - assessed today during session) This pain has been decreasing ability to sleep and perform full HEP. Pt also still requires cuing for gait symmetry during sessions and does report edema and pain relief with manual techniques and modalities applied but seems temporary. I have encouraged pt to discuss pain with MD at upcoming appointment as I do not want this to become a barrier for reaching goals and full functional mobility. I am recommending continue PT for 4-6 more weeks at this time. PROBLEM LIST (Impacting functional limitations):  1. Decreased Strength  2.  Decreased ADL/Functional Activities  3. Decreased Transfer Abilities  4. Decreased Ambulation Ability/Technique  5. Decreased Balance  6. Increased Pain  7. Decreased Activity Tolerance  8. Decreased [place] with Home Exercise Program INTERVENTIONS PLANNED:  1. Balance Exercise  2. Bed Mobility  3. Cold  4. Cryotherapy  5. Family Education  6. Gait Training  7. Heat  8. Home Exercise Program (HEP)  9. Manual Therapy  10. Neuromuscular Re-education/Strengthening  11. Range of Motion (ROM)  12. Therapeutic Activites  13. Therapeutic Exercise/Strengthening  14. Transfer Training  15. Ultrasound (US)     TREATMENT PLAN:  Effective Dates: 10/8/2018 TO 1/6/2019 (90 days). Frequency/Duration: 2 times a week for 90 Days    GOALS: (Goals have been discussed and agreed upon with patient.)  SHORT-TERM FUNCTIONAL GOALS: Time Frame: 2-4 weeks   1. Pt will be independent with HEP. (met but not consistent at this time due to pain levels)  2. Pt will report R knee pain in no longer constant. (ongoing)  3. Pt will demonstrate mid patellar girth measurements within 1 cm bilaterally. (ongoing)  4. Pt will ambulate safely on all surfaces without use of cane reporting no tripping, falling, LOB at least 2 weeks. (met)  DISCHARGE GOALS: Time Frame: 6-8 weeks   1. Pt will improve LEFS score by at least 10 points. (met)  2. Pt will sit to stand TF 5/5 times without use of UE. (met)  3. Pt will ambulate stairs with recip gait pattern, ascending and descending no hand rail safely (ongoing)   4. Pt will demonstrate improved strength of right LE to at least 4+/5 without pain during testing. (ongoing)  REHABILITATION POTENTIAL FOR STATED GOALS: Good                HISTORY:   History of Present Injury/Illness (Reason for Referral):  Pt states right knee pain since March 2018, conservative treatments failed leading to R TKA on 8/23/18. In hospital 2 nights, [facility] for 3 weeks then HHPT 2 weeks. Continues to perform HEP independently currently.    Pt reports doing well overall at this point, gait steadily improving but using sc for community distances, no assist in home. Driving independently. Chief c/o's pain and swelling. Current symptoms:  Constant anterior knee pain and scar sensitive, weakness B LE, edema     · Aggravating factors: disrupting sleep, stairs, knee extension, sitting prolonged     · Relieving factors:  Tylenol, ice, tramadol    Pain level:  5/10 presently, 5/10 worst, 3/10 best     Past Medical History/Comorbidities:   Ms. Mena Mae  has a past medical history of Atrial septal aneurysm, CAD (coronary artery disease), Dyslipidemia, Heart murmur, History of breast cancer, Hypertension, Osteoarthritis, Paroxysmal atrial fibrillation ([address] Patent foramen ovale, and Valvular heart disease. Ms. Mena Mae  has a past surgical history that includes hx mastectomy (Left, ); hx hysterectomy (); hx  section; hx cholecystectomy (); hx cataract removal (Bilateral); hx back surgery; hx rotator cuff repair (Left, ); hx other surgical (); RIGHT KNEE ARTHROPLASTY TOTAL/DEPUY (Right, 2018); LEFT KNEE ARTHROPLASTY TOTAL (Left, 2015); and CHOLECYSTECTOMY LAPAROSCOPIC POSS OPEN (N/A, 3/13/2014).     Active Ambulatory Problems     Diagnosis Date Noted    HTN (hypertension) 2014    Personal history of breast cancer 2014    Hyperlipidemia 2014    Atrial fibrillation ([facility] [address] 2014    Acute cholecystitis 2014    Osteoarthritis 2015    S/P total knee arthroplasty 2015    Perineal abscess 2016    Infection of perineal wound 2016    S/P total knee arthroplasty, right 2018     Resolved Ambulatory Problems     Diagnosis Date Noted    No Resolved Ambulatory Problems     Past Medical History:   Diagnosis Date    Atrial septal aneurysm     CAD (coronary artery disease)     Dyslipidemia     Heart murmur     History of breast cancer     Hypertension     Osteoarthritis     Paroxysmal atrial fibrillation (HCC)     Patent foramen ovale     Valvular heart disease      Social History/Living Environment:        Social History     Socioeconomic History    Marital status:      Spouse name: Not on file    Number of children: Not on file    Years of education: Not on file    Highest education level: Not on file   Social Needs    Financial resource strain: Not on file    Food insecurity - worry: Not on file    Food insecurity - inability: Not on file   [employer] needs - medical: Not on file   [employer] needs - non-medical: Not on file   Occupational History    Not on file   Tobacco Use    Smoking status: Never Smoker    Smokeless tobacco: Never Used   Substance and Sexual Activity    Alcohol use: No    Drug use: No    Sexual activity: Not on file   Other Topics Concern    Not on file   Social History Narrative    Not on file     Prior Level of Function/Work/Activity:  Retired   Dominant Side:         LEFT  Current Medications:    Current Outpatient Medications:     hydroCHLOROthiazide (HYDRODIURIL) 25 mg tablet, Take 25 mg by mouth daily. , Disp: , Rfl:     amiodarone (CORDARONE) 200 mg tablet, Take 200 mg by mouth daily. Indications: VENTRICULAR RATE CONTROL IN ATRIAL FIBRILLATION, Disp: , Rfl:     traMADol (ULTRAM) 50 mg tablet, Take 50 mg by mouth every six (6) hours as needed for Pain. pt may take 1-2 tabs as needed for pain, Disp: , Rfl:     methocarbamol (ROBAXIN) 750 mg tablet, Take 750 mg by mouth four (4) times daily as needed (muscle relaxer). as needed for muscle spasms, Disp: , Rfl:     apixaban (ELIQUIS) 5 mg tablet, Take 5 mg by mouth every twelve (12) hours. , Disp: , Rfl:     celecoxib (CELEBREX) 200 mg capsule, Take 200 mg by mouth two (2) times a day., Disp: , Rfl:     warfarin (COUMADIN) 2 mg tablet, Take 2 mg by mouth daily. , Disp: , Rfl:     senna (SENOKOT) 8.6 mg tablet, Take 2 Tabs by mouth daily. , Disp: , Rfl:     oxyCODONE (OXYIR) 5 mg capsule, Take 5 mg by mouth every four (4) hours as needed for Pain., Disp: , Rfl:     magnesium 250 mg tab, Take 1 Tab by mouth nightly., Disp: , Rfl:     multivitamin (ONE A DAY) tablet, Take 1 Tab by mouth daily. , Disp: , Rfl:     acetaminophen (TYLENOL EXTRA STRENGTH) 500 mg tablet, Take 500 mg by mouth every six (6) hours as needed for Pain., Disp: , Rfl:     calcium-cholecalciferol, d3, 600-125 mg-unit tab, Take 1 Tab by mouth daily. , Disp: , Rfl:     fenofibrate (TRICOR) 160 mg tablet, Take 160 mg by mouth nightly., Disp: , Rfl:     rosuvastatin (CRESTOR) 10 mg tablet, Take 10 mg by mouth nightly., Disp: , Rfl:     benazepril (LOTENSIN) 20 mg tablet, Take 20 mg by mouth daily. Indications: HYPERTENSION, Disp: , Rfl:      Date Last Reviewed:  10/25/2018   EXAMINATION: updated 10/25/18   Observation/Orthostatic Postural Assessment:    Decreased weight bearing through right LE    Palpation:    Tender of lateral left calf      Girth Measurement:   Joint: Eval Date: 10/8/18  Re-Assess Date: 10/25/18    Mid patellar  Measurement RIGHT LEFT RIGHT LEFT    43 cm 40 cm 42 cm  40 cm         AROM/PROM       Joint: Eval Date:  Re-Assess Date:10/25/18    Active ROM RIGHT LEFT RIGHT LEFT   hip flexion        Hip ER/IR       Knee flexion  108 122 120 122   Knee extension  -2 0 0 0   Ankle Dorsiflexion                       Strength:     Eval Date:  Re-Assess Date:10/25/18      RIGHT LEFT RIGHT LEFT    Hip flexion 4+/5 4+/5 4+/5 4+/5   Hip abd 4+/5 4+/5 4+/5 4+/5   Knee Flexion 4/5 4+/5 4/5 4+/5   Knee Extension 4/5 4+/5 4+/5  4+/5   Ankle DF 4/5 4/5 4+/5 4+/5   Ankle PF 4+/5 4+/5 4+/5 4+/5                                   Functional Mobility:    · Transfers:sit to stand with no use of UE, good control    · Gait: mildly decreased stance phase of right LE, able to correct with cuing.   No assistive device 100% time   · Stairs: reciprocal pattern with mild use of hand rails for balance Balance:    · Decreased though right LE       Tool Used: Lower Extremity Functional Scale (LEFS)  Score:  Initial: 40/80 Most Recent: 67/80 (Date: 10/25/18 )   Interpretation of Score: 20 questions each scored on a 5 point scale with 0 representing \"extreme difficulty or unable to perform\" and 4 representing \"no difficulty\". The lower the score, the greater the functional disability. 80/80 represents no disability. Minimal detectable change is 9 points. Score 80 79-63 62-48 47-32 31-16 15-1 0   Modifier CH CI CJ CK CL CM CN     ? Mobility - Walking and Moving Around:     - CURRENT STATUS: CI - 1%-19% impaired, limited or restricted    - GOAL STATUS: CJ - 20%-39% impaired, limited or restricted    - D/C STATUS:  ---------------To be determined---------------    Medical Necessity:   · Patient is expected to demonstrate progress in strength, range of motion, balance and coordination to increase independence with ADLs and community/social activities. Reason for Services/Other Comments:  · Patient continues to require modification of therapeutic interventions to increase complexity of exercises. TREATMENT:   (In addition to Assessment/Re-Assessment sessions the following treatments were rendered)    Pre-treatment Symptoms: pt states that she is having significant pain of left knee as well as left fibular region. She states that pain has been keeping her up at night. She states that she is making improvements with right hip mobility however. · Pain: Initial:    5-6/10 Post Session:  4/10     THERAPEUTIC EXERCISE: (25 minutes):  Exercises per grid below to improve mobility, strength and balance. Required minimal visual and verbal cues to promote proper body alignment, promote proper body posture and promote proper body mechanics. Progressed resistance, range, repetitions and complexity of movement as indicated.      Date:  10/8/18 Date:  10/15/18 Date:  10/18/18 10/23/18 10/25/18 Activity/Exercise Parameters Parameters Parameters     Education  POC    Importance of HEP, edema control, gait    SLR X 5, 3 sec  X 10, 5 sec       QS X 5  With heel slide       Heel slide  X 5 (with 3 intervals of push) x 10 sec  X 5 R (with 3 intervals of push) x 10 sec    X 5 with sustained hold x 20 sec    nustep   8 min level  10 min level  12 min level 6 10 min level 6   clamshell  X 10 R      SAQ  X 10 R      Side stepping    2 x 50 ft      Step taps    X 20 B      Gait     See Neuro re-ed     LAQ     X 10 B with DF     Figure 4 seated hip stretch      Demo do x 3 B 20 sec HEP   Prone knee flexion      X 10 right with manual resist to return to extension    gastroc stretch      B 3 x 20 sec     Neuromuscular Re-education (  min):  Exercise/activities below to improve balance, coordination, kinesthetic sense and posture. Required moderate visual, verbal, manual and tactile cues to promote static and dynamic balance in standing. · bkwd stepping in // Bars x 5 min with moderate cuing  · Gait 3 x 200 ft focus on increasing stance phase through right LE   · Ant/post rocking on blue foam x 2 min with equal weight bearing      Med[employer] Portal    MANUAL THERAPY: (30 minutes): Joint mobilization, Soft tissue mobilization and Manipulation was utilized and necessary because of the patient's restricted joint motion and painful spasm. · Patellar mobilization all planes 5 x 10 sec each, right knee   · Lymph drainage technique R knee   · Lateral gastroc STM   · prox tib fib mob all planes      MODALITIES: ( 10 minutes):   · vasopneumatic compression using Game ready moderate compression, 34 degrees    Treatment/Session Assessment: assessed calf for signs of DVT, due to location of pain:  (-) holmans testing, pt with no pain during use of Gastroc/soleus during weight bearing, no discoloration or defined lump of lower leg. pt performed exercises above well.   Focused on non weight bearing activities and manual techniques to decrease stiffness and improve edema drainage. Pt with less pain and decreased swelling at end of session today. ·      ·   Compliance with Program/Exercises: Will assess as treatment progresses. · Recommendations/Intent for next treatment session: \"Next visit will focus on advancements to more challenging activities and reduction in assistance provided\".     Future Appointments   Date Time Provider Sonia Martell   10/29/2018  1:00 PM Percy Vargas, PT [facility]   11/1/2018  8:45 AM [facility]   11/5/2018  8:45 AM [facility], [facility]   11/8/2018  8:45 [facility]   11/12/2018  8:45 AM [facility]   11/15/2018  8:45 AM Misha Garcia, PT [facility]       Total Treatment Duration: 65 min  PT Patient Time In/Time Out  Time In: 0845  Time Out: [address], PT

## 2018-10-29 ENCOUNTER — HOSPITAL ENCOUNTER (OUTPATIENT)
Dept: PHYSICAL THERAPY | Age: 78
Discharge: HOME | End: 2018-10-29
Payer: MEDICARE

## 2018-10-29 PROCEDURE — 97016 VASOPNEUMATIC DEVICE THERAPY: CPT

## 2018-10-29 PROCEDURE — 97110 THERAPEUTIC EXERCISES: CPT

## 2018-10-29 PROCEDURE — 97140 MANUAL THERAPY 1/> REGIONS: CPT

## 2018-10-29 NOTE — PROGRESS NOTES
Tayla Richards  : 1940      [place] / [place] / Product Type: Medicare /  [address] at [address] [place] [address]. [facility], [address], [facility], [address] [place] [address]  [phone]   [phone]        OUTPATIENT PHYSICAL THERAPY: Daily Note 10/29/2018      ICD-10: Treatment Diagnosis: Pain in Right Knee (M25.561)  Stiffness of Right Knee, Not elsewhere classified (M25.661)  Difficulty in walking, Not elsewhere classified (R26.2)  effusion right knee joint (M25.461)    Precautions/Allergies:   Sulfa (sulfonamide antibiotics) and Tape [adhesive]   Fall Risk Score: 1 (? 5 = High Risk)  MD Orders: Eval and Treat  MEDICAL/REFERRING DIAGNOSIS:  Presence of right artificial knee joint   DATE OF ONSET: 18  REFERRING PHYSICIAN: Alys Kanner, MD  RETURN PHYSICIAN APPOINTMENT: TBD by patient     Current ASSESSMENT:  Ms. Tayla Richards has attended 5 out patient physical therapy sessions and is currently 9 weeks s/p RTKA. Patient demonstrates full ROM and functional strength, is no longer using assistive device for walking and is independent and safe with all transfers and stair ambulation. She does however, continue to report significant discomfort and moderate edema of knee joint along with lateral calf pain most recently. ( No signs of DVT noted - assessed today during session) This pain has been decreasing ability to sleep and perform full HEP. Pt also still requires cuing for gait symmetry during sessions and does report edema and pain relief with manual techniques and modalities applied but seems temporary. I have encouraged pt to discuss pain with MD at upcoming appointment as I do not want this to become a barrier for reaching goals and full functional mobility. I am recommending continue PT for 4-6 more weeks at this time. PROBLEM LIST (Impacting functional limitations):  1. Decreased Strength  2.  Decreased ADL/Functional Activities  3. Decreased Transfer Abilities  4. Decreased Ambulation Ability/Technique  5. Decreased Balance  6. Increased Pain  7. Decreased Activity Tolerance  8. Decreased [place] with Home Exercise Program INTERVENTIONS PLANNED:  1. Balance Exercise  2. Bed Mobility  3. Cold  4. Cryotherapy  5. Family Education  6. Gait Training  7. Heat  8. Home Exercise Program (HEP)  9. Manual Therapy  10. Neuromuscular Re-education/Strengthening  11. Range of Motion (ROM)  12. Therapeutic Activites  13. Therapeutic Exercise/Strengthening  14. Transfer Training  15. Ultrasound (US)     TREATMENT PLAN:  Effective Dates: 10/8/2018 TO 1/6/2019 (90 days). Frequency/Duration: 2 times a week for 90 Days    GOALS: (Goals have been discussed and agreed upon with patient.)  SHORT-TERM FUNCTIONAL GOALS: Time Frame: 2-4 weeks   1. Pt will be independent with HEP. (met but not consistent at this time due to pain levels)  2. Pt will report R knee pain in no longer constant. (ongoing)  3. Pt will demonstrate mid patellar girth measurements within 1 cm bilaterally. (ongoing)  4. Pt will ambulate safely on all surfaces without use of cane reporting no tripping, falling, LOB at least 2 weeks. (met)  DISCHARGE GOALS: Time Frame: 6-8 weeks   1. Pt will improve LEFS score by at least 10 points. (met)  2. Pt will sit to stand TF 5/5 times without use of UE. (met)  3. Pt will ambulate stairs with recip gait pattern, ascending and descending no hand rail safely (ongoing)   4. Pt will demonstrate improved strength of right LE to at least 4+/5 without pain during testing. (ongoing)  REHABILITATION POTENTIAL FOR STATED GOALS: Good                HISTORY:   History of Present Injury/Illness (Reason for Referral):  Pt states right knee pain since March 2018, conservative treatments failed leading to R TKA on 8/23/18. In hospital 2 nights, [facility] for 3 weeks then HHPT 2 weeks. Continues to perform HEP independently currently.    Pt reports doing well overall at this point, gait steadily improving but using sc for community distances, no assist in home. Driving independently. Chief c/o's pain and swelling. Current symptoms:  Constant anterior knee pain and scar sensitive, weakness B LE, edema     · Aggravating factors: disrupting sleep, stairs, knee extension, sitting prolonged     · Relieving factors:  Tylenol, ice, tramadol    Pain level:  5/10 presently, 5/10 worst, 3/10 best     Past Medical History/Comorbidities:   Ms. Lea Balbuena  has a past medical history of Atrial septal aneurysm, CAD (coronary artery disease), Dyslipidemia, Heart murmur, History of breast cancer, Hypertension, Osteoarthritis, Paroxysmal atrial fibrillation ([address] Patent foramen ovale, and Valvular heart disease. Ms. Lea Balbuena  has a past surgical history that includes hx mastectomy (Left, ); hx hysterectomy (); hx  section; hx cholecystectomy (); hx cataract removal (Bilateral); hx back surgery; hx rotator cuff repair (Left, ); hx other surgical (); RIGHT KNEE ARTHROPLASTY TOTAL/DEPUY (Right, 2018); LEFT KNEE ARTHROPLASTY TOTAL (Left, 2015); and CHOLECYSTECTOMY LAPAROSCOPIC POSS OPEN (N/A, 3/13/2014).     Active Ambulatory Problems     Diagnosis Date Noted    HTN (hypertension) 2014    Personal history of breast cancer 2014    Hyperlipidemia 2014    Atrial fibrillation ([facility] [address] 2014    Acute cholecystitis 2014    Osteoarthritis 2015    S/P total knee arthroplasty 2015    Perineal abscess 2016    Infection of perineal wound 2016    S/P total knee arthroplasty, right 2018     Resolved Ambulatory Problems     Diagnosis Date Noted    No Resolved Ambulatory Problems     Past Medical History:   Diagnosis Date    Atrial septal aneurysm     CAD (coronary artery disease)     Dyslipidemia     Heart murmur     History of breast cancer     Hypertension     Osteoarthritis     Paroxysmal atrial fibrillation (HCC)     Patent foramen ovale     Valvular heart disease      Social History/Living Environment:        Social History     Socioeconomic History    Marital status:      Spouse name: Not on file    Number of children: Not on file    Years of education: Not on file    Highest education level: Not on file   Social Needs    Financial resource strain: Not on file    Food insecurity - worry: Not on file    Food insecurity - inability: Not on file   [employer] needs - medical: Not on file   [employer] needs - non-medical: Not on file   Occupational History    Not on file   Tobacco Use    Smoking status: Never Smoker    Smokeless tobacco: Never Used   Substance and Sexual Activity    Alcohol use: No    Drug use: No    Sexual activity: Not on file   Other Topics Concern    Not on file   Social History Narrative    Not on file     Prior Level of Function/Work/Activity:  Retired   Dominant Side:         LEFT  Current Medications:    Current Outpatient Medications:     hydroCHLOROthiazide (HYDRODIURIL) 25 mg tablet, Take 25 mg by mouth daily. , Disp: , Rfl:     amiodarone (CORDARONE) 200 mg tablet, Take 200 mg by mouth daily. Indications: VENTRICULAR RATE CONTROL IN ATRIAL FIBRILLATION, Disp: , Rfl:     traMADol (ULTRAM) 50 mg tablet, Take 50 mg by mouth every six (6) hours as needed for Pain. pt may take 1-2 tabs as needed for pain, Disp: , Rfl:     methocarbamol (ROBAXIN) 750 mg tablet, Take 750 mg by mouth four (4) times daily as needed (muscle relaxer). as needed for muscle spasms, Disp: , Rfl:     apixaban (ELIQUIS) 5 mg tablet, Take 5 mg by mouth every twelve (12) hours. , Disp: , Rfl:     celecoxib (CELEBREX) 200 mg capsule, Take 200 mg by mouth two (2) times a day., Disp: , Rfl:     warfarin (COUMADIN) 2 mg tablet, Take 2 mg by mouth daily. , Disp: , Rfl:     senna (SENOKOT) 8.6 mg tablet, Take 2 Tabs by mouth daily. , Disp: , Rfl:     oxyCODONE (OXYIR) 5 mg capsule, Take 5 mg by mouth every four (4) hours as needed for Pain., Disp: , Rfl:     magnesium 250 mg tab, Take 1 Tab by mouth nightly., Disp: , Rfl:     multivitamin (ONE A DAY) tablet, Take 1 Tab by mouth daily. , Disp: , Rfl:     acetaminophen (TYLENOL EXTRA STRENGTH) 500 mg tablet, Take 500 mg by mouth every six (6) hours as needed for Pain., Disp: , Rfl:     calcium-cholecalciferol, d3, 600-125 mg-unit tab, Take 1 Tab by mouth daily. , Disp: , Rfl:     fenofibrate (TRICOR) 160 mg tablet, Take 160 mg by mouth nightly., Disp: , Rfl:     rosuvastatin (CRESTOR) 10 mg tablet, Take 10 mg by mouth nightly., Disp: , Rfl:     benazepril (LOTENSIN) 20 mg tablet, Take 20 mg by mouth daily. Indications: HYPERTENSION, Disp: , Rfl:      Date Last Reviewed:  10/29/2018   EXAMINATION: updated 10/25/18   Observation/Orthostatic Postural Assessment:    Decreased weight bearing through right LE    Palpation:    Tender of lateral left calf      Girth Measurement:   Joint: Eval Date: 10/8/18  Re-Assess Date: 10/25/18    Mid patellar  Measurement RIGHT LEFT RIGHT LEFT    43 cm 40 cm 42 cm  40 cm         AROM/PROM       Joint: Eval Date:  Re-Assess Date:10/25/18    Active ROM RIGHT LEFT RIGHT LEFT   hip flexion        Hip ER/IR       Knee flexion  108 122 120 122   Knee extension  -2 0 0 0   Ankle Dorsiflexion                       Strength:     Eval Date:  Re-Assess Date:10/25/18      RIGHT LEFT RIGHT LEFT    Hip flexion 4+/5 4+/5 4+/5 4+/5   Hip abd 4+/5 4+/5 4+/5 4+/5   Knee Flexion 4/5 4+/5 4/5 4+/5   Knee Extension 4/5 4+/5 4+/5  4+/5   Ankle DF 4/5 4/5 4+/5 4+/5   Ankle PF 4+/5 4+/5 4+/5 4+/5                                   Functional Mobility:    · Transfers:sit to stand with no use of UE, good control    · Gait: mildly decreased stance phase of right LE, able to correct with cuing.   No assistive device 100% time   · Stairs: reciprocal pattern with mild use of hand rails for balance       Balance: · Decreased though right LE       Tool Used: Lower Extremity Functional Scale (LEFS)  Score:  Initial: 40/80 Most Recent: 67/80 (Date: 10/25/18 )   Interpretation of Score: 20 questions each scored on a 5 point scale with 0 representing \"extreme difficulty or unable to perform\" and 4 representing \"no difficulty\". The lower the score, the greater the functional disability. 80/80 represents no disability. Minimal detectable change is 9 points. Score 80 79-63 62-48 47-32 31-16 15-1 0   Modifier CH CI CJ CK CL CM CN     ? Mobility - Walking and Moving Around:     - CURRENT STATUS: CI - 1%-19% impaired, limited or restricted    - GOAL STATUS: CJ - 20%-39% impaired, limited or restricted    - D/C STATUS:  ---------------To be determined---------------    Medical Necessity:   · Patient is expected to demonstrate progress in strength, range of motion, balance and coordination to increase independence with ADLs and community/social activities. Reason for Services/Other Comments:  · Patient continues to require modification of therapeutic interventions to increase complexity of exercises. TREATMENT:   (In addition to Assessment/Re-Assessment sessions the following treatments were rendered)    Pre-treatment Symptoms: pt states that the calf is better but knee still painful. · Pain: Initial:    4/10  Post Session:  4/10     THERAPEUTIC EXERCISE: (25 minutes):  Exercises per grid below to improve mobility, strength and balance. Required minimal visual and verbal cues to promote proper body alignment, promote proper body posture and promote proper body mechanics. Progressed resistance, range, repetitions and complexity of movement as indicated.      Date:  10/8/18 Date:  10/15/18 Date:  10/18/18 10/23/18 10/25/18 10/29/18 10/30/18   Activity/Exercise Parameters Parameters Parameters       Education  POC    Importance of HEP, edema control, gait      SLR X 5, 3 sec  X 10, 5 sec         QS X 5  With heel slide         Heel slide  X 5 (with 3 intervals of push) x 10 sec  X 5 R (with 3 intervals of push) x 10 sec    X 5 with sustained hold x 20 sec   With manual op   nustep   8 min level  10 min level  12 min level 6 10 min level 6 10 min level 6  10 min level 6    clamshell  X 10 R        SAQ  X 10 R        Side stepping    2 x 50 ft     In ladder x 4 laps    Step taps    X 20 B        Gait     See Neuro re-ed   Fwd in ladder     LAQ     X 10 B with DF       Figure 4 seated hip stretch      Demo do x 3 B 20 sec HEP     Prone knee flexion      X 10 right with manual resist to return to extension      gastroc stretch      B 3 x 20 sec       Neuromuscular Re-education (  min):  Exercise/activities below to improve balance, coordination, kinesthetic sense and posture. Required moderate visual, verbal, manual and tactile cues to promote static and dynamic balance in standing. · bkwd stepping in // Bars x 5 min with moderate cuing  · Gait 3 x 200 ft focus on increasing stance phase through right LE   · Ant/post rocking on blue foam x 2 min with equal weight bearing      [employer] Portal    MANUAL THERAPY: (30 minutes): Joint mobilization, Soft tissue mobilization and Manipulation was utilized and necessary because of the patient's restricted joint motion and painful spasm. · Patellar mobilization all planes 5 x 10 sec each, right knee   · Lymph drainage technique R knee   · Lateral gastroc STM   · prox tib fib mob all planes    · Supine knee flexion mob grad III    MODALITIES: ( 10 minutes):   · vasopneumatic compression using Game ready moderate compression, 34 degrees    Treatment/Session Assessment: pt with much less pain during treatment today. Edema continues to be of lower leg and ankle as well but responds well to elevation and vasopnuematic modalities. Encouraged pt to increase elevation time at home (at least 10 min 3-4 times a day) with ankle pumps and ice. Maintained ROM of right knee today. angeline to need cuing for standing activies--weight shifting to right LE--demonstrates understanding at end of activities. ·      ·   Compliance with Program/Exercises: Will assess as treatment progresses. · Recommendations/Intent for next treatment session: \"Next visit will focus on advancements to more challenging activities and reduction in assistance provided\".     Future Appointments   Date Time Provider Sonia Martell   11/1/2018  8:45 AM Lashon Fleming, PT [facility]   11/5/2018  8:45 AM Lashon Fleming, PT [facility]   11/8/2018  8:45 AM Lashon Fleming, PT [facility]   11/12/2018  8:45 AM [facility] Fleming[facility]   11/15/2018  8:45 AM Honey Torres, PT [facility]       Total Treatment Duration: 65 min  PT Patient Time In/Time Out  Time In: 1300  Time Out: Catrina PT

## 2018-11-01 ENCOUNTER — HOSPITAL ENCOUNTER (OUTPATIENT)
Dept: PHYSICAL THERAPY | Age: 78
Discharge: HOME | End: 2018-11-01
Payer: MEDICARE

## 2018-11-01 PROCEDURE — 97016 VASOPNEUMATIC DEVICE THERAPY: CPT

## 2018-11-01 PROCEDURE — 97140 MANUAL THERAPY 1/> REGIONS: CPT

## 2018-11-01 PROCEDURE — 97110 THERAPEUTIC EXERCISES: CPT

## 2018-11-01 NOTE — PROGRESS NOTES
Abelino Rivera  : 1940      [place] / [place] PART [place] B / Product Type: Medicare /  [employer] [address] [facility] [address]. [facility], [address], [facility], [address] [place] [address]  [phone]   [phone]        OUTPATIENT PHYSICAL THERAPY: Daily Note 2018      ICD-10: Treatment Diagnosis: Pain in Right Knee (M25.561)  Stiffness of Right Knee, Not elsewhere classified (M25.661)  Difficulty in walking, Not elsewhere classified (R26.2)  effusion right knee joint (M25.461)    Precautions/Allergies:   Sulfa (sulfonamide antibiotics) and Tape [adhesive]   Fall Risk Score: 1 (? 5 = High Risk)  MD Orders: Eval and Treat  MEDICAL/REFERRING DIAGNOSIS:  Presence of right artificial knee joint   DATE OF ONSET: 18  REFERRING PHYSICIAN: Saint Shadow, MD  RETURN PHYSICIAN APPOINTMENT: TBD by patient     Current ASSESSMENT:  Ms. Abelino Rivera has attended 5 out patient physical therapy sessions and is currently 9 weeks s/p RTKA. Patient demonstrates full ROM and functional strength, is no longer using assistive device for walking and is independent and safe with all transfers and stair ambulation. She does however, continue to report significant discomfort and moderate edema of knee joint along with lateral calf pain most recently. ( No signs of DVT noted - assessed today during session) This pain has been decreasing ability to sleep and perform full HEP. Pt also still requires cuing for gait symmetry during sessions and does report edema and pain relief with manual techniques and modalities applied but seems temporary. I have encouraged pt to discuss pain with MD at upcoming appointment as I do not want this to become a barrier for reaching goals and full functional mobility. I am recommending continue PT for 4-6 more weeks at this time. PROBLEM LIST (Impacting functional limitations):  1. Decreased Strength  2.  Decreased ADL/Functional Activities  3. Decreased Transfer Abilities  4. Decreased Ambulation Ability/Technique  5. Decreased Balance  6. Increased Pain  7. Decreased Activity Tolerance  8. Decreased Ritchie with Home Exercise Program INTERVENTIONS PLANNED:  1. Balance Exercise  2. Bed Mobility  3. Cold  4. Cryotherapy  5. Family Education  6. Gait Training  7. Heat  8. Home Exercise Program (HEP)  9. Manual Therapy  10. Neuromuscular Re-education/Strengthening  11. Range of Motion (ROM)  12. Therapeutic Activites  13. Therapeutic Exercise/Strengthening  14. Transfer Training  15. Ultrasound (US)     TREATMENT PLAN:  Effective Dates: 10/8/2018 TO 1/6/2019 (90 days). Frequency/Duration: 2 times a week for 90 Days    GOALS: (Goals have been discussed and agreed upon with patient.)  SHORT-TERM FUNCTIONAL GOALS: Time Frame: 2-4 weeks   1. Pt will be independent with HEP. (met but not consistent at this time due to pain levels)  2. Pt will report R knee pain in no longer constant. (ongoing)  3. Pt will demonstrate mid patellar girth measurements within 1 cm bilaterally. (ongoing)  4. Pt will ambulate safely on all surfaces without use of cane reporting no tripping, falling, LOB at least 2 weeks. (met)  DISCHARGE GOALS: Time Frame: 6-8 weeks   1. Pt will improve LEFS score by at least 10 points. (met)  2. Pt will sit to stand TF 5/5 times without use of UE. (met)  3. Pt will ambulate stairs with recip gait pattern, ascending and descending no hand rail safely (ongoing)   4. Pt will demonstrate improved strength of right LE to at least 4+/5 without pain during testing. (ongoing)  REHABILITATION POTENTIAL FOR STATED GOALS: Good                HISTORY:   History of Present Injury/Illness (Reason for Referral):  Pt states right knee pain since March 2018, conservative treatments failed leading to R TKA on 8/23/18. In hospital 2 nights, [facility] for 3 weeks then HHPT 2 weeks. Continues to perform HEP independently currently.    Pt reports doing well overall at this point, gait steadily improving but using sc for community distances, no assist in home. Driving independently. Chief c/o's pain and swelling. Current symptoms:  Constant anterior knee pain and scar sensitive, weakness B LE, edema     · Aggravating factors: disrupting sleep, stairs, knee extension, sitting prolonged     · Relieving factors:  Tylenol, ice, tramadol    Pain level:  5/10 presently, 5/10 worst, 3/10 best     Past Medical History/Comorbidities:   Ms. Mushtaq Jensen  has a past medical history of Atrial septal aneurysm, CAD (coronary artery disease), Dyslipidemia, Heart murmur, History of breast cancer, Hypertension, Osteoarthritis, Paroxysmal atrial fibrillation ([address] Patent foramen ovale, and Valvular heart disease. Ms. Mushtaq Jensen  has a past surgical history that includes hx mastectomy (Left, ); hx hysterectomy (); hx  section; hx cholecystectomy (); hx cataract removal (Bilateral); hx back surgery; hx rotator cuff repair (Left, ); hx other surgical (); RIGHT KNEE ARTHROPLASTY TOTAL/DEPUY (Right, 2018); LEFT KNEE ARTHROPLASTY TOTAL (Left, 2015); and CHOLECYSTECTOMY LAPAROSCOPIC POSS OPEN (N/A, 3/13/2014).     Active Ambulatory Problems     Diagnosis Date Noted    HTN (hypertension) 2014    Personal history of breast cancer 2014    Hyperlipidemia 2014    Atrial fibrillation ([address] 2014    Acute cholecystitis 2014    Osteoarthritis 2015    S/P total knee arthroplasty 2015    Perineal abscess 2016    Infection of perineal wound 2016    S/P total knee arthroplasty, right 2018     Resolved Ambulatory Problems     Diagnosis Date Noted    No Resolved Ambulatory Problems     Past Medical History:   Diagnosis Date    Atrial septal aneurysm     CAD (coronary artery disease)     Dyslipidemia     Heart murmur     History of breast cancer     Hypertension     Osteoarthritis     Paroxysmal atrial fibrillation (HCC)     Patent foramen ovale     Valvular heart disease      Social History/Living Environment:        Social History     Socioeconomic History    Marital status:      Spouse name: Not on file    Number of children: Not on file    Years of education: Not on file    Highest education level: Not on file   Social Needs    Financial resource strain: Not on file    Food insecurity - worry: Not on file    Food insecurity - inability: Not on file   [employer] needs - medical: Not on file   [employer] needs - non-medical: Not on file   Occupational History    Not on file   Tobacco Use    Smoking status: Never Smoker    Smokeless tobacco: Never Used   Substance and Sexual Activity    Alcohol use: No    Drug use: No    Sexual activity: Not on file   Other Topics Concern    Not on file   Social History Narrative    Not on file     Prior Level of Function/Work/Activity:  Retired   Dominant Side:         LEFT  Current Medications:    Current Outpatient Medications:     hydroCHLOROthiazide (HYDRODIURIL) 25 mg tablet, Take 25 mg by mouth daily. , Disp: , Rfl:     amiodarone (CORDARONE) 200 mg tablet, Take 200 mg by mouth daily. Indications: VENTRICULAR RATE CONTROL IN ATRIAL FIBRILLATION, Disp: , Rfl:     traMADol (ULTRAM) 50 mg tablet, Take 50 mg by mouth every six (6) hours as needed for Pain. pt may take 1-2 tabs as needed for pain, Disp: , Rfl:     methocarbamol (ROBAXIN) 750 mg tablet, Take 750 mg by mouth four (4) times daily as needed (muscle relaxer). as needed for muscle spasms, Disp: , Rfl:     apixaban (ELIQUIS) 5 mg tablet, Take 5 mg by mouth every twelve (12) hours. , Disp: , Rfl:     celecoxib (CELEBREX) 200 mg capsule, Take 200 mg by mouth two (2) times a day., Disp: , Rfl:     warfarin (COUMADIN) 2 mg tablet, Take 2 mg by mouth daily. , Disp: , Rfl:     senna (SENOKOT) 8.6 mg tablet, Take 2 Tabs by mouth daily. , Disp: , Rfl:     oxyCODONE (OXYIR) 5 mg capsule, Take 5 mg by mouth every four (4) hours as needed for Pain., Disp: , Rfl:     magnesium 250 mg tab, Take 1 Tab by mouth nightly., Disp: , Rfl:     multivitamin (ONE A DAY) tablet, Take 1 Tab by mouth daily. , Disp: , Rfl:     acetaminophen (TYLENOL EXTRA STRENGTH) 500 mg tablet, Take 500 mg by mouth every six (6) hours as needed for Pain., Disp: , Rfl:     calcium-cholecalciferol, d3, 600-125 mg-unit tab, Take 1 Tab by mouth daily. , Disp: , Rfl:     fenofibrate (TRICOR) 160 mg tablet, Take 160 mg by mouth nightly., Disp: , Rfl:     rosuvastatin (CRESTOR) 10 mg tablet, Take 10 mg by mouth nightly., Disp: , Rfl:     benazepril (LOTENSIN) 20 mg tablet, Take 20 mg by mouth daily. Indications: HYPERTENSION, Disp: , Rfl:      Date Last Reviewed:  11/1/2018   EXAMINATION: updated 10/25/18   Observation/Orthostatic Postural Assessment:    Decreased weight bearing through right LE    Palpation:    Tender of lateral left calf      Girth Measurement:   Joint: Eval Date: 10/8/18  Re-Assess Date: 10/25/18    Mid patellar  Measurement RIGHT LEFT RIGHT LEFT    43 cm 40 cm 42 cm  40 cm         AROM/PROM       Joint: Eval Date:  Re-Assess Date:10/25/18    Active ROM RIGHT LEFT RIGHT LEFT   hip flexion        Hip ER/IR       Knee flexion  108 122 120 122   Knee extension  -2 0 0 0   Ankle Dorsiflexion                       Strength:     Eval Date:  Re-Assess Date:10/25/18      RIGHT LEFT RIGHT LEFT    Hip flexion 4+/5 4+/5 4+/5 4+/5   Hip abd 4+/5 4+/5 4+/5 4+/5   Knee Flexion 4/5 4+/5 4/5 4+/5   Knee Extension 4/5 4+/5 4+/5  4+/5   Ankle DF 4/5 4/5 4+/5 4+/5   Ankle PF 4+/5 4+/5 4+/5 4+/5                                   Functional Mobility:    · Transfers:sit to stand with no use of UE, good control    · Gait: mildly decreased stance phase of right LE, able to correct with cuing.   No assistive device 100% time   · Stairs: reciprocal pattern with mild use of hand rails for balance       Balance: · Decreased though right LE       Tool Used: Lower Extremity Functional Scale (LEFS)  Score:  Initial: 40/80 Most Recent: 67/80 (Date: 10/25/18 )   Interpretation of Score: 20 questions each scored on a 5 point scale with 0 representing \"extreme difficulty or unable to perform\" and 4 representing \"no difficulty\". The lower the score, the greater the functional disability. 80/80 represents no disability. Minimal detectable change is 9 points. Score 80 79-63 62-48 47-32 31-16 15-1 0   Modifier CH CI CJ CK CL CM CN     ? Mobility - Walking and Moving Around:     - CURRENT STATUS: CI - 1%-19% impaired, limited or restricted    - GOAL STATUS: CJ - 20%-39% impaired, limited or restricted    - D/C STATUS:  ---------------To be determined---------------    Medical Necessity:   · Patient is expected to demonstrate progress in strength, range of motion, balance and coordination to increase independence with ADLs and community/social activities. Reason for Services/Other Comments:  · Patient continues to require modification of therapeutic interventions to increase complexity of exercises. TREATMENT:   (In addition to Assessment/Re-Assessment sessions the following treatments were rendered)    Pre-treatment Symptoms: pt reports that she has had increased pain of lateral knee since last session and was unable to go to [place] last night because of it. Arrived heavily limping with right hip in ER. · Pain: Initial:    8/10  Post Session:  5/10     THERAPEUTIC EXERCISE: (15 minutes):  Exercises per grid below to improve mobility, strength and balance. Required minimal visual and verbal cues to promote proper body alignment, promote proper body posture and promote proper body mechanics. Progressed resistance, range, repetitions and complexity of movement as indicated.      Date:  10/8/18 Date:  10/15/18 Date:  10/18/18 10/23/18 10/25/18 10/29/18 10/30/18 11/1/18   Activity/Exercise Parameters Parameters Parameters        Education  POC    Importance of HEP, edema control, gait       SLR X 5, 3 sec  X 10, 5 sec          QS X 5  With heel slide          Heel slide  X 5 (with 3 intervals of push) x 10 sec  X 5 R (with 3 intervals of push) x 10 sec    X 5 with sustained hold x 20 sec   With manual op X 2   nustep   8 min level  10 min level  12 min level 6 10 min level 6 10 min level 6  10 min level 6  10 min level 4    clamshell  X 10 R         SAQ  X 10 R      X 20    Side stepping    2 x 50 ft     In ladder x 4 laps     Step taps    X 20 B         Gait     See Neuro re-ed   Fwd in ladder  X 30 ft focusing in hip alignment and symmetrical stance phase    LAQ     X 10 B with DF        Figure 4 seated hip stretch      Demo do x 3 B 20 sec HEP      Prone knee flexion      X 10 right with manual resist to return to extension       gastroc stretch      B 3 x 20 sec        Neuromuscular Re-education (  min):  Exercise/activities below to improve balance, coordination, kinesthetic sense and posture. Required moderate visual, verbal, manual and tactile cues to promote static and dynamic balance in standing. · bkwd stepping in // Bars x 5 min with moderate cuing  · Gait 3 x 200 ft focus on increasing stance phase through right LE   · Ant/post rocking on blue foam x 2 min with equal weight bearing      Med[employer] Portal    MANUAL THERAPY: (30 minutes): Joint mobilization, Soft tissue mobilization and Manipulation was utilized and necessary because of the patient's restricted joint motion and painful spasm.     · Patellar mobilization all planes 5 x 10 sec each, right knee   · Lymph drainage technique R knee   · Lateral gastroc STM   · prox tib fib mob all planes    · Supine knee flexion mob grad III  · KT tape applied for edema control, 2 strips with \" 5 fingers\" each, post-lateral lower leg     MODALITIES: ( 10 minutes):   · vasopneumatic compression using Game ready moderate compression, 34 degrees    Treatment/Session Assessment: pt very tender of right knee and lower leg today. Focused treatment on pain relieve and edema control with non weight bearing exercise, taping technique, and modalities. Pt with decreased pain and edema end of session and gait much improved with cuing to decrease ER and increase weight bearing through right LE. ·      ·   Compliance with Program/Exercises: Will assess as treatment progresses. · Recommendations/Intent for next treatment session: \"Next visit will focus on advancements to more challenging activities and reduction in assistance provided\".     Future Appointments   Date Time Provider Sonia Martell   11/5/2018  8:45 AM Josafta Sandoval, PT [facility]   11/8/2018  8:45 AM Josafat Sandoval PT [facility]   11/12/2018  8:45 AM Josafat Sandoval PT [facility]   11/15/2018  8:45 AM Son Bella, PT [facility]       Total Treatment Duration: 55 min  PT Patient Time In/Time Out  Time In: 0845  Time Out: Anjali Vasquez, PT

## 2018-11-05 ENCOUNTER — HOSPITAL ENCOUNTER (OUTPATIENT)
Dept: PHYSICAL THERAPY | Age: 78
Discharge: HOME | End: 2018-11-05
Payer: MEDICARE

## 2018-11-05 PROCEDURE — 97140 MANUAL THERAPY 1/> REGIONS: CPT

## 2018-11-05 PROCEDURE — 97110 THERAPEUTIC EXERCISES: CPT

## 2018-11-05 PROCEDURE — 97016 VASOPNEUMATIC DEVICE THERAPY: CPT

## 2018-11-05 NOTE — PROGRESS NOTES
Sarah Oviedo  : 1940      [place] / [place] / Product Type: Medicare /  Roxanne Pack at [address] [place] [address]. [facility], [address], [facility], [address] [place] [address]  [phone]   [phone]        OUTPATIENT PHYSICAL THERAPY: Daily Note 2018      ICD-10: Treatment Diagnosis: Pain in Right Knee (M25.561)  Stiffness of Right Knee, Not elsewhere classified (M25.661)  Difficulty in walking, Not elsewhere classified (R26.2)  effusion right knee joint (M25.461)    Precautions/Allergies:   Sulfa (sulfonamide antibiotics) and Tape [adhesive]   Fall Risk Score: 1 (? 5 = High Risk)  MD Orders: Eval and Treat  MEDICAL/REFERRING DIAGNOSIS:  Presence of right artificial knee joint   DATE OF ONSET: 18  REFERRING PHYSICIAN: Flor Moore MD  RETURN PHYSICIAN APPOINTMENT: TBD by patient     Current ASSESSMENT:  Ms. Sarah Oviedo has attended 5 out patient physical therapy sessions and is currently 9 weeks s/p RTKA. Patient demonstrates full ROM and functional strength, is no longer using assistive device for walking and is independent and safe with all transfers and stair ambulation. She does however, continue to report significant discomfort and moderate edema of knee joint along with lateral calf pain most recently. ( No signs of DVT noted - assessed today during session) This pain has been decreasing ability to sleep and perform full HEP. Pt also still requires cuing for gait symmetry during sessions and does report edema and pain relief with manual techniques and modalities applied but seems temporary. I have encouraged pt to discuss pain with MD at upcoming appointment as I do not want this to become a barrier for reaching goals and full functional mobility. I am recommending continue PT for 4-6 more weeks at this time. PROBLEM LIST (Impacting functional limitations):  1. Decreased Strength  2.  Decreased ADL/Functional Activities  3. Decreased Transfer Abilities  4. Decreased Ambulation Ability/Technique  5. Decreased Balance  6. Increased Pain  7. Decreased Activity Tolerance  8. Decreased [place] with Home Exercise Program INTERVENTIONS PLANNED:  1. Balance Exercise  2. Bed Mobility  3. Cold  4. Cryotherapy  5. Family Education  6. Gait Training  7. Heat  8. Home Exercise Program (HEP)  9. Manual Therapy  10. Neuromuscular Re-education/Strengthening  11. Range of Motion (ROM)  12. Therapeutic Activites  13. Therapeutic Exercise/Strengthening  14. Transfer Training  15. Ultrasound (US)     TREATMENT PLAN:  Effective Dates: 10/8/2018 TO 1/6/2019 (90 days). Frequency/Duration: 2 times a week for 90 Days    GOALS: (Goals have been discussed and agreed upon with patient.)  SHORT-TERM FUNCTIONAL GOALS: Time Frame: 2-4 weeks   1. Pt will be independent with HEP. (met but not consistent at this time due to pain levels)  2. Pt will report R knee pain in no longer constant. (ongoing)  3. Pt will demonstrate mid patellar girth measurements within 1 cm bilaterally. (ongoing)  4. Pt will ambulate safely on all surfaces without use of cane reporting no tripping, falling, LOB at least 2 weeks. (met)  DISCHARGE GOALS: Time Frame: 6-8 weeks   1. Pt will improve LEFS score by at least 10 points. (met)  2. Pt will sit to stand TF 5/5 times without use of UE. (met)  3. Pt will ambulate stairs with recip gait pattern, ascending and descending no hand rail safely (ongoing)   4. Pt will demonstrate improved strength of right LE to at least 4+/5 without pain during testing. (ongoing)  REHABILITATION POTENTIAL FOR STATED GOALS: Good                HISTORY:   History of Present Injury/Illness (Reason for Referral):  Pt states right knee pain since March 2018, conservative treatments failed leading to R TKA on 8/23/18. In hospital 2 nights, [facility] for 3 weeks then HHPT 2 weeks. Continues to perform HEP independently currently.    Pt reports doing well overall at this point, gait steadily improving but using sc for community distances, no assist in home. Driving independently. Chief c/o's pain and swelling. Current symptoms:  Constant anterior knee pain and scar sensitive, weakness B LE, edema     · Aggravating factors: disrupting sleep, stairs, knee extension, sitting prolonged     · Relieving factors:  Tylenol, ice, tramadol    Pain level:  5/10 presently, 5/10 worst, 3/10 best     Past Medical History/Comorbidities:   Ms. Emerson Shultz  has a past medical history of Atrial septal aneurysm, CAD (coronary artery disease), Dyslipidemia, Heart murmur, History of breast cancer, Hypertension, Osteoarthritis, Paroxysmal atrial fibrillation ([address] Patent foramen ovale, and Valvular heart disease. Ms. Emerson Shultz  has a past surgical history that includes hx mastectomy (Left, ); hx hysterectomy (); hx  section; hx cholecystectomy (); hx cataract removal (Bilateral); hx back surgery; hx rotator cuff repair (Left, ); hx other surgical (); RIGHT KNEE ARTHROPLASTY TOTAL/DEPUY (Right, 2018); LEFT KNEE ARTHROPLASTY TOTAL (Left, 2015); and CHOLECYSTECTOMY LAPAROSCOPIC POSS OPEN (N/A, 3/13/2014).     Active Ambulatory Problems     Diagnosis Date Noted    HTN (hypertension) 2014    Personal history of breast cancer 2014    Hyperlipidemia 2014    Atrial fibrillation ([facility] [address] 2014    Acute cholecystitis 2014    Osteoarthritis 2015    S/P total knee arthroplasty 2015    Perineal abscess 2016    Infection of perineal wound 2016    S/P total knee arthroplasty, right 2018     Resolved Ambulatory Problems     Diagnosis Date Noted    No Resolved Ambulatory Problems     Past Medical History:   Diagnosis Date    Atrial septal aneurysm     CAD (coronary artery disease)     Dyslipidemia     Heart murmur     History of breast cancer     Hypertension     Osteoarthritis     Paroxysmal atrial fibrillation (HCC)     Patent foramen ovale     Valvular heart disease      Social History/Living Environment:        Social History     Socioeconomic History    Marital status:      Spouse name: Not on file    Number of children: Not on file    Years of education: Not on file    Highest education level: Not on file   Social Needs    Financial resource strain: Not on file    Food insecurity - worry: Not on file    Food insecurity - inability: Not on file   [employer] needs - medical: Not on file   [employer] needs - non-medical: Not on file   Occupational History    Not on file   Tobacco Use    Smoking status: Never Smoker    Smokeless tobacco: Never Used   Substance and Sexual Activity    Alcohol use: No    Drug use: No    Sexual activity: Not on file   Other Topics Concern    Not on file   Social History Narrative    Not on file     Prior Level of Function/Work/Activity:  Retired   Dominant Side:         LEFT  Current Medications:    Current Outpatient Medications:     hydroCHLOROthiazide (HYDRODIURIL) 25 mg tablet, Take 25 mg by mouth daily. , Disp: , Rfl:     amiodarone (CORDARONE) 200 mg tablet, Take 200 mg by mouth daily. Indications: VENTRICULAR RATE CONTROL IN ATRIAL FIBRILLATION, Disp: , Rfl:     traMADol (ULTRAM) 50 mg tablet, Take 50 mg by mouth every six (6) hours as needed for Pain. pt may take 1-2 tabs as needed for pain, Disp: , Rfl:     methocarbamol (ROBAXIN) 750 mg tablet, Take 750 mg by mouth four (4) times daily as needed (muscle relaxer). as needed for muscle spasms, Disp: , Rfl:     apixaban (ELIQUIS) 5 mg tablet, Take 5 mg by mouth every twelve (12) hours. , Disp: , Rfl:     celecoxib (CELEBREX) 200 mg capsule, Take 200 mg by mouth two (2) times a day., Disp: , Rfl:     warfarin (COUMADIN) 2 mg tablet, Take 2 mg by mouth daily. , Disp: , Rfl:     senna (SENOKOT) 8.6 mg tablet, Take 2 Tabs by mouth daily. , Disp: , Rfl:     oxyCODONE (OXYIR) 5 mg capsule, Take 5 mg by mouth every four (4) hours as needed for Pain., Disp: , Rfl:     magnesium 250 mg tab, Take 1 Tab by mouth nightly., Disp: , Rfl:     multivitamin (ONE A DAY) tablet, Take 1 Tab by mouth daily. , Disp: , Rfl:     acetaminophen (TYLENOL EXTRA STRENGTH) 500 mg tablet, Take 500 mg by mouth every six (6) hours as needed for Pain., Disp: , Rfl:     calcium-cholecalciferol, d3, 600-125 mg-unit tab, Take 1 Tab by mouth daily. , Disp: , Rfl:     fenofibrate (TRICOR) 160 mg tablet, Take 160 mg by mouth nightly., Disp: , Rfl:     rosuvastatin (CRESTOR) 10 mg tablet, Take 10 mg by mouth nightly., Disp: , Rfl:     benazepril (LOTENSIN) 20 mg tablet, Take 20 mg by mouth daily. Indications: HYPERTENSION, Disp: , Rfl:      Date Last Reviewed:  11/5/2018   EXAMINATION: updated 10/25/18   Observation/Orthostatic Postural Assessment:    Decreased weight bearing through right LE    Palpation:    Tender of lateral left calf      Girth Measurement:   Joint: Eval Date: 10/8/18  Re-Assess Date: 10/25/18    Mid patellar  Measurement RIGHT LEFT RIGHT LEFT    43 cm 40 cm 42 cm  40 cm         AROM/PROM       Joint: Eval Date:  Re-Assess Date:10/25/18    Active ROM RIGHT LEFT RIGHT LEFT   hip flexion        Hip ER/IR       Knee flexion  108 122 120 122   Knee extension  -2 0 0 0   Ankle Dorsiflexion                       Strength:     Eval Date:  Re-Assess Date:10/25/18      RIGHT LEFT RIGHT LEFT    Hip flexion 4+/5 4+/5 4+/5 4+/5   Hip abd 4+/5 4+/5 4+/5 4+/5   Knee Flexion 4/5 4+/5 4/5 4+/5   Knee Extension 4/5 4+/5 4+/5  4+/5   Ankle DF 4/5 4/5 4+/5 4+/5   Ankle PF 4+/5 4+/5 4+/5 4+/5                                   Functional Mobility:    · Transfers:sit to stand with no use of UE, good control    · Gait: mildly decreased stance phase of right LE, able to correct with cuing.   No assistive device 100% time   · Stairs: reciprocal pattern with mild use of hand rails for balance       Balance: · Decreased though right LE       Tool Used: Lower Extremity Functional Scale (LEFS)  Score:  Initial: 40/80 Most Recent: 67/80 (Date: 10/25/18 )   Interpretation of Score: 20 questions each scored on a 5 point scale with 0 representing \"extreme difficulty or unable to perform\" and 4 representing \"no difficulty\". The lower the score, the greater the functional disability. 80/80 represents no disability. Minimal detectable change is 9 points. Score 80 79-63 62-48 47-32 31-16 15-1 0   Modifier CH CI CJ CK CL CM CN     ? Mobility - Walking and Moving Around:     - CURRENT STATUS: CI - 1%-19% impaired, limited or restricted    - GOAL STATUS: CJ - 20%-39% impaired, limited or restricted    - D/C STATUS:  ---------------To be determined---------------    Medical Necessity:   · Patient is expected to demonstrate progress in strength, range of motion, balance and coordination to increase independence with ADLs and community/social activities. Reason for Services/Other Comments:  · Patient continues to require modification of therapeutic interventions to increase complexity of exercises. TREATMENT:   (In addition to Assessment/Re-Assessment sessions the following treatments were rendered)    Pre-treatment Symptoms: pt states that she continues to have lateral knee pain with no relief over last 2 week and says that she feels unable to do much at session today. · Pain: Initial:    6-7/10 antalgic gait  Post Session:  5-6/10 improved gait      THERAPEUTIC EXERCISE: (15 minutes):  Exercises per grid below to improve mobility, strength and balance. Required minimal visual and verbal cues to promote proper body alignment, promote proper body posture and promote proper body mechanics. Progressed resistance, range, repetitions and complexity of movement as indicated.      Date:  10/8/18 Date:  10/15/18 Date:  10/18/18 10/23/18 10/25/18 10/29/18 10/30/18 11/1/18 11/5/18   Activity/Exercise Parameters Parameters Parameters         Education  POC    Importance of HEP, edema control, gait        SLR X 5, 3 sec  X 10, 5 sec           QS X 5  With heel slide           Heel slide  X 5 (with 3 intervals of push) x 10 sec  X 5 R (with 3 intervals of push) x 10 sec    X 5 with sustained hold x 20 sec   With manual op X 2    nustep   8 min level  10 min level  12 min level 6 10 min level 6 10 min level 6  10 min level 6  10 min level 4  10 min level 4    clamshell  X 10 R          SAQ  X 10 R      X 20     Side stepping    2 x 50 ft     In ladder x 4 laps      Step taps    X 20 B          Gait     See Neuro re-ed   Fwd in ladder  X 30 ft focusing in hip alignment and symmetrical stance phase X 20 ft focusing on symmetry     LAQ     X 10 B with DF      X 10    Figure 4 seated hip stretch      Demo do x 3 B 20 sec HEP       Prone knee flexion      X 10 right with manual resist to return to extension        gastroc stretch      B 3 x 20 sec         Neuromuscular Re-education (  min):  Exercise/activities below to improve balance, coordination, kinesthetic sense and posture. Required moderate visual, verbal, manual and tactile cues to promote static and dynamic balance in standing. ·       [employer] [facility]    MANUAL THERAPY: (25 minutes): Joint mobilization, Soft tissue mobilization and Manipulation was utilized and necessary because of the patient's restricted joint motion and painful spasm. · Patellar mobilization all planes 5 x 10 sec each, right knee   · Combined right ankle DF and AP mob of TC joint   · Lateral gastroc STM   · prox and distal tib fib mob all planes    · KT tape applied for support of right lateral knee and lower leg structures     MODALITIES: ( 10 minutes):   · vasopneumatic compression using Game ready moderate compression, 34 degrees    Treatment/Session Assessment: pt very tender of right lateral TF joint at knee. Min to mod swelling lower leg.   Pt arrived ER right hip and decreasing stance phase through right. After discussion with pt, pt admits to majority of walking being antalgic and hip in ER and states that occasionally, she corrects foot/hip position but takes a lot of effort to do so. I have recommended pt to focus majority of time on improving gait a little at a time with increased stance phase and mild altering or rotation. Less pain and swelling with improved gait at end of session. ·      ·   Compliance with Program/Exercises: Will assess as treatment progresses. · Recommendations/Intent for next treatment session: \"Next visit will focus on advancements to more challenging activities and reduction in assistance provided\".     Future Appointments   Date Time Provider Sonia Martell   11/8/2018  8:45 AM Ed Beckford, PT [facility]   11/12/2018  8:45 AM Ed Beckford, PT [facility]   11/15/2018  8:45 AM Carren Landau, Sigurd Spice, PT [facility]       Total Treatment Duration: 50 min  PT Patient Time In/Time Out  Time In: 0850  Time Out: Evelyn Winchester PT

## 2018-11-08 ENCOUNTER — HOSPITAL ENCOUNTER (OUTPATIENT)
Dept: PHYSICAL THERAPY | Age: 78
Discharge: HOME | End: 2018-11-08
Payer: MEDICARE

## 2018-11-08 PROCEDURE — 97016 VASOPNEUMATIC DEVICE THERAPY: CPT

## 2018-11-08 PROCEDURE — 97140 MANUAL THERAPY 1/> REGIONS: CPT

## 2018-11-08 PROCEDURE — 97110 THERAPEUTIC EXERCISES: CPT

## 2018-11-08 NOTE — PROGRESS NOTES
Samir Britt  : 1940      [place] / [place] PART [place] B / Product Type: Medicare /  [employer] [address]. [facility], [address], [facility], [address] [place] [address]  [phone]   [phone]        OUTPATIENT PHYSICAL THERAPY: Daily Note 2018      ICD-10: Treatment Diagnosis: Pain in Right Knee (M25.561)  Stiffness of Right Knee, Not elsewhere classified (M25.661)  Difficulty in walking, Not elsewhere classified (R26.2)  effusion right knee joint (M25.461)    Precautions/Allergies:   Sulfa (sulfonamide antibiotics) and Tape [adhesive]   Fall Risk Score: 1 (? 5 = High Risk)  MD Orders: Eval and Treat  MEDICAL/REFERRING DIAGNOSIS:  Presence of right artificial knee joint   DATE OF ONSET: 18  REFERRING PHYSICIAN: Ezio Celestin MD  RETURN PHYSICIAN APPOINTMENT: TBD by patient     Current ASSESSMENT:  Ms. Samir Britt has attended 5 out patient physical therapy sessions and is currently 9 weeks s/p RTKA. Patient demonstrates full ROM and functional strength, is no longer using assistive device for walking and is independent and safe with all transfers and stair ambulation. She does however, continue to report significant discomfort and moderate edema of knee joint along with lateral calf pain most recently. ( No signs of DVT noted - assessed today during session) This pain has been decreasing ability to sleep and perform full HEP. Pt also still requires cuing for gait symmetry during sessions and does report edema and pain relief with manual techniques and modalities applied but seems temporary. I have encouraged pt to discuss pain with MD at upcoming appointment as I do not want this to become a barrier for reaching goals and full functional mobility. I am recommending continue PT for 4-6 more weeks at this time. PROBLEM LIST (Impacting functional limitations):  1. Decreased Strength  2.  Decreased ADL/Functional Activities  3. Decreased Transfer Abilities  4. Decreased Ambulation Ability/Technique  5. Decreased Balance  6. Increased Pain  7. Decreased Activity Tolerance  8. Decreased [place] with Home Exercise Program INTERVENTIONS PLANNED:  1. Balance Exercise  2. Bed Mobility  3. Cold  4. Cryotherapy  5. Family Education  6. Gait Training  7. Heat  8. Home Exercise Program (HEP)  9. Manual Therapy  10. Neuromuscular Re-education/Strengthening  11. Range of Motion (ROM)  12. Therapeutic Activites  13. Therapeutic Exercise/Strengthening  14. Transfer Training  15. Ultrasound (US)     TREATMENT PLAN:  Effective Dates: 10/8/2018 TO 1/6/2019 (90 days). Frequency/Duration: 2 times a week for 90 Days    GOALS: (Goals have been discussed and agreed upon with patient.)  SHORT-TERM FUNCTIONAL GOALS: Time Frame: 2-4 weeks   1. Pt will be independent with HEP. (met but not consistent at this time due to pain levels)  2. Pt will report R knee pain in no longer constant. (ongoing)  3. Pt will demonstrate mid patellar girth measurements within 1 cm bilaterally. (ongoing)  4. Pt will ambulate safely on all surfaces without use of cane reporting no tripping, falling, LOB at least 2 weeks. (met)  DISCHARGE GOALS: Time Frame: 6-8 weeks   1. Pt will improve LEFS score by at least 10 points. (met)  2. Pt will sit to stand TF 5/5 times without use of UE. (met)  3. Pt will ambulate stairs with recip gait pattern, ascending and descending no hand rail safely (ongoing)   4. Pt will demonstrate improved strength of right LE to at least 4+/5 without pain during testing. (ongoing)  REHABILITATION POTENTIAL FOR STATED GOALS: Good                HISTORY:   History of Present Injury/Illness (Reason for Referral):  Pt states right knee pain since March 2018, conservative treatments failed leading to R TKA on 8/23/18. In hospital 2 nights, [facility] for 3 weeks then HHPT 2 weeks. Continues to perform HEP independently currently.    Pt reports doing well overall at this point, gait steadily improving but using sc for community distances, no assist in home. Driving independently. Chief c/o's pain and swelling. Current symptoms:  Constant anterior knee pain and scar sensitive, weakness B LE, edema     · Aggravating factors: disrupting sleep, stairs, knee extension, sitting prolonged     · Relieving factors:  Tylenol, ice, tramadol    Pain level:  5/10 presently, 5/10 worst, 3/10 best     Past Medical History/Comorbidities:   Ms. Rhonda Powers  has a past medical history of Atrial septal aneurysm, CAD (coronary artery disease), Dyslipidemia, Heart murmur, History of breast cancer, Hypertension, Osteoarthritis, Paroxysmal atrial fibrillation ([address] Patent foramen ovale, and Valvular heart disease. Ms. Rhonda Powers  has a past surgical history that includes hx mastectomy (Left, ); hx hysterectomy (); hx  section; hx cholecystectomy (); hx cataract removal (Bilateral); hx back surgery; hx rotator cuff repair (Left, ); hx other surgical (); RIGHT KNEE ARTHROPLASTY TOTAL/DEPUY (Right, 2018); LEFT KNEE ARTHROPLASTY TOTAL (Left, 2015); and CHOLECYSTECTOMY LAPAROSCOPIC POSS OPEN (N/A, 3/13/2014).     Active Ambulatory Problems     Diagnosis Date Noted    HTN (hypertension) 2014    Personal history of breast cancer 2014    Hyperlipidemia 2014    Atrial fibrillation ([facility] [address] 2014    Acute cholecystitis 2014    Osteoarthritis 2015    S/P total knee arthroplasty 2015    Perineal abscess 2016    Infection of perineal wound 2016    S/P total knee arthroplasty, right 2018     Resolved Ambulatory Problems     Diagnosis Date Noted    No Resolved Ambulatory Problems     Past Medical History:   Diagnosis Date    Atrial septal aneurysm     CAD (coronary artery disease)     Dyslipidemia     Heart murmur     History of breast cancer     Hypertension     Osteoarthritis     Paroxysmal atrial fibrillation (HCC)     Patent foramen ovale     Valvular heart disease      Social History/Living Environment:        Social History     Socioeconomic History    Marital status:      Spouse name: Not on file    Number of children: Not on file    Years of education: Not on file    Highest education level: Not on file   Social Needs    Financial resource strain: Not on file    Food insecurity - worry: Not on file    Food insecurity - inability: Not on file   [employer] needs - medical: Not on file   [employer] needs - non-medical: Not on file   Occupational History    Not on file   Tobacco Use    Smoking status: Never Smoker    Smokeless tobacco: Never Used   Substance and Sexual Activity    Alcohol use: No    Drug use: No    Sexual activity: Not on file   Other Topics Concern    Not on file   Social History Narrative    Not on file     Prior Level of Function/Work/Activity:  Retired   Dominant Side:         LEFT  Current Medications:    Current Outpatient Medications:     hydroCHLOROthiazide (HYDRODIURIL) 25 mg tablet, Take 25 mg by mouth daily. , Disp: , Rfl:     amiodarone (CORDARONE) 200 mg tablet, Take 200 mg by mouth daily. Indications: VENTRICULAR RATE CONTROL IN ATRIAL FIBRILLATION, Disp: , Rfl:     traMADol (ULTRAM) 50 mg tablet, Take 50 mg by mouth every six (6) hours as needed for Pain. pt may take 1-2 tabs as needed for pain, Disp: , Rfl:     methocarbamol (ROBAXIN) 750 mg tablet, Take 750 mg by mouth four (4) times daily as needed (muscle relaxer). as needed for muscle spasms, Disp: , Rfl:     apixaban (ELIQUIS) 5 mg tablet, Take 5 mg by mouth every twelve (12) hours. , Disp: , Rfl:     celecoxib (CELEBREX) 200 mg capsule, Take 200 mg by mouth two (2) times a day., Disp: , Rfl:     warfarin (COUMADIN) 2 mg tablet, Take 2 mg by mouth daily. , Disp: , Rfl:     senna (SENOKOT) 8.6 mg tablet, Take 2 Tabs by mouth daily. , Disp: , Rfl:     oxyCODONE (OXYIR) 5 mg capsule, Take 5 mg by mouth every four (4) hours as needed for Pain., Disp: , Rfl:     magnesium 250 mg tab, Take 1 Tab by mouth nightly., Disp: , Rfl:     multivitamin (ONE A DAY) tablet, Take 1 Tab by mouth daily. , Disp: , Rfl:     acetaminophen (TYLENOL EXTRA STRENGTH) 500 mg tablet, Take 500 mg by mouth every six (6) hours as needed for Pain., Disp: , Rfl:     calcium-cholecalciferol, d3, 600-125 mg-unit tab, Take 1 Tab by mouth daily. , Disp: , Rfl:     fenofibrate (TRICOR) 160 mg tablet, Take 160 mg by mouth nightly., Disp: , Rfl:     rosuvastatin (CRESTOR) 10 mg tablet, Take 10 mg by mouth nightly., Disp: , Rfl:     benazepril (LOTENSIN) 20 mg tablet, Take 20 mg by mouth daily. Indications: HYPERTENSION, Disp: , Rfl:      Date Last Reviewed:  11/8/2018   EXAMINATION: updated 10/25/18   Observation/Orthostatic Postural Assessment:    Decreased weight bearing through right LE    Palpation:    Tender of lateral left calf      Girth Measurement:   Joint: Eval Date: 10/8/18  Re-Assess Date: 10/25/18    Mid patellar  Measurement RIGHT LEFT RIGHT LEFT    43 cm 40 cm 42 cm  40 cm         AROM/PROM       Joint: Eval Date:  Re-Assess Date:10/25/18    Active ROM RIGHT LEFT RIGHT LEFT   hip flexion        Hip ER/IR       Knee flexion  108 122 120 122   Knee extension  -2 0 0 0   Ankle Dorsiflexion                       Strength:     Eval Date:  Re-Assess Date:10/25/18      RIGHT LEFT RIGHT LEFT    Hip flexion 4+/5 4+/5 4+/5 4+/5   Hip abd 4+/5 4+/5 4+/5 4+/5   Knee Flexion 4/5 4+/5 4/5 4+/5   Knee Extension 4/5 4+/5 4+/5  4+/5   Ankle DF 4/5 4/5 4+/5 4+/5   Ankle PF 4+/5 4+/5 4+/5 4+/5                                   Functional Mobility:    · Transfers:sit to stand with no use of UE, good control    · Gait: mildly decreased stance phase of right LE, able to correct with cuing.   No assistive device 100% time   · Stairs: reciprocal pattern with mild use of hand rails for balance       Balance: · Decreased though right LE       Tool Used: Lower Extremity Functional Scale (LEFS)  Score:  Initial: 40/80 Most Recent: 67/80 (Date: 10/25/18 )   Interpretation of Score: 20 questions each scored on a 5 point scale with 0 representing \"extreme difficulty or unable to perform\" and 4 representing \"no difficulty\". The lower the score, the greater the functional disability. 80/80 represents no disability. Minimal detectable change is 9 points. Score 80 79-63 62-48 47-32 31-16 15-1 0   Modifier CH CI CJ CK CL CM CN     ? Mobility - Walking and Moving Around:     - CURRENT STATUS: CI - 1%-19% impaired, limited or restricted    - GOAL STATUS: CJ - 20%-39% impaired, limited or restricted    - D/C STATUS:  ---------------To be determined---------------    Medical Necessity:   · Patient is expected to demonstrate progress in strength, range of motion, balance and coordination to increase independence with ADLs and community/social activities. Reason for Services/Other Comments:  · Patient continues to require modification of therapeutic interventions to increase complexity of exercises. TREATMENT:   (In addition to Assessment/Re-Assessment sessions the following treatments were rendered)    Pre-treatment Symptoms: pt states that she has been able to walk better/easier since last session, as evident upon arrival.  Less knee pain and more localized but still present with weight bearing. Pt wondering if she is close to being discharged. · Pain: Initial:    5/10 Post Session: 5/10     THERAPEUTIC EXERCISE: (35 minutes):  Exercises per grid below to improve mobility, strength and balance. Required minimal visual and verbal cues to promote proper body alignment, promote proper body posture and promote proper body mechanics. Progressed resistance, range, repetitions and complexity of movement as indicated.      Date:  10/8/18 Date:  10/15/18 Date:  10/18/18 10/23/18 10/25/18 10/29/18 10/30/18 11/1/18 11/5/18 11/8/18   Activity/Exercise Parameters Parameters Parameters          Education  POC    Importance of HEP, edema control, gait         SLR X 5, 3 sec  X 10, 5 sec            QS X 5  With heel slide            Heel slide  X 5 (with 3 intervals of push) x 10 sec  X 5 R (with 3 intervals of push) x 10 sec    X 5 with sustained hold x 20 sec   With manual op X 2     nustep   8 min level  10 min level  12 min level 6 10 min level 6 10 min level 6  10 min level 6  10 min level 4  10 min level 4  10 min level 4   clamshell  X 10 R        X 12 right    SAQ  X 10 R      X 20      Slow walk with high step          In ladder to focus on SLS and transition of weight    X 4 laps   Side stepping    2 x 50 ft     In ladder x 4 laps    In ladder x 6 laps    Step taps    X 20 B           Gait     See Neuro re-ed   Fwd in ladder  X 30 ft focusing in hip alignment and symmetrical stance phase X 20 ft focusing on symmetry  X 30 ft     LAQ     X 10 B with DF      X 10     Figure 4 seated hip stretch      Demo do x 3 B 20 sec HEP        Prone knee flexion      X 10 right with manual resist to return to extension         gastroc stretch      B 3 x 20 sec          Neuromuscular Re-education (  min):  Exercise/activities below to improve balance, coordination, kinesthetic sense and posture. Required moderate visual, verbal, manual and tactile cues to promote static and dynamic balance in standing. ·       [employer]    MANUAL THERAPY: (10 minutes): Joint mobilization, Soft tissue mobilization and Manipulation was utilized and necessary because of the patient's restricted joint motion and painful spasm.     · Patellar mobilization all planes 5 x 10 sec each, right knee   · Combined right ankle DF and AP mob of TC joint   · Lateral gastroc STM   · prox and distal tib fib mob all planes    · KT tape applied for support of right lateral knee and lower leg structures, \"y\" strip      MODALITIES: ( 10 minutes):   · vasopneumatic compression using Game ready moderate compression, 34 degrees    Treatment/Session Assessment: pt with overall less pain today. Still demonstrates decreased hip activation with right SLS. Somewhat resistant to practice SLS due to knee pain and mental and physical challenge of body mechanics. Gait also much improved today upon arrival and departure. Min to mod knee pain throughout session today with weight bearing. ·      ·   Compliance with Program/Exercises: Will assess as treatment progresses. · Recommendations/Intent for next treatment session: \"Next visit will focus on advancements to more challenging activities and reduction in assistance provided\".     Future Appointments   Date Time Provider Sonia Martell   11/12/2018  8:45 AM Leila Patton, PT [facility]   11/15/2018  8:45 AM Praveen Benson, PT [facility]       Total Treatment Duration: 55 min  PT Patient Time In/Time Out  Time In: 0845  Time Out: Anjali Vasquez, PT

## 2018-11-12 ENCOUNTER — HOSPITAL ENCOUNTER (OUTPATIENT)
Dept: PHYSICAL THERAPY | Age: 78
Discharge: HOME | End: 2018-11-12
Payer: MEDICARE

## 2018-11-12 PROCEDURE — G8979 MOBILITY GOAL STATUS: HCPCS

## 2018-11-12 PROCEDURE — G8980 MOBILITY D/C STATUS: HCPCS

## 2018-11-12 PROCEDURE — 97140 MANUAL THERAPY 1/> REGIONS: CPT

## 2018-11-12 PROCEDURE — 97110 THERAPEUTIC EXERCISES: CPT

## 2018-11-12 NOTE — THERAPY DISCHARGE
Rosa Moffett  : 1940      [place] / [place] / Product Type: Medicare /  [facility] [place]. [facility], [address], [facility], [address] [place] [address]  [phone]   [phone]        OUTPATIENT PHYSICAL THERAPY: Discharge summary/ Daily Note 2018      ICD-10: Treatment Diagnosis: Pain in Right Knee (M25.561)  Stiffness of Right Knee, Not elsewhere classified (M25.661)  Difficulty in walking, Not elsewhere classified (R26.2)  effusion right knee joint (M25.461)    Precautions/Allergies:   Sulfa (sulfonamide antibiotics) and Tape [adhesive]   Fall Risk Score: 1 (? 5 = High Risk)  MD Orders: Eval and Treat  MEDICAL/REFERRING DIAGNOSIS:  Presence of right artificial knee joint   DATE OF ONSET: 18  REFERRING PHYSICIAN: Kassandra Melton MD  RETURN PHYSICIAN APPOINTMENT: TBD by patient     Current ASSESSMENT:  Ms. Rosa Moffett has attended 10 out patient physical therapy sessions. Patient demonstrates full ROM and functional strength of right knee, is no longer using assistive device for walking and is independent and safe with all transfers and stair ambulation. She does however, continue to report intermittent exacerbation of discomfort lateral knee with weightbearing but gait pattern much improved. Pt reports she feels ready for discharge, despite pain, and has met all therapy goals. TREATMENT PLAN:    GOALS: (Goals have been discussed and agreed upon with patient.)  SHORT-TERM FUNCTIONAL GOALS:   1. Pt will be independent with HEP. (met but not consistent at this time due to pain levels)  2. Pt will report R knee pain in no longer constant. (ongoing)  3. Pt will demonstrate mid patellar girth measurements within 1 cm bilaterally. (ongoing)  4. Pt will ambulate safely on all surfaces without use of cane reporting no tripping, falling, LOB at least 2 weeks. (met)  DISCHARGE GOALS:   1.  Pt will improve LEFS score by at least 10 points. (met)  2. Pt will sit to stand TF 5/5 times without use of UE. (met)  3. Pt will ambulate stairs with recip gait pattern, ascending and descending no hand rail safely (ongoing)   4. Pt will demonstrate improved strength of right LE to at least 4+/5 without pain during testing. (ongoing)                  HISTORY:    History of Present Injury/Illness (Reason for Referral):  Pt states right knee pain since 2018, conservative treatments failed leading to R TKA on 18. In hospital 2 nights, [facility] for 3 weeks then HHPT 2 weeks. Continues to perform HEP independently currently. Pt reports doing well overall at this point, gait steadily improving but using sc for community distances, no assist in home. Driving independently. Chief c/o's pain and swelling. Current symptoms:  Constant anterior knee pain and scar sensitive, weakness B LE, edema     · Aggravating factors: disrupting sleep, stairs, knee extension, sitting prolonged     · Relieving factors:  Tylenol, ice, tramadol    Pain level:  5/10 presently, 5/10 worst, 3/10 best     Past Medical History/Comorbidities:   Ms. Hillary Mae  has a past medical history of Atrial septal aneurysm, CAD (coronary artery disease), Dyslipidemia, Heart murmur, History of breast cancer, Hypertension, Osteoarthritis, Paroxysmal atrial fibrillation ([address] Patent foramen ovale, and Valvular heart disease. Ms. Hillary Mae  has a past surgical history that includes hx mastectomy (Left, ); hx hysterectomy (); hx  section; hx cholecystectomy (); hx cataract removal (Bilateral); hx back surgery; hx rotator cuff repair (Left, ); hx other surgical (); RIGHT KNEE ARTHROPLASTY TOTAL/DEPUY (Right, 2018); LEFT KNEE ARTHROPLASTY TOTAL (Left, 2015); and CHOLECYSTECTOMY LAPAROSCOPIC POSS OPEN (N/A, 3/13/2014).     Active Ambulatory Problems     Diagnosis Date Noted    HTN (hypertension) 2014    Personal history of breast cancer 03/12/2014    Hyperlipidemia 03/12/2014    Atrial fibrillation ([facility] [address] 03/12/2014    Acute cholecystitis 03/12/2014    Osteoarthritis 12/01/2015    S/P total knee arthroplasty 12/02/2015    Perineal abscess 06/20/2016    Infection of perineal wound 06/20/2016    S/P total knee arthroplasty, right 08/24/2018     Resolved Ambulatory Problems     Diagnosis Date Noted    No Resolved Ambulatory Problems     Past Medical History:   Diagnosis Date    Atrial septal aneurysm     CAD (coronary artery disease)     Dyslipidemia     Heart murmur     History of breast cancer 1990    Hypertension     Osteoarthritis     Paroxysmal atrial fibrillation (HCC)     Patent foramen ovale     Valvular heart disease      Social History/Living Environment:        Social History     Socioeconomic History    Marital status:      Spouse name: Not on file    Number of children: Not on file    Years of education: Not on file    Highest education level: Not on file   Social Needs    Financial resource strain: Not on file    Food insecurity - worry: Not on file    Food insecurity - inability: Not on file   ROSTR needs - medical: Not on file   ROSTR needs - non-medical: Not on file   Occupational History    Not on file   Tobacco Use    Smoking status: Never Smoker    Smokeless tobacco: Never Used   Substance and Sexual Activity    Alcohol use: No    Drug use: No    Sexual activity: Not on file   Other Topics Concern    Not on file   Social History Narrative    Not on file     Prior Level of Function/Work/Activity:  Retired   Dominant Side:         LEFT  Current Medications:    Current Outpatient Medications:     hydroCHLOROthiazide (HYDRODIURIL) 25 mg tablet, Take 25 mg by mouth daily. , Disp: , Rfl:     amiodarone (CORDARONE) 200 mg tablet, Take 200 mg by mouth daily.  Indications: VENTRICULAR RATE CONTROL IN ATRIAL FIBRILLATION, Disp: , Rfl:     traMADol (ULTRAM) 50 mg tablet, Take 50 mg by mouth every six (6) hours as needed for Pain. pt may take 1-2 tabs as needed for pain, Disp: , Rfl:     methocarbamol (ROBAXIN) 750 mg tablet, Take 750 mg by mouth four (4) times daily as needed (muscle relaxer). as needed for muscle spasms, Disp: , Rfl:     apixaban (ELIQUIS) 5 mg tablet, Take 5 mg by mouth every twelve (12) hours. , Disp: , Rfl:     celecoxib (CELEBREX) 200 mg capsule, Take 200 mg by mouth two (2) times a day., Disp: , Rfl:     warfarin (COUMADIN) 2 mg tablet, Take 2 mg by mouth daily. , Disp: , Rfl:     senna (SENOKOT) 8.6 mg tablet, Take 2 Tabs by mouth daily. , Disp: , Rfl:     oxyCODONE (OXYIR) 5 mg capsule, Take 5 mg by mouth every four (4) hours as needed for Pain., Disp: , Rfl:     magnesium 250 mg tab, Take 1 Tab by mouth nightly., Disp: , Rfl:     multivitamin (ONE A DAY) tablet, Take 1 Tab by mouth daily. , Disp: , Rfl:     acetaminophen (TYLENOL EXTRA STRENGTH) 500 mg tablet, Take 500 mg by mouth every six (6) hours as needed for Pain., Disp: , Rfl:     calcium-cholecalciferol, d3, 600-125 mg-unit tab, Take 1 Tab by mouth daily. , Disp: , Rfl:     fenofibrate (TRICOR) 160 mg tablet, Take 160 mg by mouth nightly., Disp: , Rfl:     rosuvastatin (CRESTOR) 10 mg tablet, Take 10 mg by mouth nightly., Disp: , Rfl:     benazepril (LOTENSIN) 20 mg tablet, Take 20 mg by mouth daily.  Indications: HYPERTENSION, Disp: , Rfl:      Date Last Reviewed:  11/14/2018    EXAMINATION: updated 11/12/18              Girth Measurement:   Joint: Eval Date: 10/8/18  Re-Assess Date: 11/12/18    Mid patellar  Measurement RIGHT LEFT RIGHT LEFT    43 cm 40 cm 43 cm  42 cm         AROM/PROM       Joint: Eval Date:  Re-Assess Date:11/12/18    Active ROM RIGHT LEFT RIGHT LEFT   hip flexion        Hip ER/IR       Knee flexion  108 122 125 122   Knee extension  -2 0 0 0   Ankle Dorsiflexion                       Strength:     Eval Date:  Re-Assess Date:10/25/18      RIGHT LEFT RIGHT LEFT    Hip flexion 4+/5 4+/5 4+/5 4+/5   Hip abd 4+/5 4+/5 4+/5 4+/5   Knee Flexion 4/5 4+/5 4+/5 4+/5   Knee Extension 4/5 4+/5 4+/5  4+/5   Ankle DF 4/5 4/5 4+/5 4+/5   Ankle PF 4+/5 4+/5 4+/5 4+/5                                   Functional Mobility:    · Transfers:sit to stand with no use of UE, good control    · Gait: mildly decreased stance phase of right LE, able to correct with cuing. No assistive device 100% time   · Stairs: reciprocal pattern with mild use of hand rails for balance       Balance:    · Decreased though right LE        Tool Used: Lower Extremity Functional Scale (LEFS)  Score:  Initial: 40/80 Most Recent: 80/80 (Date: 11/12/18 )   Interpretation of Score: 20 questions each scored on a 5 point scale with 0 representing \"extreme difficulty or unable to perform\" and 4 representing \"no difficulty\". The lower the score, the greater the functional disability. 80/80 represents no disability. Minimal detectable change is 9 points. Score 80 79-63 62-48 47-32 31-16 15-1 0   Modifier CH CI CJ CK CL CM CN     ? Mobility - Walking and Moving Around:     - GOAL STATUS: CJ - 20%-39% impaired, limited or restricted    - D/C STATUS:  [address] [place] [address] - 0% impaired, limited or restricted   ·    TREATMENT: 11/12/118    (In addition to Assessment/Re-Assessment sessions the following treatments were rendered)    Pre-treatment Symptoms: pt states that she is doing well and ready for discharge. · Pain: Initial:    5/10 lateral knee Post Session: 5/10     THERAPEUTIC EXERCISE: (35 minutes):  Exercises per grid below to improve mobility, strength and balance. Required minimal visual and verbal cues to promote proper body alignment, promote proper body posture and promote proper body mechanics. Progressed resistance, range, repetitions and complexity of movement as indicated.      Date:  10/8/18 Date:  10/15/18 Date:  10/18/18 10/23/18 10/25/18 10/29/18 10/30/18 11/1/18 11/5/18 11/8/18 11/12/18   Activity/Exercise Parameters Parameters Parameters           Education  POC    Importance of HEP, edema control, gait          SLR X 5, 3 sec  X 10, 5 sec             QS X 5  With heel slide             Heel slide  X 5 (with 3 intervals of push) x 10 sec  X 5 R (with 3 intervals of push) x 10 sec    X 5 with sustained hold x 20 sec   With manual op X 2      nustep   8 min level  10 min level  12 min level 6 10 min level 6 10 min level 6  10 min level 6  10 min level 4  10 min level 4  10 min level 4 X 10 min level 5   clamshell  X 10 R        X 12 right     SAQ  X 10 R      X 20       Slow walk with high step          In ladder to focus on SLS and transition of weight    X 4 laps    Side stepping    2 x 50 ft     In ladder x 4 laps    In ladder x 6 laps     Step taps    X 20 B            Gait     See Neuro re-ed   Fwd in ladder  X 30 ft focusing in hip alignment and symmetrical stance phase X 20 ft focusing on symmetry  X 30 ft  X 200 ft     LAQ     X 10 B with DF      X 10      Figure 4 seated hip stretch      Demo do x 3 B 20 sec HEP         Prone knee flexion      X 10 right with manual resist to return to extension          gastroc stretch      B 3 x 20 sec          Sit to stand           X 10    stairs           X 4 laps                                     ·       [employer] Portal    MANUAL THERAPY: (10 minutes): Joint mobilization, Soft tissue mobilization and Manipulation was utilized and necessary because of the patient's restricted joint motion and painful spasm. · Patellar mobilization all planes 5 x 10 sec each, right knee   · prox and distal tib fib mob all planes      Treatment/Session Assessment: demonstrates and voices independence with and wiliness to continue HEP on own. Pt meeting all ROM and strength goals at this time and reports no significant functional limitations. ·   Compliance with Program/Exercises: Will assess as treatment progresses.   · Recommendations/Intent for next treatment session: \"Next visit will focus on advancements to more challenging activities and reduction in assistance provided\".     Future Appointments   Date Time Provider Sonia Jayshree   11/15/2018  8:45 AM Hank Lora, PT [facility]       Total Treatment Duration: 45 min  PT Patient Time In/Time Out  Time In: 0845  Time Out: 0930    Reed Sibley, PT

## 2018-11-15 ENCOUNTER — APPOINTMENT (OUTPATIENT)
Dept: PHYSICAL THERAPY | Age: 78
End: 2018-11-15
Payer: MEDICARE

## 2020-05-12 ENCOUNTER — APPOINTMENT (RX ONLY)
Dept: URBAN - METROPOLITAN AREA CLINIC 24 | Facility: CLINIC | Age: 80
Setting detail: DERMATOLOGY
End: 2020-05-12

## 2020-05-12 DIAGNOSIS — L40.8 OTHER PSORIASIS: ICD-10-CM

## 2020-05-12 DIAGNOSIS — L29.8 OTHER PRURITUS: ICD-10-CM

## 2020-05-12 DIAGNOSIS — Z79.01 LONG TERM (CURRENT) USE OF ANTICOAGULANTS: ICD-10-CM

## 2020-05-12 DIAGNOSIS — L29.89 OTHER PRURITUS: ICD-10-CM

## 2020-05-12 DIAGNOSIS — L82.1 OTHER SEBORRHEIC KERATOSIS: ICD-10-CM

## 2020-05-12 PROCEDURE — ? OTHER

## 2020-05-12 PROCEDURE — ? COUNSELING

## 2020-05-12 PROCEDURE — 99202 OFFICE O/P NEW SF 15 MIN: CPT

## 2020-05-12 PROCEDURE — ? PRESCRIPTION

## 2020-05-12 PROCEDURE — ? ADDITIONAL NOTES

## 2020-05-12 RX ORDER — FLUOCINONIDE 0.5 MG/ML
SOLUTION TOPICAL
Qty: 1 | Refills: 6 | Status: ERX | COMMUNITY
Start: 2020-05-12

## 2020-05-12 RX ADMIN — FLUOCINONIDE: 0.5 SOLUTION TOPICAL at 00:00

## 2020-05-12 ASSESSMENT — LOCATION SIMPLE DESCRIPTION DERM
LOCATION SIMPLE: POSTERIOR SCALP
LOCATION SIMPLE: RIGHT FOREARM
LOCATION SIMPLE: LEFT SHOULDER
LOCATION SIMPLE: LEFT PRETIBIAL REGION
LOCATION SIMPLE: RIGHT UPPER BACK
LOCATION SIMPLE: LEFT FOREHEAD
LOCATION SIMPLE: LEFT FOREARM

## 2020-05-12 ASSESSMENT — LOCATION DETAILED DESCRIPTION DERM
LOCATION DETAILED: LEFT PROXIMAL DORSAL FOREARM
LOCATION DETAILED: RIGHT PROXIMAL RADIAL DORSAL FOREARM
LOCATION DETAILED: RIGHT MID-UPPER BACK
LOCATION DETAILED: LEFT SUPERIOR FOREHEAD
LOCATION DETAILED: LEFT DISTAL PRETIBIAL REGION
LOCATION DETAILED: MID-OCCIPITAL SCALP
LOCATION DETAILED: LEFT ANTERIOR SHOULDER

## 2020-05-12 ASSESSMENT — LOCATION ZONE DERM
LOCATION ZONE: TRUNK
LOCATION ZONE: LEG
LOCATION ZONE: FACE
LOCATION ZONE: SCALP
LOCATION ZONE: ARM

## 2020-05-12 NOTE — PROCEDURE: ADDITIONAL NOTES
Detail Level: Simple
Additional Notes: Recomm Zyrtec qhs and Allegra q am \\n\\nCera ve anti itch cream. Samples given.

## 2020-05-12 NOTE — PROCEDURE: OTHER
Detail Level: Zone
Other (Free Text): If frontal scalp does not resolve will biopsy.
Note Text (......Xxx Chief Complaint.): This diagnosis correlates with the

## 2021-08-10 ENCOUNTER — HOSPITAL ENCOUNTER (OUTPATIENT)
Dept: PHYSICAL THERAPY | Age: 81
Discharge: HOME | End: 2021-08-10
Payer: MEDICARE

## 2021-08-10 PROCEDURE — 97161 PT EVAL LOW COMPLEX 20 MIN: CPT

## 2021-08-10 PROCEDURE — 97110 THERAPEUTIC EXERCISES: CPT

## 2021-08-10 NOTE — THERAPY EVALUATION
Salomón Merino  : 1940  [place]  Secondary: [employer] Generic [address] at [facility]  [address], [address], [place]  [phone]   [phone]         OUTPATIENT PHYSICAL THERAPY:Initial Assessment 8/10/2021   ICD-10: Treatment Diagnosis:  Low back pain [M54.5]; Precautions/Allergies:   Sulfa (sulfonamide antibiotics) and Tape [adhesive]   TREATMENT PLAN:  Effective Dates: 8/10/2021 TO 2021 (90 days). Frequency/Duration: 2 times a week for 90 Day(s) MEDICAL/REFERRING DIAGNOSIS:  Low back pain [M54.5]  Other chronic pain [G89.29]     DATE OF ONSET: Chronic pain for 3 years  REFERRING PHYSICIAN: Natanael Colbert,*  MD Orders: Friend Aid and treat  Return MD Appointment: TBD     INITIAL ASSESSMENT:  Ms. Richard Espana presents to PT with reports of lower back and hip pain with leg weakness. She states symptoms have been present for about two to three years and does not have a mechanism of injury. It mostly presents in L pelvic region and does not radiate; she states the pain begins in her mid back in the morning and then travels to the R during the day. She experiences the symptomatic pain while on long walks, while doing yard work, and when standing for long durations. Sitting for five minutes alleviates the pain. She sleeps in multiple positions and usually wakes every two hours. Pt is of low complexity due to the chronic and stable nature of symptoms. PROBLEM LIST (Impacting functional limitations):  1. Decreased Strength  2. Decreased ADL/Functional Activities  3. Decreased Ambulation Ability/Technique  4. Increased Pain  5. Decreased Activity Tolerance  6. Decreased Flexibility/Joint Mobility  7. Decreased Will with Home Exercise Program INTERVENTIONS PLANNED: (Treatment may consist of any combination of the following)  1. Gait Training  2. Home Exercise Program (HEP)  3. Manual Therapy  4.  Range of Motion (ROM)  5. Therapeutic Exercise/Strengthening     GOALS: (Goals have been discussed and agreed upon with patient.)  Short-Term Functional Goals: Time Frame: 4-6 weeks  1. Pt will complete outdoor lawn care for one day with maximum 2/10 pain to demonstrate ability to complete ADLs  2. Pt will walk for 15 min without complaints of symptoms to demonstrate return independent ambulation  3. Pt will improve in Modified Oswestry LBP questionnaire by 2-3 points  Discharge Goals: Time Frame: 6-8 weeks  1. Pt will complete all ADLs for one day without symptomatic pain to demonstrate return to PLOF  2. Pt will perform outdoor locomotion for one day without symptoms to demonstrate independent community ambulation  8. Pt will improve in Modified Oswestry LBP questionnaire by 6-8 points    OUTCOME MEASURE:   Tool Used: Modified Oswestry Low Back Pain Questionnaire  Score:  Initial: 10/50  Most Recent: X/50 (Date: -- )   Interpretation of Score: Each section is scored on a 0-5 scale, 5 representing the greatest disability. The scores of each section are added together for a total score of 50. MEDICAL NECESSITY:   · Patient is expected to demonstrate progress in strength, range of motion, balance and functional technique to increase independence with ADLs and community ambulation. REASON FOR SERVICES/OTHER COMMENTS:  · Pt presents with chronic low back pain. Total Duration:  35 minutes  PT Patient Time In/Time Out  Time In: 0935  Time Out: 1030    Rehabilitation Potential For Stated Goals: Good  Regarding Tej Holt therapy, I certify that the treatment plan above will be carried out by a therapist or under their direction.   Thank you for this referral,  Tan Denise, BRENDEN Braxton, MS, PT    Referring Physician Signature: Al Farrell,* _______________________________ Date _____________      PAIN/SUBJECTIVE:   Initial: Pain Intensity 1: 1  Post Session:  n/a/10   HISTORY:   History of Injury/Illness (Reason for Referral):  Pt reports to PT with chronic low back pain. She states it began about three years ago and does not recall an event where it triggered the pain. Pt does have an extensive surgical history which includes surgery for bursitis in the R hip, B TKA, and hemilaminectomy with disc excision in the L L2-3. Past Medical History/Comorbidities:   Ms. Real Jeronimo  has a past medical history of Atrial septal aneurysm, CAD (coronary artery disease), Dyslipidemia, Heart murmur, History of breast cancer (), Hypertension, Osteoarthritis, Paroxysmal atrial fibrillation (HCC), Patent foramen ovale, and Valvular heart disease. She also has no past medical history of Adverse effect of anesthesia, Asthma, Autoimmune disease ([address] Chronic kidney disease, Chronic obstructive pulmonary disease ([address] Chronic pain, Coagulation disorder ([address] Diabetes ([address] Difficult intubation, GERD (gastroesophageal reflux disease), Heart failure ([address] Ill-defined condition, Liver disease, Malignant hyperthermia due to anesthesia, Morbid obesity ([address] Nausea & vomiting, Pseudocholinesterase deficiency, Psychiatric disorder, PUD (peptic ulcer disease), Seizures ([address] Stroke ([address] Thromboembolus ([address] Thyroid disease, Unspecified adverse effect of anesthesia, or Unspecified sleep apnea. Ms. Real Jeronimo  has a past surgical history that includes hx mastectomy (Left, ); hx hysterectomy (); hx  section; hx cholecystectomy (); hx cataract removal (Bilateral); hx back surgery; hx rotator cuff repair (Left, ); and hx other surgical (2016). Social History/Living Environment:     Pt lives alone in a single story home  Prior Level of Function/Work/Activity:  Independent, retired, active.  Pt previously walked routinely for exercise  Dominant Side:         LEFT   Ambulatory/Rehab Services H2 Model Falls Risk Assessment   Risk Factors:       No Risk Factors Identified Ability to Rise from Chair:       (1) Pushes up, successful in one attempt   Falls Prevention Plan:       No modifications necessary   Total: (5 or greater = High Risk): 1   ©2010 [facility] of [employer]. All Rights Reserved. [employer] Patent #[id number]. Federal Law prohibits the replication, distribution or use without written permission from [facility] [employer]   Current Medications:       Current Outpatient Medications:     hydroCHLOROthiazide (HYDRODIURIL) 25 mg tablet, Take 25 mg by mouth daily. , Disp: , Rfl:     amiodarone (CORDARONE) 200 mg tablet, Take 200 mg by mouth daily. Indications: VENTRICULAR RATE CONTROL IN ATRIAL FIBRILLATION, Disp: , Rfl:     traMADol (ULTRAM) 50 mg tablet, Take 50 mg by mouth every six (6) hours as needed for Pain. pt may take 1-2 tabs as needed for pain, Disp: , Rfl:     methocarbamol (ROBAXIN) 750 mg tablet, Take 750 mg by mouth four (4) times daily as needed (muscle relaxer). as needed for muscle spasms, Disp: , Rfl:     apixaban (ELIQUIS) 5 mg tablet, Take 5 mg by mouth every twelve (12) hours. , Disp: , Rfl:     celecoxib (CELEBREX) 200 mg capsule, Take 200 mg by mouth two (2) times a day., Disp: , Rfl:     warfarin (COUMADIN) 2 mg tablet, Take 2 mg by mouth daily. , Disp: , Rfl:     senna (SENOKOT) 8.6 mg tablet, Take 2 Tabs by mouth daily. , Disp: , Rfl:     oxyCODONE (OXYIR) 5 mg capsule, Take 5 mg by mouth every four (4) hours as needed for Pain., Disp: , Rfl:     magnesium 250 mg tab, Take 1 Tab by mouth nightly., Disp: , Rfl:     multivitamin (ONE A DAY) tablet, Take 1 Tab by mouth daily. , Disp: , Rfl:     acetaminophen (TYLENOL EXTRA STRENGTH) 500 mg tablet, Take 500 mg by mouth every six (6) hours as needed for Pain., Disp: , Rfl:     calcium-cholecalciferol, d3, 600-125 mg-unit tab, Take 1 Tab by mouth daily. , Disp: , Rfl:     fenofibrate (TRICOR) 160 mg tablet, Take 160 mg by mouth nightly., Disp: , Rfl:     rosuvastatin (CRESTOR) 10 mg tablet, Take 10 mg by mouth nightly., Disp: , Rfl:     benazepril (LOTENSIN) 20 mg tablet, Take 20 mg by mouth daily. Indications: HYPERTENSION, Disp: , Rfl:    Date Last Reviewed:  8/10/2021     Number of Personal Factors/Comorbidities that affect the Plan of Care: 0: LOW COMPLEXITY   EXAMINATION:   Observation/Orthostatic Postural Assessment:          Observation/Orthostatic Postural Assessment:    Relaxed standing posture:  · Higher L pelvic crest, P  · Increased weight bearing on L LE    Palpation/tone/tissue texture:    · TTP in R superior greater trochanter  · TTP in R PSIS, NP in L  Leg Length: supine:  symmetrical       Long Sitting: n/a      ROM:   Multisegmental ROM Date:  8/10/2021       Flexion 60 deg   Extension 10 deg   Rotation L 25%, P   Rotation R 25%   Side bend L 50%   Side bend R 50%      Hip ROM Date:  8/10/2021       Right Left   Flexion 120 deg    Extension n/a n/a   Adduction P NP   Abduction 45 deg 45 deg     Knee ROM Date:  8/10/2021       Right Left   Flexion WNL WNL   Extension Signs of  tightness Signs of tightness         Strength:     Hip Strength Date:  8/10/2021       Right Left   Flexion 4/4 4-/5, P   Extension  4-/5, glute bridge   IR n/a n/a   ER  4-/5   Abduction  4-/5      Knee Strength Date:  8/10/2021       Right Left   Flexion 4+/5 5   Extension 4+/5 4+/5                 Functional Mobility:  · Gait Pattern: R hip higher than L with some rotation at terminal stance  Balance:  Single leg   L: n/a  R: n/a       Body Structures Involved:  1. Bones  2. Joints  3. Muscles  4. Ligaments Body Functions Affected:  1. Sensory/Pain  2. Neuromusculoskeletal  3. Movement Related  4. None Activities and Participation Affected:  1. General Tasks and Demands  2. Mobility  3. Domestic Life  4.  Community, Social and [place]   Number of elements (examined above) that affect the Plan of Care: 1-2: LOW COMPLEXITY   CLINICAL PRESENTATION:   Presentation: Stable and uncomplicated: LOW COMPLEXITY   CLINICAL DECISION MAKING:   Use of outcome tool(s) and clinical judgement create a POC that gives a: Clear prediction of patient's progress: LOW COMPLEXITY            Warren Morrison, SPT

## 2021-08-10 NOTE — PROGRESS NOTES
Smiley Tariq  : 1940  [place]  Secondary: [address] at [facility]  [address] , [address], [place]  [phone]   [phone]      OUTPATIENT PHYSICAL THERAPY: Daily Treatment Note 8/10/2021  Visit Count:  1    ICD-10: Treatment Diagnosis:  Low back pain [M54.5]; Precautions/Allergies:   Sulfa (sulfonamide antibiotics) and Tape [adhesive]   TREATMENT PLAN:  Effective Dates: 8/10/2021 TO 2021 (90 days). Frequency/Duration: 2 times a week for 90 Day(s) MEDICAL/REFERRING DIAGNOSIS:  Low back pain [M54.5]  Other chronic pain [G89.29]     DATE OF ONSET: Chronic pain for 3 years  REFERRING PHYSICIAN: Bud Curran,*  MD Orders: Eval and treat  Return MD Appointment: TBD       Pre-treatment Symptoms/Complaints:  Lower back pain, hip pain, and leg weakness  Pain: Initial: Pain Intensity 1: 10 Post Session:  n/a/10   Medications Last Reviewed:  8/10/2021  Updated Objective Findings:  See evaluation note from today  TREATMENT:     THERAPEUTIC EXERCISE: (15 minutes):  Exercises per grid below to improve mobility and strength. Required minimal visual and verbal cues to promote proper body posture and promote proper body mechanics. Progressed (no progression) as indicated. Date:  8/10/21 Date:   Date:     Activity/Exercise Parameters Parameters Parameters   Education Discussed HEP  Educated on sleep posture, shoe inserts, and icing for pain modulation and decrease swelling     SKC 20 sec, each     Knee/hip rocks 5x each     Pelvic tilts 5x                                     [employer] Portal  Treatment/Session Summary:    · Response to Treatment:  Pt tolerate treatment well without any complaints of symptomatic pain besides during endrange .   · Communication/Consultation:  None today  · Equipment provided today:  None today  · Recommendations/Intent for next treatment session: Next visit will focus on progressing plan of care.     Total Treatment Billable Duration:  Evaluation 35 min, Therex 15 minutes  PT Patient Time In/Time Out  Time In: 0935  Time Out: [address], SPT    Future Appointments   Date Time Provider Sonia Jayshree   8/17/2021  2:30 PM Jelani Fore, PT [facility]   8/24/2021  1:45 PM Desdune-Ascencion, Angiylann C, PT [facility]   8/26/2021  1:45 PM Desdune-Ascencion, Cherylann C, PT [facility]   8/31/2021  2:30 PM Desdune-Ascencion, Cherylann C, PT [facility]   9/2/2021 10:30 AM Desdune-Ascencion, Angiylann C, PT [facility]   9/7/2021  9:45 AM Desdune-Ascencion, Cherylann C, PT [facility]   9/9/2021 10:30 AM Desdune-Ascencion, Angiylann C, PT [facility]   9/14/2021 10:30 AM Desdune-Ascencion, Cherylann C, PT [facility]   9/16/2021 10:30 AM Desdune-Ascencion, Cherylann C, PT [facility]   9/21/2021 10:30 AM Desdune-Ascencion, Cherylann C, PT [facility]   9/23/2021 10:30 AM Desdune-Ascencion, Cherylann C, PT [facility]   9/28/2021 10:30 AM Desdune-Ascencion, Cherylann C, PT [facility]   9/30/2021 10:30 AM Desdune-Ascencion, Cherylann C, PT [facility]

## 2021-08-17 ENCOUNTER — HOSPITAL ENCOUNTER (OUTPATIENT)
Dept: PHYSICAL THERAPY | Age: 81
Discharge: HOME | End: 2021-08-17
Payer: MEDICARE

## 2021-08-17 PROCEDURE — 97110 THERAPEUTIC EXERCISES: CPT

## 2021-08-17 NOTE — PROGRESS NOTES
Umberto Webber  : 1940  [place]  Secondary: [address] at [facility]  [facility] [address], [place] [address]  [phone]   [phone]      OUTPATIENT PHYSICAL THERAPY: Daily Treatment Note 2021  Visit Count:  2    ICD-10: Treatment Diagnosis:  Low back pain [M54.5]; Precautions/Allergies:   Sulfa (sulfonamide antibiotics) and Tape [adhesive]   TREATMENT PLAN:  Effective Dates: 8/10/2021 TO 2021 (90 days). Frequency/Duration: 2 times a week for 90 Day(s) MEDICAL/REFERRING DIAGNOSIS:  Low back pain [M54.5]  Other chronic pain [G89.29]     DATE OF ONSET: Chronic pain for 3 years  REFERRING PHYSICIAN: Concetta Santizo,*  MD Orders: Arleen Hampton and treat  Return MD Appointment: TBD       Pre-treatment Symptoms/Complaints:  Lower back pain and hip pain persists  Pain: Initial: Pain Intensity 1: 2/10 Post Session:  n/a/10   Medications Last Reviewed:  2021  Updated Objective Findings:  Patient reports difficulty with performing household tasks such as mopping. Patient reports pain on the lateral parts of her lower back (not in the medial part of the back) and right hip pain. Patient states that her hip pain is not as bad as her back pain currently. TREATMENT:     THERAPEUTIC EXERCISE: (40 minutes):  Exercises per grid below to improve mobility and strength. Required minimal visual and verbal cues to promote proper body posture and promote proper body mechanics. Progressed resistance, repetitions, complexity of movement and (no progression) as indicated.    Date:  8/10/21 Date:  21 Date:     Activity/Exercise Parameters Parameters Parameters   Education Discussed HEP  Educated on sleep posture, shoe inserts, and icing for pain modulation and decrease swelling HEP updated and revised    SKC 20 sec, each 2 x 20 secs B    Knee/hip rocks 5x each     Pelvic tilts 5x 10x    Nustep  8' level    TA bracing 8' with cues    Hip adduction  With ball 2 x 10    Hip abduction  2 x 10 lime TB    Bridges  2 x 5    MANUAL THERAPY: (5 minutes): Joint mobilization was utilized and necessary because of the patient's restricted joint motion and radicular symptoms. [facility] Portal  Treatment/Session Summary:    · Response to Treatment:  Patient tolerated the progression of exercises well today. Right hip discomfort relieved with distraction. · Communication/Consultation:  None today  · Equipment provided today:  None today  · Recommendations/Intent for next treatment session: Next visit will focus on progressing plan of care.     Total Treatment Billable Duration:  40 min Therex, 5 minutes manual  PT Patient Time In/Time Out  Time In: 1430 (1430)  Time Out: 1515  Winter Barboza, PT    Future Appointments   Date Time Provider Sonia Martell   8/24/2021  1:45 PM Jhon Coker, PT [facility]   8/26/2021  1:45 PM Kimberlyne-Guerrero Vegas C, PT [facility]   8/31/2021  2:30 PM Desdune-Guerrero Vegas C, PT [facility]   9/2/2021 10:30 AM Desdune-Angi Vegasylgia C, PT [facility]   9/7/2021  9:45 AM Desdune-Angi Vegasylgia C, PT [facility]   9/9/2021 10:30 AM Desdune-Angi Vegasylgia C, PT [facility]   9/14/2021 10:30 AM Desdune-Angi Vegasylgia C, PT [facility]   9/16/2021 10:30 AM Desdune-Angi Vegasylann C, PT [facility]   9/21/2021 10:30 AM Desdune-Ascencion, Angiylann C, PT [facility]   9/23/2021 10:30 AM Desdune-Angi Vegasylgia C, PT [facility]   9/28/2021 10:30 AM Desdune-Angi Vegasylgia C, PT [facility]   9/30/2021 10:30 AM Desdune-Angi Vegasylgia C, PT [facility]

## 2021-08-24 ENCOUNTER — HOSPITAL ENCOUNTER (OUTPATIENT)
Dept: PHYSICAL THERAPY | Age: 81
Discharge: HOME | End: 2021-08-24
Payer: MEDICARE

## 2021-08-24 PROCEDURE — 97140 MANUAL THERAPY 1/> REGIONS: CPT

## 2021-08-24 PROCEDURE — 97110 THERAPEUTIC EXERCISES: CPT

## 2021-08-24 NOTE — PROGRESS NOTES
Rodolfo Alcaraz  : 1940  [place]  Secondary: [address] [facility]  [address] , [address], [place] [address]  [phone]   [phone]      OUTPATIENT PHYSICAL THERAPY: Daily Treatment Note 2021  Visit Count:  3    ICD-10: Treatment Diagnosis:  Low back pain [M54.5]; Precautions/Allergies:   Sulfa (sulfonamide antibiotics) and Tape [adhesive]   TREATMENT PLAN:  Effective Dates: 8/10/2021 TO 2021 (90 days). Frequency/Duration: 2 times a week for 90 Day(s) MEDICAL/REFERRING DIAGNOSIS:  Low back pain [M54.5]  Other chronic pain [G89.29]     DATE OF ONSET: Chronic pain for 3 years  REFERRING PHYSICIAN: Tabitha Vieira,*  MD Orders: Candy Butler and treat  Return MD Appointment: TBD       Pre-treatment Symptoms/Complaints:  Patient with complaints of increased low back pain today thinking she  may have over done it with her exercises and picking up limbs in her yard. Pain: 6/10 Post Session:  0/10 post vijay   Medications Last Reviewed:  2021  Updated Objective Findings:    TREATMENT:     THERAPEUTIC EXERCISE: (30 minutes):  Exercises per grid below to improve mobility and strength. Required minimal visual and verbal cues to promote proper body posture and promote proper body mechanics. Progressed resistance, repetitions, complexity of movement and (no progression) as indicated.    Date:  8/10/21 Date:  21 Date:  21   Activity/Exercise Parameters Parameters Parameters   Education Discussed HEP  Educated on sleep posture, shoe inserts, and icing for pain modulation and decrease swelling HEP updated and revised    SKC 20 sec, each 2 x 20 secs B 2 x 30 secs B   Knee/hip rocks 5x each     Pelvic tilts 5x 10x 2 x10   Nustep  8' level 8'    TA bracing  8' with cues    Hip adduction  With ball 2 x 10 With ball 2 x 10   Hip abduction  2 x 10 lime TB 1 x 10 lime TB   Bridges  2 x 5    Supine marching 2 x 10   Piriformis stretch    2 x 20 sec w PT   MANUAL THERAPY: (10 minutes): Joint mobilization and Soft tissue mobilization was utilized and necessary because of the patient's restricted joint motion and restricted motion of soft tissue. STM/MFR of piriformis and glut med at trochanter  Gentle inf hip glide    ICE: (10 minutes) for pain and analgesia (no charge)  [facility] Portal  Treatment/Session Summary:    · Response to Treatment:  Patient tolerated the progression of exercises well today. Patient experienced low back pain with supine marching exercise. .  · Communication/Consultation:  Educated patient to consider purchasing a lumbar brace to support her during ADLs such as yardwork. · Equipment provided today:  None today  · Recommendations/Intent for next treatment session: Next visit will focus on progressing plan of care.     Total Treatment Billable Duration:  40 minutes (30 min Therex, 10 min manual)  PT Patient Time In/Time Out  Time In: 1345  Time Out: 1440  Guerrero Covington Sender, PT    Future Appointments   Date Time Provider Sonia Martell   8/26/2021  1:45 PM Paco Mccullough, PT [facility]   8/31/2021  2:30 PM DesGuerrero Smith, PT [facility]   9/2/2021 10:30 AM Guerrero Love, PT [facility]   9/7/2021  9:45 AM Guerrero Love, PT [facility]   9/9/2021 10:30 AM Guerrero Love, PT [facility]   9/14/2021 10:30 AM DesGuerrero Smith, PT [facility]   9/16/2021 10:30 AM Desdune-Guerrero Vegas, PT [facility]   9/21/2021 10:30 AM Guerrero Love, PT [facility]   9/23/2021 10:30 AM DesGuerrero Smith, PT [facility]   9/28/2021 10:30 AM Guerrero Love, PT [facility]   9/30/2021 10:30 AM [facility] Love[facility]

## 2021-08-26 ENCOUNTER — HOSPITAL ENCOUNTER (OUTPATIENT)
Dept: PHYSICAL THERAPY | Age: 81
Discharge: HOME | End: 2021-08-26
Payer: MEDICARE

## 2021-08-26 PROCEDURE — 97110 THERAPEUTIC EXERCISES: CPT

## 2021-08-26 NOTE — PROGRESS NOTES
Smiley Tariq  : 1940  [place]  Secondary: [address] [facility]  [address] , [address], [place] [address]  [phone]   [phone]      OUTPATIENT PHYSICAL THERAPY: Daily Treatment Note 2021  Visit Count:  4    ICD-10: Treatment Diagnosis:  Low back pain [M54.5]; Precautions/Allergies:   Sulfa (sulfonamide antibiotics) and Tape [adhesive]   TREATMENT PLAN:  Effective Dates: 8/10/2021 TO 2021 (90 days). Frequency/Duration: 2 times a week for 90 Day(s) MEDICAL/REFERRING DIAGNOSIS:  Low back pain [M54.5]  Other chronic pain [G89.29]     DATE OF ONSET: Chronic pain for 3 years  REFERRING PHYSICIAN: Bud Curran,*  MD Orders: Aiyana Hwang and treat  Return MD Appointment: TBD       Pre-treatment Symptoms/Complaints:  Patient reports having complaints of burring in both legs while walking. Pain: 3/10 Post Session:  2-3/10    Medications Last Reviewed:  2021  Updated Objective Findings:    TREATMENT:     THERAPEUTIC EXERCISE: (40 minutes):  Exercises per grid below to improve mobility and strength. Required minimal visual and verbal cues to promote proper body posture and promote proper body mechanics. Progressed resistance, repetitions and complexity of movement as indicated.    Date:  8/10/21 Date:  21 Date:  21 Date:   21   Activity/Exercise Parameters Parameters Parameters Parameters   Education Discussed HEP  Educated on sleep posture, shoe inserts, and icing for pain modulation and decrease swelling HEP updated and revised  HEP updated   SKC 20 sec, each 2 x 20 secs B 2 x 30 secs B 2 x 20 secs B    Seated lumbar flexion     8x with green ball   Seated flexion stretch     5x B reach to opposite side    Knee/hip rocks 5x each      Pelvic tilts 5x 10x 2 x10    Nustep  8' level 1 8' level 1 7' level 1    TA bracing  8' with cues     Hip adduction  With ball 2 x 10 With ball 2 x 10 2  X 10 with ball   Hip abduction  2 x 10 lime TB 1 x 10 lime TB 2 x 10 with orange TB   Bridges  2 x 5     Clamshell (Hooklying)    2 x 10 with orange TB   S/L clam        Supine marching    2 x 10 2 x 10   Piriformis stretch    2 x 20 sec w PT    MANUAL THERAPY: (5 minutes): Joint mobilization was utilized and necessary because of the patient's restricted joint motion. Lumbar distraction in sidelying grade 2  MOIST HOT PACK: (10 mins)  For relaxation and pain relief (no charge)  ICE: (10 minutes) for pain and analgesia (no charge)  [employer] [facility] Portal  Access Code: [email or web address]  URL: [email or web address]  Date: 08/26/2021  Prepared by: Leobardo Schwab    Exercises  Seated Flexion Stretch - 1 x daily - 7 x weekly - 1 sets - 5 reps - 20 hold  Hooklying Single Knee to Chest Stretch - 2 x daily - 7 x weekly - 3 sets - 1 reps - 30 sec hold  Supine Lower Trunk Rotation - 1 x daily - 7 x weekly - 1 sets - 10 reps - 5 hold  Supine 90/90 Alternating Toe Touch - 1 x daily - 7 x weekly - 2 sets - 10 reps  Hooklying Clamshell with Resistance - 1 x daily - 7 x weekly - 2 sets - 10 reps      Treatment/Session Summary:    · Response to Treatment:  Patient tolerated treatment fairly well, but needed a slower transition through the exercises. · Communication/Consultation:  HEP  · Equipment provided today:  HEP  · Recommendations/Intent for next treatment session: Next visit will focus on progressing plan of care.     Total Treatment Billable Duration:  45 minutes (40 min Therex, 5 min manual)  PT Patient Time In/Time Out  Time In: 1345  Time Out: [address] Laurie Ramírez, PT    Future Appointments   Date Time Provider Sonia Martell   8/31/2021  2:30 PM Jhon Coker, PT [facility]   9/2/2021 10:30 AM Guerrero Love, PT [facility]   9/7/2021  9:45 AM Guerrero Love, PT [facility]   9/9/2021 10:30 AM [facility], [facility] 9/14/2021 10:30 AM Guerrero Love, PT [facility]   9/16/2021 10:30 AM Guerrero Love, PT [facility]   9/21/2021 10:30 AM Guerrero Love, PT [facility]   9/23/2021 10:30 AM Guerrero Love, PT [facility]   9/28/2021 10:30 AM Guerrero Love, PT [facility]   9/30/2021 10:30 AM Guerrero Love, PT [facility]

## 2021-08-31 ENCOUNTER — HOSPITAL ENCOUNTER (OUTPATIENT)
Dept: PHYSICAL THERAPY | Age: 81
Discharge: HOME | End: 2021-08-31
Payer: MEDICARE

## 2021-08-31 PROCEDURE — 97110 THERAPEUTIC EXERCISES: CPT

## 2021-08-31 NOTE — PROGRESS NOTES
Ree Clancy  : 1940  [place]  Secondary: [address] [facility]  [address] , [address], [place] [address]  [phone]   [phone]      OUTPATIENT PHYSICAL THERAPY: Daily Treatment Note 2021  Visit Count:  5    ICD-10: Treatment Diagnosis:  Low back pain [M54.5]; Precautions/Allergies:   Sulfa (sulfonamide antibiotics) and Tape [adhesive]   TREATMENT PLAN:  Effective Dates: 8/10/2021 TO 2021 (90 days). Frequency/Duration: 2 times a week for 90 Day(s) MEDICAL/REFERRING DIAGNOSIS:  Low back pain [M54.5]  Other chronic pain [G89.29]     DATE OF ONSET: Chronic pain for 3 years  REFERRING PHYSICIAN: Pari Melo,*  MD Orders: Wayne Draft and treat  Return MD Appointment: TBD       Pre-treatment Symptoms/Complaints:  Patient reports having persistent leg, hip and low back pain with walking/ standing and most difficulty with initial steps after sitting for a while  Pain: .5/10 Post Session: 4/10    Medications Last Reviewed:  2021  Updated Objective Findings:    TREATMENT:     THERAPEUTIC EXERCISE: (40 minutes):  Exercises per grid below to improve mobility and strength. Required minimal visual and verbal cues to promote proper body posture and promote proper body mechanics. Progressed resistance, repetitions and complexity of movement as indicated.    Date:  8/10/21 Date:  21 Date:  21 Date:   21 Date:   21   Activity/Exercise Parameters Parameters Parameters Parameters    Education Discussed HEP  Educated on sleep posture, shoe inserts, and icing for pain modulation and decrease swelling HEP updated and revised  HEP updated Reviewed exercises performed at home given by other practiotioners   SKC 20 sec, each 2 x 20 secs B 2 x 30 secs B 2 x 20 secs B  2 x 20 secs B   Seated lumbar flexion     8x with green ball    Seated flexion stretch     5x B reach to opposite side Knee/hip rocks 5x each    5x   Pelvic tilts 5x 10x 2 x10     Nustep  8' level 1 8' level 1 7' level 1  7' Level 1   TA bracing  8' with cues      Hip adduction  With ball 2 x 10 With ball 2 x 10 2  X 10 with ball    Hip abduction  2 x 10 lime TB 1 x 10 lime TB 2 x 10 with orange TB    Bridges  2 x 5      Clamshell (Hooklying)    2 x 10 with orange TB    SLR     1 x 10 B   S/L clam         Supine marching    2 x 10 2 x 10    Piriformis stretch    2 x 20 sec w PT  2 x 20 sec    MANUAL THERAPY: (5 minutes): Joint mobilization was utilized and necessary because of the patient's restricted joint motion. LAT of RLE  Inferior glides of right hip    MOIST HOT PACK: (0 mins)  For relaxation and pain relief (no charge)  ICE: (0 minutes) for pain and analgesia (no charge)    [place] Portal  Access Code: [email or web address]  URL: [email or web address]  Date: 08/31/2021  Prepared by: Kathi Blancas    Exercises  Seated Flexion Stretch - 1 x daily - 7 x weekly - 1 sets - 5 reps - 20 hold  Hooklying Single Knee to Chest Stretch - 2 x daily - 7 x weekly - 3 sets - 1 reps - 30 sec hold  Supine Lower Trunk Rotation - 1 x daily - 7 x weekly - 1 sets - 10 reps - 5 hold  Hooklying Clamshell with Resistance - 1 x daily - 7 x weekly - 2 sets - 10 reps  Supine March - 1 x daily - 7 x weekly - 2 sets - 10 reps      Treatment/Session Summary:    · Response to Treatment:  Patients remains easily aggravated and upon review of exercises perfromed at home, she was doing very advanced abdominal legs lifts with poor spinal stabilization. Patient advised to hold all exercises and perform only those reviewed today. · Communication/Consultation:  HEP. Advised patient to schedule follow up with referring physician   · Equipment provided today:  HEP  · Recommendations/Intent for next treatment session: Next visit will focus on progressing plan of care.     Total Treatment Billable Duration:  45 minutes (40 min Therex, 5 min manual)  PT Patient Time In/Time Out  Time In: 1430  Time Out: 1515  Guerrero Stewart, PT    Future Appointments   Date Time Provider Sonia Martell   9/2/2021 10:30 AM Mae Varma, PT [facility]   9/7/2021  9:45 AM Guerrero Love, PT [facility]   9/9/2021 10:30 AM Guerrero Love, PT [facility]   9/14/2021 10:30 AM Kimberlyne-Guerrero Vegas, PT [facility]   9/16/2021 10:30 AM Guerrero Love, PT [facility]   9/21/2021 10:30 AM Ryan-Guerrero Vegas, PT [facility]   9/23/2021 10:30 AM Kimberlyne-Guerrero Vegas, PT [facility]   9/28/2021 10:30 AM Destonyne-Guerrero Vegas, PT [facility]   9/30/2021 10:30 AM Desdune-Guerrero Vegas, PT [facility]

## 2021-09-02 ENCOUNTER — HOSPITAL ENCOUNTER (OUTPATIENT)
Dept: PHYSICAL THERAPY | Age: 81
Discharge: HOME | End: 2021-09-02
Payer: MEDICARE

## 2021-09-02 PROCEDURE — 97110 THERAPEUTIC EXERCISES: CPT

## 2021-09-02 NOTE — PROGRESS NOTES
Rodolfo Alcaraz  : 1940  [place]  Secondary: [address] [facility]  [place] , [address], [place] [address]  [phone]   [phone]      OUTPATIENT PHYSICAL THERAPY: Daily Treatment Note 2021  Visit Count:  6    ICD-10: Treatment Diagnosis:  Low back pain [M54.5]; Precautions/Allergies:   Sulfa (sulfonamide antibiotics) and Tape [adhesive]   TREATMENT PLAN:  Effective Dates: 8/10/2021 TO 2021 (90 days). Frequency/Duration: 2 times a week for 90 Day(s) MEDICAL/REFERRING DIAGNOSIS:  Low back pain [M54.5]  Other chronic pain [G89.29]     DATE OF ONSET: Chronic pain for 3 years  REFERRING PHYSICIAN: Tabitha Vieira,*  MD Orders: Eval and treat  Return MD Appointment: TBD       Pre-treatment Symptoms/Complaints:  Patient reports she has stopped doing advanced exercises at home since her last visit and has experienced a significant  decrease in symptoms since then. Pain: .0/10 Post Session: 4/10    Medications Last Reviewed:  2021  Updated Objective Findings:    TREATMENT:     THERAPEUTIC EXERCISE: (40 minutes):  Exercises per grid below to improve mobility and strength. Required minimal visual and verbal cues to promote proper body posture and promote proper body mechanics. Progressed resistance, repetitions and complexity of movement as indicated.    Date:  8/10/21 Date:  21 Date:  21 Date:   21 Date:   21 Date:   21   Activity/Exercise Parameters Parameters Parameters Parameters     Education Discussed HEP  Educated on sleep posture, shoe inserts, and icing for pain modulation and decrease swelling HEP updated and revised  HEP updated Reviewed exercises performed at home given by other practiotioners    SKC 20 sec, each 2 x 20 secs B 2 x 30 secs B 2 x 20 secs B  2 x 20 secs B 2 x 20 sec B   DKC on ball      10x    Seated lumbar flexion     8x with green ball Seated flexion stretch     5x B reach to opposite side      Knee/hip rocks 5x each    5x    Pelvic tilts 5x 10x 2 x10      Nustep  8' level 1 8' level 1 7' level 1  7' Level 1 8' level 1   TA bracing  8' with cues       Hip adduction  With ball 2 x 10 With ball 2 x 10 2  X 10 with ball     Hip abduction  2 x 10 lime TB 1 x 10 lime TB 2 x 10 with orange TB     Bridges  2 x 5    1 x 10   Clamshell (Hooklying)    2 x 10 with orange TB     SLR     1 x 10 B 2 x 10 B   Supine clam      2 x 10    S/L clam          Supine marching    2 x 10 2 x 10  2 x 10    Piriformis stretch    2 x 20 sec w PT  2 x 20 sec     LTR      10x   Seated reach down to floor       3 x 15 secs center/oblique   Standing marching       10x on airex    Standing abduction       1 x 10 B on airex    MANUAL THERAPY: (0 minutes): Joint mobilization was utilized and necessary because of the patient's restricted joint motion. (not performed today)   LAT of RLE  Inferior glides of right hip    MOIST HOT PACK: (0 mins)  For relaxation and pain relief (no charge)  ICE: (10 minutes) for pain and analgesia (no charge)    [facility] Portal  Access Code: [email or web address]  URL: [email or web address]  Date: 08/31/2021  Prepared by: Luis Vargas    Exercises  Seated Flexion Stretch - 1 x daily - 7 x weekly - 1 sets - 5 reps - 20 hold  Hooklying Single Knee to Chest Stretch - 2 x daily - 7 x weekly - 3 sets - 1 reps - 30 sec hold  Supine Lower Trunk Rotation - 1 x daily - 7 x weekly - 1 sets - 10 reps - 5 hold  Hooklying Clamshell with Resistance - 1 x daily - 7 x weekly - 2 sets - 10 reps  Supine March - 1 x daily - 7 x weekly - 2 sets - 10 reps      Treatment/Session Summary:    · Response to Treatment:  Patient tolerated treatment very well, only with appropriate response of muscjle fatigue.   · Communication/Consultation:  None today  · Equipment provided today:  None today  · Recommendations/Intent for next treatment session: Next visit will focus on progressing with core/;lumbar stabilization exercises,    Total Treatment Billable Duration:  45 minutes (45 min Therex)  PT Patient Time In/Time Out  Time In: 1030  Time Out: 1115  Nurys Ovalle, PT    Future Appointments   Date Time Provider Sonia Martell   9/7/2021  9:45 AM Bessie Kuo, PT [facility]   9/9/2021 10:30 AM Guerrero Love, PT [facility]   9/14/2021 10:30 AM Guerrero Love, PT [facility]   9/16/2021 10:30 AM Guerrero Love, PT [facility]   9/21/2021 10:30 AM Guerrero Love, PT [facility]   9/23/2021 10:30 AM Guerrero Love, PT [facility]   9/28/2021 10:30 AM Guerrero Love, PT [facility]   9/30/2021 10:30 AM Evan Love, PT [facility]

## 2021-09-07 ENCOUNTER — HOSPITAL ENCOUNTER (OUTPATIENT)
Dept: PHYSICAL THERAPY | Age: 81
Discharge: HOME | End: 2021-09-07
Payer: MEDICARE

## 2021-09-07 PROCEDURE — 97110 THERAPEUTIC EXERCISES: CPT

## 2021-09-07 NOTE — PROGRESS NOTES
Edgardo Cardoza  : 1940  [place]  Secondary: [address] [facility]  [address] , [address], [place] [address]  [phone]   [phone]      OUTPATIENT PHYSICAL THERAPY: Daily Treatment Note 2021  Visit Count:  7    ICD-10: Treatment Diagnosis:  Low back pain [M54.5]; Precautions/Allergies:   Sulfa (sulfonamide antibiotics) and Tape [adhesive]   TREATMENT PLAN:  Effective Dates: 8/10/2021 TO 2021 (90 days). Frequency/Duration: 2 times a week for 90 Day(s) MEDICAL/REFERRING DIAGNOSIS:  Low back pain [M54.5]  Other chronic pain [G89.29]     DATE OF ONSET: Chronic pain for 3 years  REFERRING PHYSICIAN: Earl Wang,*  MD Orders: Eval and treat  Return MD Appointment: TBD       Pre-treatment Symptoms/Complaints:  Patient reports her low back feels better, but walking is limited by burning in her legs; Was able to walk 3x to mailbox, now only doing that once a day. Uses lumbar support when in the garden  Pain: .010 Post Session: 1-2/10    Medications Last Reviewed:  2021  Updated Objective Findings:    TREATMENT:     THERAPEUTIC EXERCISE: (40 minutes):  Exercises per grid below to improve mobility and strength. Required minimal visual and verbal cues to promote proper body posture and promote proper body mechanics. Progressed resistance, repetitions and complexity of movement as indicated.    Date:  8/10/21 Date:  21 Date:  21 Date:   21 Date:   21 Date:   21 Date:  21   Activity/Exercise Parameters Parameters Parameters Parameters      Education Discussed HEP  Educated on sleep posture, shoe inserts, and icing for pain modulation and decrease swelling HEP updated and revised  HEP updated Reviewed exercises performed at home given by other practiotioners     SKC 20 sec, each 2 x 20 secs B 2 x 30 secs B 2 x 20 secs B  2 x 20 secs B 2 x 20 sec B 2 x 20 sec; B   SEPIDEH on ball      10x  10x   Seated lumbar flexion     8x with green ball      Seated flexion stretch     5x B reach to opposite side       Knee/hip rocks 5x each    5x     Pelvic tilts 5x 10x 2 x10    10x   Nustep  8' level 1 8' level 1 7' level 1  7' Level 1 8' level 1 8' level 2   TA bracing  8' with cues        Hip adduction  With ball 2 x 10 With ball 2 x 10 2  X 10 with ball      Hip abduction  2 x 10 lime TB 1 x 10 lime TB 2 x 10 with orange TB      Bridges  2 x 5    1 x 10    Clamshell (Hooklying)    2 x 10 with orange TB   1 x 10; B   SLR     1 x 10 B 2 x 10 B 2 x 10; B   Supine clam      2 x 10     S/L clam           Supine marching    2 x 10 2 x 10  2 x 10  2 x 10    Piriformis stretch    2 x 20 sec w PT  2 x 20 sec      LTR      10x 10x   Seated reach down to floor       3 x 15 secs center/oblique With red SB assist 3 x 15s ea   Standing marching       10x on airex     Standing abduction       1 x 10 B on airex     Isometric hip flexion       10x hold 3 secs   MANUAL THERAPY: (0 minutes): Joint mobilization was utilized and necessary because of the patient's restricted joint motion. (not performed today)   LAT of RLE  Inferior glides of right hip    MOIST HOT PACK: (0 mins)  For relaxation and pain relief (no charge)  ICE: (10 minutes) for pain and analgesia (no charge)    [employer] Portal  Access Code: [email or web address]  URL: [email or web address]  Date: 08/31/2021  Prepared by: Kirke Cousin    Exercises  Seated Flexion Stretch - 1 x daily - 7 x weekly - 1 sets - 5 reps - 20 hold  Hooklying Single Knee to Chest Stretch - 2 x daily - 7 x weekly - 3 sets - 1 reps - 30 sec hold  Supine Lower Trunk Rotation - 1 x daily - 7 x weekly - 1 sets - 10 reps - 5 hold  Hooklying Clamshell with Resistance - 1 x daily - 7 x weekly - 2 sets - 10 reps  Supine March - 1 x daily - 7 x weekly - 2 sets - 10 reps      Treatment/Session Summary:    · Response to Treatment:  Patient did well with exercises today. Required cues with isometric hip flexion for technique. Discussed other treatment considerations.   · Communication/Consultation:  discussed other treatment options if pain worsens, and trial of using lumbar support with standing chores/walking  · Equipment provided today:  None today  · Recommendations/Intent for next treatment session: Next visit will focus on progressing with core/;lumbar stabilization exercises, with focus on flexion based exercises    Total Treatment Billable Duration:  40 minutes (40 min Therex)  PT Patient Time In/Time Out  Time In: 0945  Time Out: 1030  Guerrero Thao, GALE    Future Appointments   Date Time Provider Sonia Martell   9/9/2021 10:30 AM Elieser Brady, PT [facility]   9/14/2021 10:30 AM Guerrero Love, PT [facility]   9/16/2021 10:30 AM Guerrero Love, PT [facility]   9/21/2021 10:30 AM Guerrero Love, PT [facility]   9/23/2021 10:30 AM Guerrero Love, PT [facility]   9/28/2021 10:30 AM Guerrero Love, PT [facility]   9/30/2021 10:30 AM Simon Love, PT [facility]

## 2021-09-09 ENCOUNTER — HOSPITAL ENCOUNTER (OUTPATIENT)
Dept: PHYSICAL THERAPY | Age: 81
Discharge: HOME | End: 2021-09-09
Payer: MEDICARE

## 2021-09-09 PROCEDURE — 97110 THERAPEUTIC EXERCISES: CPT

## 2021-09-09 NOTE — PROGRESS NOTES
Yessenia Fix  : 1940  [place]  Secondary: [address] [facility]  [address] , [address], [place] [address]  [phone]   [phone]      OUTPATIENT PHYSICAL THERAPY: Daily Treatment Note 2021  Visit Count:  8    ICD-10: Treatment Diagnosis:  Low back pain [M54.5]; Precautions/Allergies:   Sulfa (sulfonamide antibiotics) and Tape [adhesive]   TREATMENT PLAN:  Effective Dates: 8/10/2021 TO 2021 (90 days). Frequency/Duration: 2 times a week for 90 Day(s) MEDICAL/REFERRING DIAGNOSIS:  Low back pain [M54.5]  Other chronic pain [G89.29]     DATE OF ONSET: Chronic pain for 3 years  REFERRING PHYSICIAN: Christie Ellison,*  MD Orders: Sherin Saint and treat  Return MD Appointment: TBD       Pre-treatment Symptoms/Complaints:  Patient reports that she is having some pain today after working in her yard for a couple of hours yesterday. Patient states she did not think to wear her lumbar support brace but will try to remember to wear that next time. Pain: .3/10 Post Session: 2/10    Medications Last Reviewed:  2021  Updated Objective Findings:  No new findings today. TREATMENT:     THERAPEUTIC EXERCISE: (45 minutes):  Exercises per grid below to improve mobility and strength. Required minimal visual and verbal cues to promote proper body posture and promote proper body mechanics. Progressed resistance, repetitions and complexity of movement as indicated.    Date:  8/10/21 Date:  21 Date:  21 Date:   21 Date:   21 Date:   21 Date:  21 Date:   21   Activity/Exercise Parameters Parameters Parameters Parameters       Education Discussed HEP  Educated on sleep posture, shoe inserts, and icing for pain modulation and decrease swelling HEP updated and revised  HEP updated Reviewed exercises performed at home given by other practiotioners      SKC 20 sec, each 2 x 20 secs B 2 x 30 secs B 2 x 20 secs B  2 x 20 secs B 2 x 20 sec B 2 x 20 sec; B 2 x 20 sec B   DKC on ball      10x  10x 10x   Seated lumbar flexion     8x with green ball  3 x 15 secs center/oblique With red SB assist 3 x 15s each With red SB 10x each center/oblique   Seated flexion stretch     5x B reach to opposite side        Knee/hip rocks 5x each    5x      Pelvic tilts 5x 10x 2 x10    10x 5x   Nustep  8' level 1 8' level 1 7' level 1  7' Level 1 8' level 1 8' level 2 8' level 2   TA bracing  8' with cues         Hip adduction  With ball 2 x 10 With ball 2 x 10 2  X 10 with ball       Hip abduction  2 x 10 lime TB 1 x 10 lime TB 2 x 10 with orange TB       Bridges  2 x 5    1 x 10     Clamshell (Hooklying)    2 x 10 with orange TB  2 x 10 1 x 10; B 2 x 10   SLR     1 x 10 B 2 x 10 B 2 x 10; B 2 x 10   Supine marching    2 x 10 2 x 10  2 x 10  2 x 10  2 x 10   Piriformis stretch    2 x 20 sec w PT  2 x 20 sec       LTR      10x 10x 10x   Standing marching       10x on airex      Standing abduction       1 x 10 B on airex   1 x 10 B on airex   Isometric hip flexion       10x hold 3 secs    MANUAL THERAPY: (0 minutes): Joint mobilization was utilized and necessary because of the patient's restricted joint motion. (not performed today)   LAT of RLE  Inferior glides of right hip    MOIST HOT PACK: (0 mins)  For relaxation and pain relief (no charge)  ICE: (10 minutes) for pain and analgesia (no charge)    [employer] Portal  Access Code: [email or web address]  URL: [email or web address]  Date: 08/31/2021  Prepared by: Venkat Pain    Exercises  Seated Flexion Stretch - 1 x daily - 7 x weekly - 1 sets - 5 reps - 20 hold  Hooklying Single Knee to Chest Stretch - 2 x daily - 7 x weekly - 3 sets - 1 reps - 30 sec hold  Supine Lower Trunk Rotation - 1 x daily - 7 x weekly - 1 sets - 10 reps - 5 hold  Hooklying Clamshell with Resistance - 1 x daily - 7 x weekly - 2 sets - 10 reps  Supine March - 1 x daily - 7 x weekly - 2 sets - 10 reps      Treatment/Session Summary:    · Response to Treatment:  Patient responded well to therapy today, adjusting well to cues on technique and not having increased pain with exercises.  .  · Communication/Consultation:  None today  · Equipment provided today:  None today  · Recommendations/Intent for next treatment session: Next visit will focus on progressing with core/;lumbar stabilization exercises, with focus on flexion based exercises    Total Treatment Billable Duration:  45 minutes (45 min Therex)  PT Patient Time In/Time Out  Time In: 1030  Time Out: 1125    Mana Fountain, SPT    Future Appointments   Date Time Provider Sonia Martell   9/14/2021 10:30 AM Mcintosh Ted, PT [facility]   9/16/2021 10:30 AM Guerrero Love, PT [facility]   9/21/2021 10:30 AM Guerrero Loev, PT [facility]   9/23/2021 10:30 AM Guerrero Love, PT [facility]   9/28/2021 10:30 AM Guerrero Love, PT [facility]   9/30/2021 10:30 AM Chantelle Love, PT [facility]

## 2021-09-14 ENCOUNTER — HOSPITAL ENCOUNTER (OUTPATIENT)
Dept: PHYSICAL THERAPY | Age: 81
Discharge: HOME | End: 2021-09-14
Payer: MEDICARE

## 2021-09-14 PROCEDURE — 97110 THERAPEUTIC EXERCISES: CPT

## 2021-09-16 ENCOUNTER — HOSPITAL ENCOUNTER (OUTPATIENT)
Dept: PHYSICAL THERAPY | Age: 81
Discharge: HOME | End: 2021-09-16
Payer: MEDICARE

## 2021-09-16 PROCEDURE — 97110 THERAPEUTIC EXERCISES: CPT

## 2021-09-16 NOTE — PROGRESS NOTES
Becky Middleburg  : 1940  [place]  Secondary: [employer] [address] [facility]  at [address]  [facility] [address], [address], [place] [address]  [phone]   [phone]         OUTPATIENT PHYSICAL THERAPY:Progress Report 2021   ICD-10: Treatment Diagnosis:  Low back pain [M54.5]; Precautions/Allergies:   Sulfa (sulfonamide antibiotics) and Tape [adhesive]   TREATMENT PLAN:  Effective Dates: 8/10/2021 TO 2021 (90 days). Frequency/Duration: 2 times a week for 90 Day(s) MEDICAL/REFERRING DIAGNOSIS:  Low back pain [M54.5]  Other chronic pain [G89.29]     DATE OF ONSET: Chronic pain for 3 years  REFERRING PHYSICIAN: Sherri Hay,*  MD Orders: Eval and treat  Return MD Appointment: TBD     CURRENT ASSESSMENT:  Ms. Kuldeep Broussard  Reports improvement in pain overall;  4/10 pain level in her L thigh and lower back. Patient reports she was able to walk through her property on uneven terrain with not much increase in her pain. During reassessment she has shown improvement in strength and decrease in pain with ADL's and is progressing towards goals   PROBLEM LIST (Impacting functional limitations):  1. Decreased Strength  2. Decreased ADL/Functional Activities  3. Decreased Ambulation Ability/Technique  4. Increased Pain  5. Decreased Activity Tolerance  6. Decreased Flexibility/Joint Mobility  7. Decreased Bee with Home Exercise Program INTERVENTIONS PLANNED: (Treatment may consist of any combination of the following)  1. Gait Training  2. Home Exercise Program (HEP)  3. Manual Therapy  4. Range of Motion (ROM)  5. Therapeutic Exercise/Strengthening     GOALS: (Goals have been discussed and agreed upon with patient.)  Short-Term Functional Goals: Time Frame: 4-6 weeks  1. Pt will complete outdoor lawn care for one day with maximum 2/10 pain to demonstrate ability to complete ADLs (ONGOING)  2.  Pt will walk for 15 min without complaints of symptoms to demonstrate return independent ambulation (ONGOING)  3. Pt will improve in Modified Oswestry LBP questionnaire by 2-3 points (MET)  Discharge Goals: Time Frame: 6-8 weeks  1. Pt will complete all ADLs for one day without symptomatic pain to demonstrate return to PLOF (ONGING)  2. Pt will perform outdoor locomotion for one day without symptoms to demonstrate independent community ambulation (ONGOING)  8. Pt will improve in Modified Oswestry LBP questionnaire by 6-8 points (ONGOING)    OUTCOME MEASURE:   Tool Used: Modified Oswestry Low Back Pain Questionnaire  Score:  Initial: 10/50  Most Recent: 13/50 (Date: 9/16/21 )   Interpretation of Score: Each section is scored on a 0-5 scale, 5 representing the greatest disability. The scores of each section are added together for a total score of 50. UPDATED OBJECTIVE FINDINGS:    Multisegmental ROM Date:  8/10/21 Date:   9/16/21        Flexion 60 deg 65 deg   Extension 10 deg 12 deg   Rotation L 25%, P    Rotation R 25%    Side bend L 50%    Side bend R 50%       Hip ROM Date:  8/10/21       Right Left   Flexion 120 deg    Extension n/a n/a   Adduction P NP   Abduction 45 deg 45 deg     Knee ROM Date:  8/10/21       Right Left   Flexion WNL WNL   Extension Signs of  tightness Signs of tightness          Hip Strength Date:  8/10/21    Date:   9/16/21     Right Left Right Left   Flexion 4/4 4-/5, P 4+ 4-   Extension  4-/5, glute bridge 3+, glute bridge 3+, glute bridge   IR n/a n/a 4+ 4+   ER  4-/5 4+ 4+   Abduction  4-/5 4- 4+      Knee Strength Date:  8/10/21    Date:   9/16/21     Right Left Right Left   Flexion 4+/5 5 5- 5-   Extension 4+/5 4+/5 4+ 4+              MEDICAL NECESSITY:   · Patient is expected to demonstrate progress in strength, range of motion, balance and functional technique to increase independence with ADLs and community ambulation.   REASON FOR SERVICES/OTHER COMMENTS:  · Oumou [employer] will require continued skilled therapeutic intervention to address impairmants assessed to achieve return to PLOF. Total Duration:  5 minutes measurements       Rehabilitation Potential For Stated Goals: Good  Regarding Fredie Cornell therapy, I certify that the treatment plan above will be carried out by a therapist or under their direction.   Thank you for this referral,  Golden Pallas, SPT Rosalene Louis, MS, PT    Referring Physician Signature: Sherri Hay,* _______________________________ Date _____________

## 2021-09-16 NOTE — PROGRESS NOTES
Rodolfo Alcaraz  : 1940  [place]  Secondary: [address] [facility]  [address] , [address], [place] [address]  [phone]   [phone]      OUTPATIENT PHYSICAL THERAPY: Daily Treatment Note 2021  Visit Count:  10    ICD-10: Treatment Diagnosis:  Low back pain [M54.5]; Precautions/Allergies:   Sulfa (sulfonamide antibiotics) and Tape [adhesive]   TREATMENT PLAN:  Effective Dates: 8/10/2021 TO 2021 (90 days). Frequency/Duration: 2 times a week for 90 Day(s) MEDICAL/REFERRING DIAGNOSIS:  Low back pain [M54.5]  Other chronic pain [G89.29]     DATE OF ONSET: Chronic pain for 3 years  REFERRING PHYSICIAN: Tabitha Vieira,*  MD Orders: Candy Butler and treat  Return MD Appointment: TBD       Pre-treatment Symptoms/Complaints: Patient reports to therapy with 4/10 pain level in her L thigh and lower back. Patient reports she was able to walk through her property on uneven terrain with not much increase in her pain level. Pain: 4/10 Post Session: 2/10    Medications Last Reviewed:  2021  Updated Objective Findings:  See evaluation note from today. TREATMENT:     THERAPEUTIC EXERCISE: (40 minutes):  Exercises per grid below to improve mobility and strength. Required minimal visual and verbal cues to promote proper body posture and promote proper body mechanics. Progressed resistance, repetitions and complexity of movement as indicated.    Date:  8/10/21 Date:  21 Date:  21 Date:   21 Date:   21 Date:   21 Date:  21 Date:   21 Date:   21 Date:   21   Activity/Exercise Parameters Parameters Parameters Parameters         Education Discussed HEP  Educated on sleep posture, shoe inserts, and icing for pain modulation and decrease swelling HEP updated and revised  HEP updated Reviewed exercises performed at home given by other practiotioners        SKC 20 sec, each 2 x 20 secs B 2 x 30 secs B 2 x 20 secs B  2 x 20 secs B 2 x 20 sec B 2 x 20 sec; B 2 x 20 sec B 2 x 20 sec B  2 x 20 sec B   DKC on ball      10x  10x 10x 10x 10x   Seated lumbar flexion     8x with green ball  3 x 15 secs center/oblique With red SB assist 3 x 15s each With red SB 10x each center/oblique With red SB 10x each center/oblique    Seated flexion stretch     5x B reach to opposite side          Knee/hip rocks 5x each    5x 10x 10x 10x 10x 10x   Pelvic tilts 5x 10x 2 x10    10x 5x 5x    Nustep  8' level 1 8' level 1 7' level 1  7' Level 1 8' level 1 8' level 2 8' level 2 8' level 2.5 8' level 2.5   TA bracing  8' with cues           Hip adduction  With ball 2 x 10 With ball 2 x 10 2  X 10 with ball         Hip abduction  2 x 10 lime TB 1 x 10 lime TB 2 x 10 with orange TB         Bridges  2 x 5    1 x 10       Clamshell (Hooklying)    2 x 10 with orange TB  2 x 10 1 x 10; B 2 x 10     Clamshell (sidelying)          2 x 10 B lime TB 1 x 10 B lime TB   SLR     1 x 10 B 2 x 10 B 2 x 10; B 2 x 10 1 x 10 B    Supine marching    2 x 10 2 x 10  2 x 10  2 x 10  2 x 10 1 x 10     Piriformis stretch    2 x 20 sec w PT  2 x 20 sec         Standing marching       10x on airex        Standing abduction       1 x 10 B on airex   1 x 10 B on airex 2 x 10 B on airex 1 x 10 B on airex   Isometric hip flexion       10x hold 3 secs      Hip extension leaning over table          2 x 10 B   MANUAL THERAPY: (0 minutes): Joint mobilization was utilized and necessary because of the patient's restricted joint motion. Long axis distraction of R hip (not performed today)    MOIST HOT PACK: (0 mins)  For relaxation and pain relief (no charge)  ICE: (0 minutes) for pain and analgesia (no charge) (not performed today)    [employer] Portal  Access Code: [email or web address]  URL: [email or web address]  Date: 08/31/2021  Prepared by: Alvina Awan    Exercises  Seated Flexion Stretch - 1 x daily - 7 x weekly - 1 sets - 5 reps - 20 hold  Hooklying Single Knee to Chest Stretch - 2 x daily - 7 x weekly - 3 sets - 1 reps - 30 sec hold  Supine Lower Trunk Rotation - 1 x daily - 7 x weekly - 1 sets - 10 reps - 5 hold  Hooklying Clamshell with Resistance - 1 x daily - 7 x weekly - 2 sets - 10 reps  Supine March - 1 x daily - 7 x weekly - 2 sets - 10 reps      Treatment/Session Summary:    · Response to Treatment:  Patient did well with today's exercises with not much increase in her pain level. Patient's quality of movement of therapeutic exercises has improved since starting therapy.  .  · Communication/Consultation:  None today  · Equipment provided today:  None today  · Recommendations/Intent for next treatment session: Next visit will focus on progressing with core/;lumbar stabilization exercises, with focus on flexion based exercises    Total Treatment Billable Duration:  45 minutes (40 mins therapeutic exercise, 5 minutes measurements)  PT Patient Time In/Time Out  Time In: 1030  Time Out: 1120    Mana Fountain, SPT    Future Appointments   Date Time Provider Sonia Martell   9/21/2021 10:30 AM Dayna Jean, PT [facility]   9/23/2021 10:30 AM Guerrero Love, PT [facility]   9/28/2021 10:30 AM Guerrero Love, PT [facility]   9/30/2021 10:30 AM Chantelle Love, PT [facility]

## 2021-09-21 ENCOUNTER — HOSPITAL ENCOUNTER (OUTPATIENT)
Dept: PHYSICAL THERAPY | Age: 81
Discharge: HOME | End: 2021-09-21
Payer: MEDICARE

## 2021-09-21 PROCEDURE — 97110 THERAPEUTIC EXERCISES: CPT

## 2021-09-21 NOTE — PROGRESS NOTES
Luis Fernando Rosado  : 1940  [place]  Secondary: [address] at [facility]  [address] , [address], [place] [address]  [phone]   [phone]      OUTPATIENT PHYSICAL THERAPY: Daily Treatment Note 2021  Visit Count:  11    ICD-10: Treatment Diagnosis:  Low back pain [M54.5]; Precautions/Allergies:   Sulfa (sulfonamide antibiotics) and Tape [adhesive]   TREATMENT PLAN:  Effective Dates: 8/10/2021 TO 2021 (90 days). Frequency/Duration: 2 times a week for 90 Day(s) MEDICAL/REFERRING DIAGNOSIS:  Low back pain [M54.5]  Other chronic pain [G89.29]     DATE OF ONSET: Chronic pain for 3 years  REFERRING PHYSICIAN: Zelalem Barker,*  MD Orders: Hiro Toro and treat  Return MD Appointment: TBD       Pre-treatment Symptoms/Complaints: Patient reports to therapy with 2/10 pain and states she has not done much activity these past few days. Pain: 2/10 Post Session: 210    Medications Last Reviewed:  2021  Updated Objective Findings:  None today. TREATMENT:     THERAPEUTIC EXERCISE: (45 minutes):  Exercises per grid below to improve mobility, strength and balance. Required minimal visual and verbal cues to promote proper body posture and promote proper body mechanics. Progressed resistance, repetitions and complexity of movement as indicated.    Date:   21 Date:   21 Date:  21 Date:   21 Date:   21 Date:   21 Date:   21   Activity/Exercise          Education Reviewed exercises performed at home given by other practiotioners         SKC 2 x 20 secs B 2 x 20 sec B 2 x 20 sec; B 2 x 20 sec B 2 x 20 sec B  2 x 20 sec B 2 x 20 sec B   DKC on ball  10x  10x 10x 10x 10x 10x   Seated lumbar flexion   3 x 15 secs center/oblique With red SB assist 3 x 15s each With red SB 10x each center/oblique With red SB 10x each center/oblique  With green SB 10x each center/oblique   Knee/hip rocks 5x 10x 10x 10x 10x 10x 10x   Pelvic tilts   10x 5x 5x     Nustep 7' Level 1 8' level 1 8' level 2 8' level 2 8' level 2.5 8' level 2.5 10' level 3   TA bracing          Hip adduction          Hip abduction          Bridges  1 x 10        Clamshell (Hooklying)  2 x 10 1 x 10; B 2 x 10      Clamshell (sidelying)      2 x 10 B lime TB 1 x 10 B lime TB 2 x 10 B lime TB   SLR 1 x 10 B 2 x 10 B 2 x 10; B 2 x 10 1 x 10 B  1 x 10 B with 1# ankle weight    Supine marching   2 x 10  2 x 10  2 x 10 1 x 10      Piriformis stretch  2 x 20 sec          Standing marching   10x on airex         Standing abduction   1 x 10 B on airex   1 x 10 B on airex 2 x 10 B on airex 1 x 10 B on airex 1 x 10 B on airex   Isometric hip flexion   10x hold 3 secs       Hip extension leaning over table      2 x 10 B 2 x 10; B   Heel raises       2 x 10 on airex   MANUAL THERAPY: (0 minutes): Joint mobilization was utilized and necessary because of the patient's restricted joint motion. Long axis distraction of R hip (not performed today)    MOIST HOT PACK: (0 mins)  For relaxation and pain relief (no charge)  ICE: (0 minutes) for pain and analgesia (no charge) (not performed today)    [employer] Portal  Access Code: [email or web address]  URL: [email or web address]  Date: 08/31/2021  Prepared by: Jannette Schaumann    Exercises  Seated Flexion Stretch - 1 x daily - 7 x weekly - 1 sets - 5 reps - 20 hold  Hooklying Single Knee to Chest Stretch - 2 x daily - 7 x weekly - 3 sets - 1 reps - 30 sec hold  Supine Lower Trunk Rotation - 1 x daily - 7 x weekly - 1 sets - 10 reps - 5 hold  Hooklying Clamshell with Resistance - 1 x daily - 7 x weekly - 2 sets - 10 reps  Supine March - 1 x daily - 7 x weekly - 2 sets - 10 reps      Treatment/Session Summary:    · Response to Treatment:  Tolerated treatment well, but had some discomfort with weighted SLR .   · Communication/Consultation:  None today  · Equipment provided today:  None today  · Recommendations/Intent for next treatment session: Next visit will focus on progressing with core/;lumbar stabilization exercises, with focus on flexion based exercises    Total Treatment Billable Duration:  45 minutes (45 mins therapeutic exercise)  PT Patient Time In/Time Out  Time In: 1030  Time Out: 1115    Torri Cain, PT    Future Appointments   Date Time Provider Sonia Martell   9/23/2021 10:30 AM [facility]   9/28/2021 10:30 AM [facility]   9/30/2021 10:30 AM Hugh Love, PT [facility]

## 2021-09-23 ENCOUNTER — HOSPITAL ENCOUNTER (OUTPATIENT)
Dept: PHYSICAL THERAPY | Age: 81
Discharge: HOME | End: 2021-09-23
Payer: MEDICARE

## 2021-09-23 PROCEDURE — 97110 THERAPEUTIC EXERCISES: CPT

## 2021-09-23 NOTE — PROGRESS NOTES
Rodolfo Alcaraz  : 1940  [place]  Secondary: [address] [facility] [address] [facility]  [address] , [address], [place] [address]  [phone]   [phone]      OUTPATIENT PHYSICAL THERAPY: Daily Treatment Note 2021  Visit Count:  12    ICD-10: Treatment Diagnosis:  Low back pain [M54.5]; Precautions/Allergies:   Sulfa (sulfonamide antibiotics) and Tape [adhesive]   TREATMENT PLAN:  Effective Dates: 8/10/2021 TO 2021 (90 days). Frequency/Duration: 2 times a week for 90 Day(s) MEDICAL/REFERRING DIAGNOSIS:  Low back pain [M54.5]  Other chronic pain [G89.29]     DATE OF ONSET: Chronic pain for 3 years  REFERRING PHYSICIAN: Tabitha Vieira,*  MD Orders: Candy Butler and treat  Return MD Appointment: TBD       Pre-treatment Symptoms/Complaints: Patient reports to therapy with a 6/10 pain in her R hip despite not participating in any out of the ordinary physical activities lately. Patient reports that her back is doing well today. Patient also reports slight complaints with her R knee and shoulder. Pain: 6/10 Post Session: 2/10    Medications Last Reviewed:  2021  Updated Objective Findings:  None today. TREATMENT:     THERAPEUTIC EXERCISE: (45 minutes):  Exercises per grid below to improve mobility, strength and balance. Required minimal visual and verbal cues to promote proper body posture and promote proper body mechanics. Progressed resistance, repetitions and complexity of movement as indicated.    Date:  21 Date:   21 Date:   21 Date:   21 Date:   21 Date:   21   Activity/Exercise         Education         SKC 2 x 20 sec; B 2 x 20 sec B 2 x 20 sec B  2 x 20 sec B 2 x 20 sec B 2 x 20 sec B    DKC on ball 10x 10x 10x 10x 10x 10x   Seated lumbar flexion  With red SB assist 3 x 15s each With red SB 10x each center/oblique With red SB 10x each center/oblique  With green SB 10x each center/oblique With green SB 10x each center/oblique   Knee/hip rocks 10x 10x 10x 10x 10x    Pelvic tilts 10x 5x 5x      Nustep 8' level 2 8' level 2 8' level 2.5 8' level 2.5 10' level 3 8' level 2.5   Bridges         Clamshell (Hooklying) 1 x 10; B 2 x 10       Clamshell (sidelying)    2 x 10 B lime TB 1 x 10 B lime TB 2 x 10 B lime TB 1 x 10 B lime TB   SLR 2 x 10; B 2 x 10 1 x 10 B  1 x 10 B with 1# ankle weight  2 x 10 B without weight   Supine marching  2 x 10  2 x 10 1 x 10    2 x 10   Piriformis stretch       4 x 20 secs modified   Standing marching          Standing abduction   1 x 10 B on airex 2 x 10 B on airex 1 x 10 B on airex 1 x 10 B on airex 2 x 10 B on airex   Isometric hip flexion 10x hold 3 secs        Hip extension leaning over table    2 x 10 B 2 x 10; B 2 x 10; B with wedge   Heel raises     2 x 10 on airex    MANUAL THERAPY: (5 minutes): Joint mobilization and Soft tissue mobilization was utilized and necessary because of the patient's restricted joint motion and restricted motion of soft tissue. Long axis distraction of R hip- aggravating  STM/MFR right piriformis/glut    MOIST HOT PACK: (0 mins)  For relaxation and pain relief (no charge)  ICE: (0 minutes) for pain and analgesia (no charge) (not performed today)    [employer] Portal  Access Code: [email or web address]  URL: [email or web address]  Date: 08/31/2021  Prepared by: Patrice Parra    Exercises  Seated Flexion Stretch - 1 x daily - 7 x weekly - 1 sets - 5 reps - 20 hold  Hooklying Single Knee to Chest Stretch - 2 x daily - 7 x weekly - 3 sets - 1 reps - 30 sec hold  Supine Lower Trunk Rotation - 1 x daily - 7 x weekly - 1 sets - 10 reps - 5 hold  Hooklying Clamshell with Resistance - 1 x daily - 7 x weekly - 2 sets - 10 reps  Supine March - 1 x daily - 7 x weekly - 2 sets - 10 reps      Treatment/Session Summary:    · Response to Treatment:  Patient reported increased pain with long axis distraction of her R hip. Patient reported feeling a stretch of the piriformis with many stretches but not increased pain.  .  · Communication/Consultation:  None today  · Equipment provided today:  None today  · Recommendations/Intent for next treatment session: Next visit will focus on progressing with core/;lumbar stabilization exercises, with focus on flexion based exercises    Total Treatment Billable Duration:  45 minutes (45 mins therapeutic exercise)       Chandler Ball PT    Future Appointments   Date Time Provider Sonia Martell   9/28/2021 10:30 AM Gertrudis Love, [facility]   9/30/2021 10:30 AM [facility], [facility]

## 2021-09-28 ENCOUNTER — HOSPITAL ENCOUNTER (OUTPATIENT)
Dept: PHYSICAL THERAPY | Age: 81
Discharge: HOME | End: 2021-09-28
Payer: MEDICARE

## 2021-09-28 NOTE — PROGRESS NOTES
Sukhjinder Loser  : 1940  [place]  Secondary: Bsi Generic [address] [facility]  at [facility], [address], [place] [address]  [phone]   [phone]      OUTPATIENT DAILY NOTE    NAME/AGE/GENDER: Sukhjinder Luna is a 80 y.o. female. DATE: 2021    Ms. Echevarria Troyocks for today's appointment due to conflict.     Dilip Shaw, PT   Future Appointments   Date Time Provider Sonia Alonsoisti   2021  7:00 PM Beverly Tan, [facility]   2021 10:30 AM [facility]

## 2021-09-30 ENCOUNTER — HOSPITAL ENCOUNTER (OUTPATIENT)
Dept: PHYSICAL THERAPY | Age: 81
Discharge: HOME | End: 2021-09-30
Payer: MEDICARE

## 2021-09-30 NOTE — PROGRESS NOTES
Daniella Mccormick  : 1940  [place]  Secondary: Bshsi Generic [address] [facility]  at [facility]  [facility] [address], [place] [address]  [phone]   [phone]      OUTPATIENT DAILY NOTE    NAME/AGE/GENDER: Daniella Mccormick is a 80 y.o. female. DATE: 2021    Ms. Dante Christin for today's appointment due to conflict.     Kiah Escalona, PT   Future Appointments   Date Time Provider Sonia Martell   2021  7:00 PM Lita Love, [facility]

## 2021-10-01 NOTE — THERAPY DISCHARGE
Bola Nugent  : 1940  [place]  Secondary: Bshsi Generic [address] at [facility]  [address], [address], [place] [address]  [phone]   [phone]         OUTPATIENT PHYSICAL THERAPY:Discharge Summary 2021   ICD-10: Treatment Diagnosis:  Low back pain [M54.5]; Precautions/Allergies:   Sulfa (sulfonamide antibiotics) and Tape [adhesive]   TREATMENT PLAN:  Effective Dates: 8/10/2021 TO 2021 (90 days). Frequency/Duration: 2 times a week for 90 Day(s) MEDICAL/REFERRING DIAGNOSIS:  Low back pain [M54.5]  Other chronic pain [G89.29]     DATE OF ONSET: Chronic pain for 3 years  REFERRING PHYSICIAN: Ramírez Smith,*  MD Orders: Danay Sotomayor and treat  Return MD Appointment: TBD   As of 2021, Bola Nugent has attended 12 visits out of 14 scheduled visits, with 2 cancellation(s) and 0 no shows. SETH ASSESSMENT:  Ms. Emperatriz Ross since last report continued to have improvement in pain and performance of ADL's . Patient was not seen for last 2 scheduled visits and unable to reassess formally   1.  1.      GOALS: (Goals have been discussed and agreed upon with patient.)  Short-Term Functional Goals: Time Frame: 4-6 weeks  2. Pt will complete outdoor lawn care for one day with maximum 2/10 pain to demonstrate ability to complete ADLs (MET)  3. Pt will walk for 15 min without complaints of symptoms to demonstrate return independent ambulation (MET)  4. Pt will improve in Modified Oswestry LBP questionnaire by 2-3 points (MET)  Discharge Goals: Time Frame: 6-8 weeks  1. Pt will complete all ADLs for one day without symptomatic pain to demonstrate return to PLOF (NOT MET)  2. Pt will perform outdoor locomotion for one day without symptoms to demonstrate independent community ambulation (MET)  2.  Pt will improve in Modified Oswestry LBP questionnaire by 6-8 points (NOT MET)    OUTCOME MEASURE:   Tool Used: Modified Oswestry Low Back Pain Questionnaire  Score:  Initial: 10/50  Most Recent: 13/50 (Date: 9/16/21 )   Interpretation of Score: Each section is scored on a 0-5 scale, 5 representing the greatest disability. The scores of each section are added together for a total score of 50. UPDATED OBJECTIVE FINDINGS:    Multisegmental ROM Date:  8/10/21 Date:   9/16/21        Flexion 60 deg 65 deg   Extension 10 deg 12 deg   Rotation L 25%, P    Rotation R 25%    Side bend L 50%    Side bend R 50%       Hip ROM Date:  8/10/21       Right Left   Flexion 120 deg    Extension n/a n/a   Adduction P NP   Abduction 45 deg 45 deg     Knee ROM Date:  8/10/21       Right Left   Flexion WNL WNL   Extension Signs of  tightness Signs of tightness          Hip Strength Date:  8/10/21    Date:   9/16/21     Right Left Right Left   Flexion 4/4 4-/5, P 4+ 4-   Extension  4-/5, glute bridge 3+, glute bridge 3+, glute bridge   IR n/a n/a 4+ 4+   ER  4-/5 4+ 4+   Abduction  4-/5 4- 4+      Knee Strength Date:  8/10/21    Date:   9/16/21     Right Left Right Left   Flexion 4+/5 5 5- 5-   Extension 4+/5 4+/5 4+ 4+            Thank you for this referral,  Guerrero Love, PT     Isaias Harmon, MS, PT    Referring Physician Signature: Chetan Barth,* No Signature is Required for this note.

## 2022-03-19 PROBLEM — Z96.651 S/P TOTAL KNEE ARTHROPLASTY, RIGHT: Status: ACTIVE | Noted: 2018-08-24

## 2024-01-25 ENCOUNTER — APPOINTMENT (RX ONLY)
Dept: URBAN - METROPOLITAN AREA CLINIC 24 | Facility: CLINIC | Age: 84
Setting detail: DERMATOLOGY
End: 2024-01-25

## 2024-01-25 DIAGNOSIS — L82.1 OTHER SEBORRHEIC KERATOSIS: ICD-10-CM

## 2024-01-25 DIAGNOSIS — L44.8 OTHER SPECIFIED PAPULOSQUAMOUS DISORDERS: ICD-10-CM

## 2024-01-25 DIAGNOSIS — H61.03 CHONDRITIS OF EXTERNAL EAR: ICD-10-CM

## 2024-01-25 DIAGNOSIS — L73.8 OTHER SPECIFIED FOLLICULAR DISORDERS: ICD-10-CM

## 2024-01-25 DIAGNOSIS — L72.0 EPIDERMAL CYST: ICD-10-CM

## 2024-01-25 DIAGNOSIS — L40.8 OTHER PSORIASIS: ICD-10-CM | Status: INADEQUATELY CONTROLLED

## 2024-01-25 DIAGNOSIS — Z71.89 OTHER SPECIFIED COUNSELING: ICD-10-CM

## 2024-01-25 DIAGNOSIS — D22 MELANOCYTIC NEVI: ICD-10-CM

## 2024-01-25 PROBLEM — D22.72 MELANOCYTIC NEVI OF LEFT LOWER LIMB, INCLUDING HIP: Status: ACTIVE | Noted: 2024-01-25

## 2024-01-25 PROBLEM — H61.032 CHONDRITIS OF LEFT EXTERNAL EAR: Status: ACTIVE | Noted: 2024-01-25

## 2024-01-25 PROCEDURE — ? OTC TREATMENT REGIMEN

## 2024-01-25 PROCEDURE — ? PRESCRIPTION MEDICATION MANAGEMENT

## 2024-01-25 PROCEDURE — ? COUNSELING

## 2024-01-25 PROCEDURE — ? PRESCRIPTION

## 2024-01-25 PROCEDURE — 99204 OFFICE O/P NEW MOD 45 MIN: CPT

## 2024-01-25 RX ORDER — CLOBETASOL PROPIONATE 0.5 MG/ML
SOLUTION TOPICAL
Qty: 50 | Refills: 6 | Status: ERX | COMMUNITY
Start: 2024-01-25

## 2024-01-25 RX ADMIN — CLOBETASOL PROPIONATE: 0.5 SOLUTION TOPICAL at 00:00

## 2024-01-25 ASSESSMENT — LOCATION SIMPLE DESCRIPTION DERM
LOCATION SIMPLE: LEFT EAR
LOCATION SIMPLE: SUBMENTAL CHIN
LOCATION SIMPLE: RIGHT UPPER BACK
LOCATION SIMPLE: SCALP
LOCATION SIMPLE: LEFT CHEEK
LOCATION SIMPLE: LEFT EYEBROW
LOCATION SIMPLE: LEFT PRETIBIAL REGION

## 2024-01-25 ASSESSMENT — LOCATION ZONE DERM
LOCATION ZONE: EAR
LOCATION ZONE: FACE
LOCATION ZONE: LEG
LOCATION ZONE: TRUNK
LOCATION ZONE: SCALP

## 2024-01-25 ASSESSMENT — LOCATION DETAILED DESCRIPTION DERM
LOCATION DETAILED: SUBMENTAL CHIN
LOCATION DETAILED: LEFT SUPERIOR CENTRAL MALAR CHEEK
LOCATION DETAILED: LEFT SUPERIOR FRONTAL SCALP
LOCATION DETAILED: LEFT MEDIAL DISTAL PRETIBIAL REGION
LOCATION DETAILED: LEFT DISTAL PRETIBIAL REGION
LOCATION DETAILED: LEFT ANTIHELIX
LOCATION DETAILED: LEFT CENTRAL EYEBROW
LOCATION DETAILED: RIGHT MID-UPPER BACK

## 2024-01-25 NOTE — HPI: RASH
How Severe Is Your Rash?: mild
Is This A New Presentation, Or A Follow-Up?: Rash
Additional History: Previously diagnosed as sebopsoriasis

## 2024-08-07 ENCOUNTER — APPOINTMENT (RX ONLY)
Dept: URBAN - METROPOLITAN AREA CLINIC 25 | Facility: CLINIC | Age: 84
Setting detail: DERMATOLOGY
End: 2024-08-07

## 2024-08-07 DIAGNOSIS — L57.0 ACTINIC KERATOSIS: ICD-10-CM

## 2024-08-07 DIAGNOSIS — L40.8 OTHER PSORIASIS: ICD-10-CM | Status: WELL CONTROLLED

## 2024-08-07 PROCEDURE — 99213 OFFICE O/P EST LOW 20 MIN: CPT | Mod: 25

## 2024-08-07 PROCEDURE — ? COUNSELING

## 2024-08-07 PROCEDURE — ? PRESCRIPTION MEDICATION MANAGEMENT

## 2024-08-07 PROCEDURE — ? LIQUID NITROGEN

## 2024-08-07 PROCEDURE — 17000 DESTRUCT PREMALG LESION: CPT

## 2024-08-07 PROCEDURE — 17003 DESTRUCT PREMALG LES 2-14: CPT

## 2024-08-07 ASSESSMENT — ITCH NUMERIC RATING SCALE: HOW SEVERE IS YOUR ITCHING?: 0

## 2024-08-07 ASSESSMENT — SEVERITY ASSESSMENT: HOW SEVERE IS THIS PATIENT'S CONDITION?: MODERATE

## 2024-08-07 ASSESSMENT — LOCATION SIMPLE DESCRIPTION DERM: LOCATION SIMPLE: SCALP

## 2024-08-07 ASSESSMENT — LOCATION DETAILED DESCRIPTION DERM: LOCATION DETAILED: LEFT SUPERIOR FRONTAL SCALP

## 2024-08-07 ASSESSMENT — LOCATION ZONE DERM: LOCATION ZONE: SCALP

## 2024-09-05 ENCOUNTER — APPOINTMENT (RX ONLY)
Dept: URBAN - METROPOLITAN AREA CLINIC 24 | Facility: CLINIC | Age: 84
Setting detail: DERMATOLOGY
End: 2024-09-05

## 2024-09-05 DIAGNOSIS — L57.8 OTHER SKIN CHANGES DUE TO CHRONIC EXPOSURE TO NONIONIZING RADIATION: ICD-10-CM | Status: INADEQUATELY CONTROLLED

## 2024-09-05 PROCEDURE — 99214 OFFICE O/P EST MOD 30 MIN: CPT

## 2024-09-05 PROCEDURE — ? PRESCRIPTION

## 2024-09-05 PROCEDURE — ? PRESCRIPTION MEDICATION MANAGEMENT

## 2024-09-05 PROCEDURE — ? COUNSELING

## 2024-09-05 RX ORDER — FLUOROURACIL 5 MG/G
CREAM TOPICAL
Qty: 40 | Refills: 2 | Status: ERX | COMMUNITY
Start: 2024-09-05

## 2024-09-05 RX ADMIN — FLUOROURACIL: 5 CREAM TOPICAL at 00:00

## 2024-09-05 ASSESSMENT — LOCATION DETAILED DESCRIPTION DERM: LOCATION DETAILED: LEFT SUPERIOR FOREHEAD

## 2024-09-05 ASSESSMENT — LOCATION ZONE DERM: LOCATION ZONE: FACE

## 2024-09-05 ASSESSMENT — LOCATION SIMPLE DESCRIPTION DERM: LOCATION SIMPLE: LEFT FOREHEAD

## 2024-09-05 NOTE — PROCEDURE: PRESCRIPTION MEDICATION MANAGEMENT
Initiate Treatment: fluorouracil 5 % topical cream \\nApply a thin layer to spot on left side of scalp/hairline 2- daily for 10 days.
Render In Strict Bullet Format?: No
Detail Level: Zone

## 2024-12-05 ENCOUNTER — APPOINTMENT (OUTPATIENT)
Dept: URBAN - METROPOLITAN AREA CLINIC 24 | Facility: CLINIC | Age: 84
Setting detail: DERMATOLOGY
End: 2024-12-05

## 2024-12-05 DIAGNOSIS — L57.8 OTHER SKIN CHANGES DUE TO CHRONIC EXPOSURE TO NONIONIZING RADIATION: ICD-10-CM | Status: IMPROVED

## 2024-12-05 PROCEDURE — ? ADDITIONAL NOTES

## 2024-12-05 PROCEDURE — 99213 OFFICE O/P EST LOW 20 MIN: CPT

## 2024-12-05 PROCEDURE — ? PRESCRIPTION MEDICATION MANAGEMENT

## 2024-12-05 PROCEDURE — ? COUNSELING

## 2024-12-05 ASSESSMENT — LOCATION ZONE DERM: LOCATION ZONE: FACE

## 2024-12-05 ASSESSMENT — LOCATION DETAILED DESCRIPTION DERM: LOCATION DETAILED: LEFT SUPERIOR FOREHEAD

## 2024-12-05 ASSESSMENT — LOCATION SIMPLE DESCRIPTION DERM: LOCATION SIMPLE: LEFT FOREHEAD

## 2024-12-05 NOTE — PROCEDURE: ADDITIONAL NOTES
Render Risk Assessment In Note?: no
Additional Notes: Significant improvement is noted with no residual actinic damage.
Detail Level: Simple

## 2024-12-05 NOTE — PROCEDURE: PRESCRIPTION MEDICATION MANAGEMENT
Discontinue Regimen: fluorouracil 5 % topical cream \\nApply a thin layer to spot on left side of scalp/hairline 2- daily for 10 days.
Render In Strict Bullet Format?: No
Detail Level: Zone

## 2025-03-13 ENCOUNTER — APPOINTMENT (OUTPATIENT)
Dept: URBAN - METROPOLITAN AREA CLINIC 24 | Facility: CLINIC | Age: 85
Setting detail: DERMATOLOGY
End: 2025-03-13

## 2025-03-13 DIAGNOSIS — L82.1 OTHER SEBORRHEIC KERATOSIS: ICD-10-CM

## 2025-03-13 PROCEDURE — ? COUNSELING

## 2025-03-13 PROCEDURE — 99212 OFFICE O/P EST SF 10 MIN: CPT

## 2025-03-13 ASSESSMENT — LOCATION DETAILED DESCRIPTION DERM: LOCATION DETAILED: RIGHT MID-UPPER BACK

## 2025-03-13 ASSESSMENT — LOCATION ZONE DERM: LOCATION ZONE: TRUNK

## 2025-03-13 ASSESSMENT — LOCATION SIMPLE DESCRIPTION DERM: LOCATION SIMPLE: RIGHT UPPER BACK

## 2025-05-13 NOTE — PROGRESS NOTES
Detailed Plan for Today's Visit (Testing, Therapy, Medications, Interventions, Consults, and Follow up):  Lumbar central canal stenosis  Spent time discussing patient condition with possible treatment with decompressive device.  She reports time for consideration.  Status post interlaminar L4-5 epidural steroid injection  Status post M ILD L3-4  Status post lumbar L3-4 arthrodesis with bone grafting  Not a surgical candidate per recommendation Dr. Miller  Continue gabapentin 300 mg 3 times daily  Refer to physical therapy [facility] in our office, to begin next week at earliest  Provided patient with a 10-day short course of tramadol to be utilized sparingly  Lumbar spondylosis without myelopathy  Status post b/l L4-SA MBB  Chronic low back pain  Encourage continuation of active other lifestyle      Previous encounter on 1/17/2024  Patient returns for follow-up and treatment regarding lumbar pain with chronic low back pain. Patient returns again for second follow-up after undergoing lumbar L3-4 arthrodesis on 8/21/2023. Once again she is reporting no benefit from surgery or at the very least persistent pain. In the same encounter she informed that she is now standing and functioning for at minimum an hour or more without experiencing significant pain. Pain remains in low back bilateral radiating to her hips. I reminded her that she was unable to perform this amount of function and activity and would present to my office regularly in tears due to pain. At this time she is in no pain and appears to be in good spirits. We discussed the likelihood of this being continued neuropathic pain and the potential for spinal questioner trial in future pending patient's outlook.           Previous interventions:  Procedure Date Results   Lumbar CECELIA 3 4 interlaminar 9/12/2022 1 week complete resolution followed by return to baseline   M ILD L3-4 11/14/2022 Less vigorous and improvement   Bilateral L4 through SA MBB 1/26/2023 No

## 2025-05-30 ENCOUNTER — OFFICE VISIT (OUTPATIENT)
Age: 85
End: 2025-05-30
Payer: MEDICARE

## 2025-05-30 DIAGNOSIS — M79.641 HAND PAIN, RIGHT: ICD-10-CM

## 2025-05-30 DIAGNOSIS — M48.062 LUMBAR STENOSIS WITH NEUROGENIC CLAUDICATION: Primary | ICD-10-CM

## 2025-05-30 PROCEDURE — 1090F PRES/ABSN URINE INCON ASSESS: CPT | Performed by: ANESTHESIOLOGY

## 2025-05-30 PROCEDURE — G8428 CUR MEDS NOT DOCUMENT: HCPCS | Performed by: ANESTHESIOLOGY

## 2025-05-30 PROCEDURE — 99213 OFFICE O/P EST LOW 20 MIN: CPT | Performed by: ANESTHESIOLOGY

## 2025-05-30 PROCEDURE — G8400 PT W/DXA NO RESULTS DOC: HCPCS | Performed by: ANESTHESIOLOGY

## 2025-05-30 PROCEDURE — 1123F ACP DISCUSS/DSCN MKR DOCD: CPT | Performed by: ANESTHESIOLOGY

## 2025-05-30 PROCEDURE — 4004F PT TOBACCO SCREEN RCVD TLK: CPT | Performed by: ANESTHESIOLOGY

## 2025-05-30 PROCEDURE — G8421 BMI NOT CALCULATED: HCPCS | Performed by: ANESTHESIOLOGY

## 2025-05-30 RX ORDER — GABAPENTIN 300 MG/1
300 CAPSULE ORAL 2 TIMES DAILY
Qty: 60 CAPSULE | Refills: 2 | Status: SHIPPED | OUTPATIENT
Start: 2025-05-30 | End: 2025-08-28

## 2025-05-30 NOTE — PROGRESS NOTES
Chronic Pain Consult Note      Plan:     A comprehensive pain management plan may consist of the following: Testing, Therapy, Medications, Interventions, Consults, and Follow up.    Detailed Plan for Today's Visit (Testing, Therapy, Medications, Interventions, Consults, and Follow up):  Chronic right hand pain, MCP and PIP  Patient presents with arthritic joints of the right hand.  She is noting decreased ability to perform activities as closure of her hand is becoming more difficult as well as painful.  She takes OTC medications as received General Therapy without benefit.  Referral to POA hand team  Lumbar central canal stenosis  Order repeat lumbar MRI without  Status post interlaminar L4-5 epidural steroid injection  Status post M ILD L3-4  Status post lumbar L3-4 arthrodesis with bone grafting  Not a surgical candidate per recommendation Dr. Millre  Continue gabapentin 300 mg 2 times daily  Status post physical therapy with [facility]  Lumbar spondylosis without myelopathy  Status post b/l L4-SA MBB  Chronic low back pain  Encourage continuation of active other lifestyle      General Recommendations: The pain condition that the patient suffers from is best treated with a multidisciplinary approach that involves an increase in physical activity to prevent de-conditioning and worsening of the pain cycle, as well as psychological counseling (formal and/or informal) to address the co morbid psychological effects of pain. Treatment will often involve judicious use of pain medications and interventional procedures to decrease the pain, allowing the patient to participate in the physical activity that will ultimately produce long-lasting pain reductions. The goal of the multidisciplinary approach is to return the patient to a higher level of overall function and to restore their ability to perform activities of daily living.      Referring Provider: No ref. provider found  Assessment:      Chief Complaint: No chief

## 2025-05-30 NOTE — PATIENT INSTRUCTIONS
MRI lumbar spine ordered. Radiology scheduling [phone]. Once MRI is scheduled, call our office [phone] and schedule a follow up for one week later.     Referral to [facility] [phone]

## 2025-06-06 ENCOUNTER — OFFICE VISIT (OUTPATIENT)
Age: 85
End: 2025-06-06

## 2025-06-06 DIAGNOSIS — M79.641 PAIN OF RIGHT HAND: Primary | ICD-10-CM

## 2025-06-06 DIAGNOSIS — M18.11 PRIMARY OSTEOARTHRITIS OF FIRST CARPOMETACARPAL JOINT OF RIGHT HAND: ICD-10-CM

## 2025-06-06 DIAGNOSIS — M19.041 DEGENERATIVE ARTHRITIS OF METACARPOPHALANGEAL JOINT OF INDEX FINGER OF RIGHT HAND: ICD-10-CM

## 2025-06-06 DIAGNOSIS — M15.2 DEGENERATIVE ARTHRITIS OF PROXIMAL INTERPHALANGEAL JOINT OF MIDDLE FINGER OF RIGHT HAND: ICD-10-CM

## 2025-06-06 RX ORDER — DICLOFENAC SODIUM 30 MG/G
2 GEL TOPICAL 3 TIMES DAILY PRN
Qty: 100 G | Refills: 0 | Status: SHIPPED | OUTPATIENT
Start: 2025-06-06 | End: 2025-07-04

## 2025-06-06 NOTE — PROGRESS NOTES
Orthopaedic Hand Clinic Note    Name: Monique Jose  YOB: 1940  Gender: female  MRN: [id number]      CC: Patient referred for evaluation of right hand pain.    HPI: Monique Jose is a 84 y.o. female left hand dominant with a chief complaint of right hand pain.  She reports the most painful joint is the right middle PIP joint.  She also has a right ring finger that catches occasionally.  She also has pain at the right index MCP joint as well as the right thumb CMC joint.  She is on Eliquis.  She is not able to take anti-inflammatories.    ROS/Meds/PSH/PMH/FH/SH: I personally reviewed the patients standard intake form.  Pertinents are discussed in the HPI    Physical Examination:  General: Awake and alert.  HEENT: Normocephalic, atraumatic  CV/Pulm: Breathing even and unlabored  Skin: No obvious rashes noted.  Lymphatic: No obvious evidence of lymphedema or lymphadenopathy    Musculoskeletal Exam:  Examination on the right upper extremity demonstrates cap refill < 5 seconds in all fingers  Patient has swelling to the right middle finger, mostly at the PIP joint and right index finger of the MCP joint.  She is unable to make a full composite fist.  She has no pain at the A1 pulley of the right ring finger.  I do not feel any palpable clicking.  She is tender palpation of the right middle finger PIP joint and the right thumb CMC joint.  She denies paresthesia.    Imaging / Electrodiagnostic Tests:     X-rays include a 3 view right hand.  Patient has severe degenerative changes at the right thumb CMC, right middle PIP, right index MCP    Assessment:   1. Pain of right hand    2. 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 Primary osteoarthritis of first carpometacarpal joint of right hand    4. Degenerative arthritis of metacarpophalangeal joint of index finger of right hand        Plan:   We discussed the diagnosis and different treatment options.

## 2025-06-13 ENCOUNTER — HOSPITAL ENCOUNTER (OUTPATIENT)
Age: 85
Discharge: HOME | End: 2025-06-15
Attending: ANESTHESIOLOGY
Payer: MEDICARE

## 2025-06-13 DIAGNOSIS — M48.062 LUMBAR STENOSIS WITH NEUROGENIC CLAUDICATION: ICD-10-CM

## 2025-06-13 PROCEDURE — 72148 MRI LUMBAR SPINE W/O DYE: CPT

## 2025-06-30 ENCOUNTER — OFFICE VISIT (OUTPATIENT)
Age: 85
End: 2025-06-30
Payer: MEDICARE

## 2025-06-30 ENCOUNTER — TELEPHONE (OUTPATIENT)
Age: 85
End: 2025-06-30

## 2025-06-30 DIAGNOSIS — M54.51 VERTEBROGENIC LOW BACK PAIN: Primary | ICD-10-CM

## 2025-06-30 DIAGNOSIS — M48.062 LUMBAR STENOSIS WITH NEUROGENIC CLAUDICATION: ICD-10-CM

## 2025-06-30 PROCEDURE — G8400 PT W/DXA NO RESULTS DOC: HCPCS | Performed by: ANESTHESIOLOGY

## 2025-06-30 PROCEDURE — 99214 OFFICE O/P EST MOD 30 MIN: CPT | Performed by: ANESTHESIOLOGY

## 2025-06-30 PROCEDURE — G8427 DOCREV CUR MEDS BY ELIG CLIN: HCPCS | Performed by: ANESTHESIOLOGY

## 2025-06-30 PROCEDURE — 1123F ACP DISCUSS/DSCN MKR DOCD: CPT | Performed by: ANESTHESIOLOGY

## 2025-06-30 PROCEDURE — G8421 BMI NOT CALCULATED: HCPCS | Performed by: ANESTHESIOLOGY

## 2025-06-30 PROCEDURE — 1090F PRES/ABSN URINE INCON ASSESS: CPT | Performed by: ANESTHESIOLOGY

## 2025-06-30 PROCEDURE — 1036F TOBACCO NON-USER: CPT | Performed by: ANESTHESIOLOGY

## 2025-06-30 PROCEDURE — 1159F MED LIST DOCD IN RCRD: CPT | Performed by: ANESTHESIOLOGY

## 2025-06-30 NOTE — TELEPHONE ENCOUNTER
Faxed to   Encounter Providers Encounter Date   Joss Burden MD (Attending)  NPI: [id number]  [phone] (Work)  [phone] (Fax)  [address]  [facility]

## 2025-06-30 NOTE — PROGRESS NOTES
Chronic Pain Consult Note      Plan:     A comprehensive pain management plan may consist of the following: Testing, Therapy, Medications, Interventions, Consults, and Follow up.    Detailed Plan for Today's Visit (Testing, Therapy, Medications, Interventions, Consults, and Follow up):  Vertebrogenic low back pain  Schedule L1-L3 Intracept  Will likely require L4-S1 sacrum will determine need after completion of treatment  Chronic right hand pain, MCP and PIP  Patient presents with arthritic joints of the right hand.  She is noting decreased ability to perform activities as closure of her hand is becoming more difficult as well as painful.  She takes OTC medications as received General Therapy without benefit.  Referral to POA hand team  Lumbar central canal stenosis  Schedule bilateral L3 4T CECELIA  Reviewed lumbar MRI without  Status post interlaminar L4-5 epidural steroid injection  Status post M ILD L3-4  Status post lumbar L3-4 arthrodesis with bone grafting  Not a surgical candidate per recommendation Dr. Miller  Continue gabapentin 300 mg 2 times daily  Status post physical therapy with [facility]  Lumbar spondylosis without myelopathy  Status post b/l L4-SA MBB  Chronic low back pain  Encourage continuation of active other lifestyle    General Recommendations: The pain condition that the patient suffers from is best treated with a multidisciplinary approach that involves an increase in physical activity to prevent de-conditioning and worsening of the pain cycle, as well as psychological counseling (formal and/or informal) to address the co morbid psychological effects of pain. Treatment will often involve judicious use of pain medications and interventional procedures to decrease the pain, allowing the patient to participate in the physical activity that will ultimately produce long-lasting pain reductions. The goal of the multidisciplinary approach is to return the patient to a higher level of overall function and

## 2025-07-02 NOTE — PROGRESS NOTES
Location: [facility]       Procedure: LUMBAR TF 3/4 4/5 BILAT       Time Out performed prior to start of the procedure:       Cruz Chacon MD performed the following reviews on Monique Jose 1940 prior to the start of the procedure:       patient was identified by name and     agreement on procedure being performed was verified   risks and benefits explained to patient by the provider  procedure site verified as Bilateral  patient was positioned for comfort   consent signed and verified for procedure             Procedure performed by:   Cruz Chacon MD      Patient assisted by:   ROMIE CORTES

## 2025-07-03 NOTE — TELEPHONE ENCOUNTER
Spoke with patient. She shows understanding that last dose of Eliquis will be on July 5th and Aug 4th for her upcoming procedures.

## 2025-07-09 ENCOUNTER — OFFICE VISIT (OUTPATIENT)
Age: 85
End: 2025-07-09

## 2025-07-09 DIAGNOSIS — M48.062 LUMBAR STENOSIS WITH NEUROGENIC CLAUDICATION: Primary | ICD-10-CM

## 2025-07-09 RX ORDER — DEXAMETHASONE SODIUM PHOSPHATE 10 MG/ML
10 INJECTION, SOLUTION INTRA-ARTICULAR; INTRALESIONAL; INTRAMUSCULAR; INTRAVENOUS; SOFT TISSUE ONCE
Status: COMPLETED | OUTPATIENT
Start: 2025-07-09 | End: 2025-07-09

## 2025-07-09 NOTE — PROGRESS NOTES
NAME: Monique Jose  ID:[id number]   :1940    Location: 390    Procedure: bilateral L3/4 Transforaminal Epidural Steroid Injection Under Fluoroscopic Imaging    Pre-op Diagnosis: 1. Lumbar Spinal Stenosis Neurogenic Claudication. 2. Lumbar Radicular Pain    Post-op Diagnosis: Same    Anesthesia: Local only     Complications: None    After confirming written and informed consent and discussing the risk, benefits and alternatives for the procedure, the patient had the correct site marked by the physician performing the procedure. The specific risks of bleeding and infection were discussed. The patient was taken to the fluoroscopy suite. A pulse oximeter was placed, and verbal and visual monitoring were maintained throughout the procedure.    The patient was then placed in the prone position. The skin overlying the lumbo-sacral spine was then prepped with chlorhexidine gluconate and a sterile drape was placed. Appropriate sterile attire was worn, including sterile gloves.. A time out was then performed involving the physician, radiation technologist and the patient.    Next, the anatomy of the lumbar spine was then identified using fluoroscopic guidance.    The left and right L3 pedicle was identified using emmanuel-posterior fluoroscopy. An oblique view was then obtained of the pedicle. The skin overlying the expected trajectory of the block needle was then anesthetized with 3 ml of 1% lidocaine. Next, a 22 G 5 inch Quincke needle was then advanced using a coaxial technique inferior to the 6 o'clock position on the cephalad pedicle, lateral to the approximate midpoint of the lamina. A lateral view was obtained, and intermittent fluoroscopy was utilized to advance the needle into the foramen.    Next, 1 ml of Omnipaque-240 was injected each side using real-time fluoroscopy to ensure non-vascular placement of the needle, and to confirm epidural spread of the contrast. Next, a 5ml solution containing

## 2025-07-23 NOTE — PROGRESS NOTES
Patient verified name and .  Order for consent  was not found in EHR  patient verifies procedure.   Type 1a surgery, phone assessment complete.  Orders not received.  Labs per surgeon: none  Labs per anesthesia protocol: none    Patient answered medical/surgical history questions at their best of ability. All prior to admission medications documented in EPIC.    Patient instructed to continue taking all prescription medications up to the day of surgery but to take only the following medications the day of surgery according to anesthesia guidelines with a small sip of water: Amiodarone Also, patient is requested to take 2 Tylenol in the morning and then again before bed on the day before surgery. Regular or extra strength may be used.       Patient informed that all vitamins and supplements should be held 7 days prior to surgery and NSAIDS 5 days prior to surgery. Prescription meds to hold:Eliquis per Dr Miner office. Pt states she was told by Dr Miner office to start holding today.     Patient instructed on the following:    > Arrive at OP Entrance, time of arrival to be called the day before by 1700  > No food after midnight, patient may drink clear liquids up until 2 hours prior to arrival. No gum, candy, mints.   > Responsible adult must drive patient to the hospital, stay during surgery, and patient will need supervision 24 hours after anesthesia  > Use non moisturizing soap in shower the night before surgery and on the morning of surgery  > All piercings must be removed prior to arrival.    > Leave all valuables (money and jewelry) at home but bring insurance card and ID on DOS.   > You may be required to pay a deductible or co-pay on the day of your procedure. You can pre-pay by calling [phone] if your surgery is at the [facility] or [phone] if your surgery is at the [facility].  > Do not wear make-up, nail polish, lotions, cologne, perfumes, powders, or oil on skin. Artificial nails are not

## 2025-07-24 ENCOUNTER — ANESTHESIA EVENT (OUTPATIENT)
Dept: SURGERY | Age: 85
End: 2025-07-24
Payer: MEDICARE

## 2025-07-24 DIAGNOSIS — M54.51 VERTEBROGENIC LOW BACK PAIN: Primary | ICD-10-CM

## 2025-07-24 NOTE — PERIOP NOTE
Preop department called to notify patient of arrival time for scheduled procedure. Instructions given to   - Arrive at OPC Entrance [address]  - Nothing to eat after midnight unless otherwise indicated. No gum, mints, or ice chips. You may have clear liquids two hours prior to arrival to the hospital.   - Have a responsible adult to drive patient to the hospital, stay during surgery, and patient will need supervision 24 hours after anesthesia.   - Use antibacterial soap in shower the night before surgery and on the morning of surgery.       Was patient contacted: Yes  Voicemail left: NA  Numbers contacted: [phone]   Arrival time: 0645  Time to stop clear liquids: 0448

## 2025-07-25 ENCOUNTER — HOSPITAL ENCOUNTER (OUTPATIENT)
Age: 85
Setting detail: OUTPATIENT SURGERY
Discharge: HOME | End: 2025-07-25
Attending: ANESTHESIOLOGY | Admitting: ANESTHESIOLOGY
Payer: MEDICARE

## 2025-07-25 ENCOUNTER — APPOINTMENT (OUTPATIENT)
Dept: GENERAL RADIOLOGY | Age: 85
End: 2025-07-25
Attending: ANESTHESIOLOGY
Payer: MEDICARE

## 2025-07-25 ENCOUNTER — ANESTHESIA (OUTPATIENT)
Dept: SURGERY | Age: 85
End: 2025-07-25
Payer: MEDICARE

## 2025-07-25 VITALS
OXYGEN SATURATION: 99 % | DIASTOLIC BLOOD PRESSURE: 71 MMHG | WEIGHT: 177 LBS | TEMPERATURE: 98 F | SYSTOLIC BLOOD PRESSURE: 164 MMHG | RESPIRATION RATE: 18 BRPM | BODY MASS INDEX: 31.36 KG/M2 | HEART RATE: 60 BPM | HEIGHT: 63 IN

## 2025-07-25 LAB — POTASSIUM BLD-SCNC: 3.7 MMOL/L (ref 3.5–5.1)

## 2025-07-25 PROCEDURE — 7100000000 HC PACU RECOVERY - FIRST 15 MIN: Performed by: ANESTHESIOLOGY

## 2025-07-25 PROCEDURE — 64628 TRML DSTRJ IOS BVN 1ST 2 L/S: CPT | Performed by: ANESTHESIOLOGY

## 2025-07-25 PROCEDURE — 2500000003 HC RX 250 WO HCPCS: Performed by: ANESTHESIOLOGY

## 2025-07-25 PROCEDURE — 3600000012 HC SURGERY LEVEL 2 ADDTL 15MIN: Performed by: ANESTHESIOLOGY

## 2025-07-25 PROCEDURE — 6360000002 HC RX W HCPCS: Performed by: ANESTHESIOLOGY

## 2025-07-25 PROCEDURE — 84132 ASSAY OF SERUM POTASSIUM: CPT

## 2025-07-25 PROCEDURE — 3700000001 HC ADD 15 MINUTES (ANESTHESIA): Performed by: ANESTHESIOLOGY

## 2025-07-25 PROCEDURE — 6370000000 HC RX 637 (ALT 250 FOR IP): Performed by: ANESTHESIOLOGY

## 2025-07-25 PROCEDURE — 6360000002 HC RX W HCPCS: Performed by: NURSE ANESTHETIST, CERTIFIED REGISTERED

## 2025-07-25 PROCEDURE — 2580000003 HC RX 258: Performed by: NURSE ANESTHETIST, CERTIFIED REGISTERED

## 2025-07-25 PROCEDURE — 3700000000 HC ANESTHESIA ATTENDED CARE: Performed by: ANESTHESIOLOGY

## 2025-07-25 PROCEDURE — 7100000010 HC PHASE II RECOVERY - FIRST 15 MIN: Performed by: ANESTHESIOLOGY

## 2025-07-25 PROCEDURE — 7100000001 HC PACU RECOVERY - ADDTL 15 MIN: Performed by: ANESTHESIOLOGY

## 2025-07-25 PROCEDURE — 64629 TRML DSTRJ IOS BVN EA ADDL: CPT | Performed by: ANESTHESIOLOGY

## 2025-07-25 PROCEDURE — 3600000002 HC SURGERY LEVEL 2 BASE: Performed by: ANESTHESIOLOGY

## 2025-07-25 PROCEDURE — C1889 IMPLANT/INSERT DEVICE, NOC: HCPCS | Performed by: ANESTHESIOLOGY

## 2025-07-25 PROCEDURE — 7100000011 HC PHASE II RECOVERY - ADDTL 15 MIN: Performed by: ANESTHESIOLOGY

## 2025-07-25 PROCEDURE — 2709999900 HC NON-CHARGEABLE SUPPLY: Performed by: ANESTHESIOLOGY

## 2025-07-25 PROCEDURE — 2720000010 HC SURG SUPPLY STERILE: Performed by: ANESTHESIOLOGY

## 2025-07-25 RX ORDER — DEXTROSE MONOHYDRATE 100 MG/ML
INJECTION, SOLUTION INTRAVENOUS CONTINUOUS PRN
Status: DISCONTINUED | OUTPATIENT
Start: 2025-07-25 | End: 2025-07-25 | Stop reason: HOSPADM

## 2025-07-25 RX ORDER — PROCHLORPERAZINE EDISYLATE 5 MG/ML
5 INJECTION INTRAMUSCULAR; INTRAVENOUS
Status: DISCONTINUED | OUTPATIENT
Start: 2025-07-25 | End: 2025-07-25 | Stop reason: HOSPADM

## 2025-07-25 RX ORDER — SODIUM CHLORIDE, SODIUM LACTATE, POTASSIUM CHLORIDE, CALCIUM CHLORIDE 600; 310; 30; 20 MG/100ML; MG/100ML; MG/100ML; MG/100ML
INJECTION, SOLUTION INTRAVENOUS CONTINUOUS
Status: DISCONTINUED | OUTPATIENT
Start: 2025-07-25 | End: 2025-07-25 | Stop reason: HOSPADM

## 2025-07-25 RX ORDER — SODIUM CHLORIDE 0.9 % (FLUSH) 0.9 %
5-40 SYRINGE (ML) INJECTION PRN
Status: DISCONTINUED | OUTPATIENT
Start: 2025-07-25 | End: 2025-07-25 | Stop reason: HOSPADM

## 2025-07-25 RX ORDER — DIPHENHYDRAMINE HYDROCHLORIDE 50 MG/ML
12.5 INJECTION, SOLUTION INTRAMUSCULAR; INTRAVENOUS
Status: DISCONTINUED | OUTPATIENT
Start: 2025-07-25 | End: 2025-07-25 | Stop reason: HOSPADM

## 2025-07-25 RX ORDER — SODIUM CHLORIDE 9 MG/ML
INJECTION, SOLUTION INTRAVENOUS PRN
Status: DISCONTINUED | OUTPATIENT
Start: 2025-07-25 | End: 2025-07-25 | Stop reason: HOSPADM

## 2025-07-25 RX ORDER — OXYCODONE HYDROCHLORIDE 5 MG/1
5 TABLET ORAL
Refills: 0 | Status: COMPLETED | OUTPATIENT
Start: 2025-07-25 | End: 2025-07-25

## 2025-07-25 RX ORDER — SODIUM CHLORIDE, SODIUM LACTATE, POTASSIUM CHLORIDE, CALCIUM CHLORIDE 600; 310; 30; 20 MG/100ML; MG/100ML; MG/100ML; MG/100ML
INJECTION, SOLUTION INTRAVENOUS
Status: DISCONTINUED | OUTPATIENT
Start: 2025-07-25 | End: 2025-07-25 | Stop reason: SDUPTHER

## 2025-07-25 RX ORDER — SODIUM CHLORIDE 0.9 % (FLUSH) 0.9 %
5-40 SYRINGE (ML) INJECTION EVERY 12 HOURS SCHEDULED
Status: DISCONTINUED | OUTPATIENT
Start: 2025-07-25 | End: 2025-07-25 | Stop reason: HOSPADM

## 2025-07-25 RX ORDER — IBUPROFEN 600 MG/1
1 TABLET ORAL PRN
Status: DISCONTINUED | OUTPATIENT
Start: 2025-07-25 | End: 2025-07-25 | Stop reason: HOSPADM

## 2025-07-25 RX ORDER — LIDOCAINE HYDROCHLORIDE AND EPINEPHRINE BITARTRATE 20; .01 MG/ML; MG/ML
INJECTION, SOLUTION SUBCUTANEOUS PRN
Status: DISCONTINUED | OUTPATIENT
Start: 2025-07-25 | End: 2025-07-25 | Stop reason: ALTCHOICE

## 2025-07-25 RX ORDER — PROPOFOL 10 MG/ML
INJECTION, EMULSION INTRAVENOUS
Status: DISCONTINUED | OUTPATIENT
Start: 2025-07-25 | End: 2025-07-25 | Stop reason: SDUPTHER

## 2025-07-25 RX ORDER — FENTANYL CITRATE 50 UG/ML
25 INJECTION, SOLUTION INTRAMUSCULAR; INTRAVENOUS EVERY 5 MIN PRN
Refills: 0 | Status: DISCONTINUED | OUTPATIENT
Start: 2025-07-25 | End: 2025-07-25 | Stop reason: HOSPADM

## 2025-07-25 RX ORDER — TRAMADOL HYDROCHLORIDE 50 MG/1
50 TABLET ORAL 2 TIMES DAILY
Qty: 14 TABLET | Refills: 0 | Status: SHIPPED | OUTPATIENT
Start: 2025-07-25 | End: 2025-08-01

## 2025-07-25 RX ORDER — LIDOCAINE HYDROCHLORIDE 20 MG/ML
INJECTION, SOLUTION EPIDURAL; INFILTRATION; INTRACAUDAL; PERINEURAL
Status: DISCONTINUED | OUTPATIENT
Start: 2025-07-25 | End: 2025-07-25 | Stop reason: SDUPTHER

## 2025-07-25 RX ORDER — ONDANSETRON 2 MG/ML
4 INJECTION INTRAMUSCULAR; INTRAVENOUS
Status: COMPLETED | OUTPATIENT
Start: 2025-07-25 | End: 2025-07-25

## 2025-07-25 RX ORDER — MEPERIDINE HYDROCHLORIDE 25 MG/ML
12.5 INJECTION INTRAMUSCULAR; INTRAVENOUS; SUBCUTANEOUS EVERY 5 MIN PRN
Refills: 0 | Status: DISCONTINUED | OUTPATIENT
Start: 2025-07-25 | End: 2025-07-25 | Stop reason: HOSPADM

## 2025-07-25 RX ADMIN — HYDROMORPHONE HYDROCHLORIDE 0.5 MG: 1 INJECTION, SOLUTION INTRAMUSCULAR; INTRAVENOUS; SUBCUTANEOUS at 10:13

## 2025-07-25 RX ADMIN — PROPOFOL 20 MG: 10 INJECTION, EMULSION INTRAVENOUS at 09:04

## 2025-07-25 RX ADMIN — OXYCODONE 5 MG: 5 TABLET ORAL at 10:17

## 2025-07-25 RX ADMIN — PROPOFOL 60 MG: 10 INJECTION, EMULSION INTRAVENOUS at 08:47

## 2025-07-25 RX ADMIN — MEPERIDINE HYDROCHLORIDE 12.5 MG: 25 INJECTION INTRAMUSCULAR; INTRAVENOUS; SUBCUTANEOUS at 10:34

## 2025-07-25 RX ADMIN — HYDROMORPHONE HYDROCHLORIDE 0.5 MG: 1 INJECTION, SOLUTION INTRAMUSCULAR; INTRAVENOUS; SUBCUTANEOUS at 10:08

## 2025-07-25 RX ADMIN — LIDOCAINE HYDROCHLORIDE 50 MG: 20 INJECTION, SOLUTION EPIDURAL; INFILTRATION; INTRACAUDAL; PERINEURAL at 08:47

## 2025-07-25 RX ADMIN — SODIUM CHLORIDE, SODIUM LACTATE, POTASSIUM CHLORIDE, AND CALCIUM CHLORIDE: 600; 310; 30; 20 INJECTION, SOLUTION INTRAVENOUS at 08:38

## 2025-07-25 RX ADMIN — CEFAZOLIN SODIUM 2000 MG: 1 INJECTION, POWDER, FOR SOLUTION INTRAMUSCULAR; INTRAVENOUS at 08:52

## 2025-07-25 RX ADMIN — PROPOFOL 125 MCG/KG/MIN: 10 INJECTION, EMULSION INTRAVENOUS at 08:48

## 2025-07-25 RX ADMIN — ONDANSETRON 4 MG: 2 INJECTION, SOLUTION INTRAMUSCULAR; INTRAVENOUS at 10:11

## 2025-07-25 ASSESSMENT — PAIN - FUNCTIONAL ASSESSMENT: PAIN_FUNCTIONAL_ASSESSMENT: 0-10

## 2025-07-25 ASSESSMENT — PAIN SCALES - GENERAL
PAINLEVEL_OUTOF10: 6
PAINLEVEL_OUTOF10: 5
PAINLEVEL_OUTOF10: 7
PAINLEVEL_OUTOF10: 9
PAINLEVEL_OUTOF10: 0

## 2025-07-25 NOTE — OP NOTE
Operative Note      Patient: Monique Jose  YOB: 1940  MRN: [id number]    Date of Procedure: 7/25/2025    Pre-Op Diagnosis Codes:      * Vertebrogenic low back pain [M54.51]    Post-Op Diagnosis: Same       Procedure(s):  INTRACEPT AT L1-3    Surgeon(s):  Cruz Chacon MD    Assistant:   * No surgical staff found *    Anesthesia: Monitor Anesthesia Care    Estimated Blood Loss (mL): Minimal    Complications: None    Specimens:   * No specimens in log *    Implants:  * No implants in log *      Drains: * No LDAs found *    Findings:  Present At Time Of Surgery (PATOS) (choose all levels that have infection present):  No infection present  Other Findings: none    Detailed Description of Procedure:   Intracept Procedure Note  Treated levels include: L1-3  After discussing risk, benefits and alternatives with the patient in detail, informed consent was obtained with the patient verbalizing understanding. The correct operative site was then marked by the physician. The specific risks of bleeding and infection were discussed, as was the risk of seroma.    The patient was brought to the operating room. Anesthesia care was initiated by the anesthesia team. Prophylactic antibiotics were administered, specifically Ancef 2 g IV, prior to incision. The patient was placed in the prone position. Ample padding was provided for pressure points. The patient was prepped across the thoraco-lumbar spine and associated paraspinous region with Duraprep. The patient was then draped. Blue towels were used to frame the operative field. A sterile laparotomy drape was then placed, with the drape opening being expanded to expose the entire operative field. loban was then placed over the operative field. The fluoroscopy arm was then sterilely covered. A sterile drape was then clamped to the field drapes to provide a sterile cover for the lower aspect of the fluoroscopy arm.    A formal time-out was performed involving the

## 2025-07-25 NOTE — ANESTHESIA PRE PROCEDURE
Department of Anesthesiology  Preprocedure Note       Name:  Monique Jose   Age:  84 y.o.  :  1940                                          MRN:  [id number]         Date:  2025      Surgeon: Surgeon(s):  Cruz Chacon MD    Procedure: Procedure(s):  INTRACEPT AT L1-3    Medications prior to admission:   Prior to Admission medications    Medication Sig Start Date End Date Taking? Authorizing Provider   acetaminophen (TYLENOL) 500 MG tablet Take 1 tablet by mouth every 6 hours as needed   Yes Automatic Reconciliation, Ar   amiodarone (CORDARONE) 200 MG tablet Take 1 tablet by mouth daily   Yes Automatic Reconciliation, Ar   apixaban (ELIQUIS) 5 MG TABS tablet Take 1 tablet by mouth in the morning and 1 tablet in the evening. 18  Yes Automatic Reconciliation, Ar   benazepril (LOTENSIN) 20 MG tablet Take 1 tablet by mouth daily   Yes Automatic Reconciliation, Ar   celecoxib (CELEBREX) 200 MG capsule Take 1 capsule by mouth 2 times daily   Yes Automatic Reconciliation, Ar   fenofibrate (TRIGLIDE) 160 MG tablet Take 1 tablet by mouth   Yes Automatic Reconciliation, Ar   hydroCHLOROthiazide (HYDRODIURIL) 25 MG tablet Take 1 tablet by mouth daily   Yes Automatic Reconciliation, Ar   methocarbamol (ROBAXIN) 750 MG tablet Take 1 tablet by mouth 4 times daily as needed   Yes Automatic Reconciliation, Ar   oxyCODONE 5 MG capsule Take 1 capsule by mouth every 4 hours as needed.   Yes Automatic Reconciliation, Ar   rosuvastatin (CRESTOR) 10 MG tablet Take 1 tablet by mouth   Yes Automatic Reconciliation, Ar   senna (SENOKOT) 8.6 MG tablet Take 2 tablets by mouth daily   Yes Automatic Reconciliation, Ar   traMADol (ULTRAM) 50 MG tablet Take 1 tablet by mouth every 6 hours as needed. 18  Yes Automatic Reconciliation, Ar   gabapentin (NEURONTIN) 300 MG capsule Take 1 capsule by mouth 2 times daily for 90 days.  Patient not taking: Reported on 2025  Cruz Chacon MD   [employer]

## 2025-07-25 NOTE — H&P
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  Discussed proceeding with Intracept as well as bilateral transforaminal CECELIA's.    Location of greatest pain? Lumbar, right thigh  Other areas of Pain?   Onset:years   Inciting event:     Course: gradually  Pain Description: aching  Worst during:  changes  How often does the pain occur?  Changes in severity but is always there  Pain score:  -Right now: 7  -At Its best: 3  -At its worst: 8    How does the pain make you feel? Frustrated    What does the pain interfere with?  Daily Activities    What makes pain better?  Rest    What makes pain worst?  Walking    What other symptoms do you have? Balance Issues and Stiffness  Loss of bowel/bladder control? No    HPI          Objective:      PHYSICAL EXAM:  Constitutional: No acute distress.  Eyes: Pupils equal round and reactive to light, EOMI, no icterus.   Mouth: Moist.  CV: No significant edema noted, no significant JVD, RRR   Lungs: Speaks in full sentences. Nonlabored breathing, CTAB   MSK: Moves all extremities well.  Facet loading positive lumbar spine.  Mild pain with direct compression of lumbar spinous processes.  Right hand second MCP tender to palpation.  Arthritic changes within the right hand.  Neuro: Alert and oriented x3. Cranial nerves II through XII are grossly intact.   Psych: Good insight and judgement, appropriate mood and affect    Physical Exam     Ortho Exam     There were no vitals filed for this visit.  Wt Readings from Last 3 Encounters:   No data found for Wt              Previous interventions:  Procedure Date Results   Lumbar CECELIA 3 4 interlaminar 9/12/2022 1 week complete resolution followed by return to baseline   M ILD L3-4 11/14/2022 Less vigorous and improvement   Bilateral L4 through SA MBB 1/26/2023 No significant improvement   Arthrodesis L3-4 8/21/2023 Gradually improving   Schedule bilateral L34T CECELIA       Schedule Intracept L1-3                  Previous medication trials:  Listed:  the entirety of this document were transcribed utilizing voice recognition software. Transcription errors may be present.    Cruz Chacon M.D.

## 2025-07-25 NOTE — DISCHARGE INSTRUCTIONS
Postoperative  Instructions:      Immediate postop instructions:      -No driving or operating machinery until the day after due to IV sedation.  -Avoid alcohol for 24 hours postsurgery.  -Rest on the procedural day.  Resume normal activities the next day as tolerated.  -IV steroids may cause mild insomnia-nothing to worry about.  -Keep dressings dry.  Keep dressings on for at least 2 days before removal.  -Avoid hot tubs Jacuzzis or submersion of wound for at least 10 days postoperatively.  -Resume normal diet as tolerated.  -Expect some discomfort healing may take 4 to 6 weeks.  With possible back pain.  -Notify office immediately if new symptoms especially in your legs appear.  -Apply ice to your back for 15 to 20 minutes, 3-4 times daily for the few first days.  Follow any heat application with ice.  -Follow medication instructions for pain as discussed with provider or clinic.  -Resume routine medications unless instructed otherwise.  -Delay long travels for 3 to 4 weeks postoperatively.  -Discussed activity resumption with your doctor.      Follow up appointment will be made 10-14  days  after  your  Surgery with the physician.    Cruz Chacon MD

## 2025-07-25 NOTE — ANESTHESIA POSTPROCEDURE EVALUATION
Department of Anesthesiology  Postprocedure Note    Patient: Monique Jose  MRN: [id number]  YOB: 1940  Date of evaluation: 7/25/2025    Procedure Summary       Date: 07/25/25 Room / Location: [facility] OP OR 05 / [facility]    Anesthesia Start: 0838 Anesthesia Stop: 0942    Procedure: INTRACEPT AT L1-3 (Spine Lumbar) Diagnosis:       Vertebrogenic low back pain      (Vertebrogenic low back pain [M54.51])    Surgeons: Cruz Chacon MD Responsible Provider: Gio Freire MD    Anesthesia Type: TIVA ASA Status: 3            Anesthesia Type: No value filed.    Ciara Phase I: Ciara Score: 8    Ciara Phase II:      Anesthesia Post Evaluation    Patient location during evaluation: PACU  Patient participation: complete - patient participated  Level of consciousness: awake and alert  Pain scale: pain adequately controlled.  Airway patency: patent  Nausea & Vomiting: no nausea and no vomiting  Cardiovascular status: hemodynamically stable  Respiratory status: acceptable  Hydration status: euvolemic  Multimodal analgesia pain management approach  Pain management: adequate    No notable events documented.

## 2025-08-07 ENCOUNTER — OFFICE VISIT (OUTPATIENT)
Age: 85
End: 2025-08-07

## 2025-08-07 DIAGNOSIS — M54.51 VERTEBROGENIC LOW BACK PAIN: Primary | ICD-10-CM

## 2025-08-07 PROCEDURE — 99024 POSTOP FOLLOW-UP VISIT: CPT | Performed by: ANESTHESIOLOGY

## 2025-08-07 RX ORDER — TRAMADOL HYDROCHLORIDE 50 MG/1
50 TABLET ORAL 3 TIMES DAILY
Qty: 42 TABLET | Refills: 0 | Status: SHIPPED | OUTPATIENT
Start: 2025-08-07 | End: 2025-08-21

## 2025-08-07 RX ORDER — METHYLPREDNISOLONE 4 MG/1
TABLET ORAL
Qty: 21 KIT | Refills: 0 | Status: SHIPPED | OUTPATIENT
Start: 2025-08-07

## (undated) DEVICE — SOLUTION IRRIG 1000ML H2O PIC PLAS SHATTERPROOF CONTAINER

## (undated) DEVICE — BIPOLAR SEALER 23-112-1 AQM 6.0: Brand: AQUAMANTYS ®

## (undated) DEVICE — SUTURE VCRL SZ 1 L27IN ABSRB UD L36MM CP-1 1/2 CIR REV CUT J268H

## (undated) DEVICE — Device

## (undated) DEVICE — MCLASS OSCILLATING SAW BLADE 19 X 1.27 (0.050") X 90 MM: Brand: MCLASS

## (undated) DEVICE — SOLUTION IV 500ML 0.9% SOD CHL FLX CONT

## (undated) DEVICE — SOLUTION IV 1000ML 0.9% SOD CHL

## (undated) DEVICE — SOLUTION IRRIG 3000ML 0.9% SOD CHL FLX CONT 0797208] ICU MEDICAL INC]

## (undated) DEVICE — 3000CC GUARDIAN II: Brand: GUARDIAN

## (undated) DEVICE — BANDAGE COMPR SELF ADH 5 YDX4 IN TAN STRL PREMIERPRO LF

## (undated) DEVICE — SKIN STAPLER: Brand: SIGNET

## (undated) DEVICE — 3M™ IOBAN™ 2 ANTIMICROBIAL INCISE DRAPE 6650EZ: Brand: IOBAN™ 2

## (undated) DEVICE — SUTURE STRATAFIX SPRL SZ 1 L14IN ABSRB VLT L48CM CTX 1/2 SXPD2B405

## (undated) DEVICE — MEDI-VAC NON-CONDUCTIVE SUCTION TUBING: Brand: CARDINAL HEALTH

## (undated) DEVICE — BUTTON SWITCH PENCIL BLADE ELECTRODE, HOLSTER: Brand: EDGE

## (undated) DEVICE — REM POLYHESIVE ADULT PATIENT RETURN ELECTRODE: Brand: VALLEYLAB

## (undated) DEVICE — T4 HOOD

## (undated) DEVICE — SYR 50ML LR LCK 1ML GRAD NSAF --

## (undated) DEVICE — TRAY PREP DRY W/ PREM GLV 2 APPL 6 SPNG 2 UNDPD 1 OVERWRAP

## (undated) DEVICE — SYR LR LCK 1ML GRAD NSAF 30ML --

## (undated) DEVICE — ABSORBENT, WATERPROOF, BACTERIA PROOF FILM DRESSING: Brand: OPSITE POST OP 9.5X8.5CM CTN 20

## (undated) DEVICE — SHEET,DRAPE,53X77,STERILE: Brand: MEDLINE

## (undated) DEVICE — DRAPE,TOP,102X53,STERILE: Brand: MEDLINE

## (undated) DEVICE — SOLUTION IV 250ML 0.9% SOD CHL FOR EXTRACELLULAR FLD REPL N L8002] B BRAUN MEDICAL INC]

## (undated) DEVICE — APPLICATOR MEDICATED 26 CC SOLUTION HI LT ORNG CHLORAPREP

## (undated) DEVICE — 2000CC GUARDIAN II: Brand: GUARDIAN

## (undated) DEVICE — Z DISCONTINUED USE 2744636  DRESSING AQUACEL 14 IN ALG W3.5XL14IN POLYUR FLM CVR W/ HYDRCOLL

## (undated) DEVICE — Device: Brand: POWER-FLO®

## (undated) DEVICE — GOWN,SIRUS,NONRNF,SETINSLV,2XL,18/CS: Brand: MEDLINE

## (undated) DEVICE — MEDI-VAC YANKAUER SUCTION HANDLE W/BULBOUS TIP: Brand: CARDINAL HEALTH

## (undated) DEVICE — BLADE SAW W12.5XL73.5MM THK0.8MM CUT THK1MM RECIP FOR L BNE

## (undated) DEVICE — NEEDLE HYPO 18GA L1.5IN PNK S STL HUB POLYPR SHLD REG BVL

## (undated) DEVICE — LIQUIBAND RAPID ADHESIVE 36/CS 0.8ML: Brand: MEDLINE

## (undated) DEVICE — (D)PREP SKN CHLRAPRP APPL 26ML -- CONVERT TO ITEM 371833

## (undated) DEVICE — DRAPE C ARM W/ POLY STRP W42XL72IN FOR MOB XR

## (undated) DEVICE — FAN SPRAY KIT: Brand: PULSAVAC®

## (undated) DEVICE — NEEDLE SPNL 22GA L3.5IN BLK HUB S STL REG WALL FIT STYL

## (undated) DEVICE — SHEET, T, LAPAROTOMY, STERILE: Brand: MEDLINE

## (undated) DEVICE — SUTURE VCRL SZ 2-0 L27IN ABSRB UD L36MM CP-1 1/2 CIR REV J266H

## (undated) DEVICE — TRAY CATH 16F DRN BG LTX -- CONVERT TO ITEM 363158

## (undated) DEVICE — BLADE SAW PAT RMR PILT H 46MM --

## (undated) DEVICE — PACK TKR SZ 5 FEM PREP TRL POST STBL INTUITION SOLO ATTUNE

## (undated) DEVICE — INTRACEPT RF PROBE: Brand: INTRACEPT RF PROBE

## (undated) DEVICE — CURETTE BNE CEM 10IN DISP --

## (undated) DEVICE — PACK PROCEDURE SURG TOT KNEE

## (undated) DEVICE — INTRACEPT ACCESS INSTRUMENTS 2 VB: Brand: INTRACEPT

## (undated) DEVICE — X-RAY SPONGES,12 PLY: Brand: DERMACEA

## (undated) DEVICE — DRAPE SHT 3 QTR PROXIMA 53X77 --

## (undated) DEVICE — DRAPE,U/SHT,SPLIT,FILM,60X84,STERILE: Brand: MEDLINE

## (undated) DEVICE — GOWN,REINF,POLY,ECL,PP SLV,XL: Brand: MEDLINE